# Patient Record
Sex: MALE | Race: WHITE | NOT HISPANIC OR LATINO | Employment: OTHER | URBAN - METROPOLITAN AREA
[De-identification: names, ages, dates, MRNs, and addresses within clinical notes are randomized per-mention and may not be internally consistent; named-entity substitution may affect disease eponyms.]

---

## 2017-01-02 DIAGNOSIS — G89.29 OTHER CHRONIC PAIN: ICD-10-CM

## 2017-01-02 DIAGNOSIS — G89.4 CHRONIC PAIN SYNDROME: Primary | ICD-10-CM

## 2017-01-02 RX ORDER — OXYCODONE HYDROCHLORIDE 10 MG/1
10 TABLET ORAL
Qty: 120 TABLET | Refills: 0 | Status: SHIPPED | OUTPATIENT
Start: 2017-01-02 | End: 2017-02-01 | Stop reason: SDUPTHER

## 2017-01-14 DIAGNOSIS — J01.90 SUBACUTE SINUSITIS, UNSPECIFIED LOCATION: Primary | ICD-10-CM

## 2017-01-14 RX ORDER — CEFDINIR 300 MG/1
300 CAPSULE ORAL 2 TIMES DAILY
Qty: 28 CAPSULE | Refills: 0 | Status: SHIPPED | OUTPATIENT
Start: 2017-01-14 | End: 2017-01-15

## 2017-01-15 DIAGNOSIS — J01.90 SUBACUTE SINUSITIS, UNSPECIFIED LOCATION: Primary | ICD-10-CM

## 2017-01-15 RX ORDER — AMOXICILLIN AND CLAVULANATE POTASSIUM 875; 125 MG/1; MG/1
1 TABLET, FILM COATED ORAL 2 TIMES DAILY
Qty: 28 TABLET | Refills: 0 | Status: SHIPPED | OUTPATIENT
Start: 2017-01-15 | End: 2018-01-16 | Stop reason: SDUPTHER

## 2017-02-01 DIAGNOSIS — G89.4 CHRONIC PAIN SYNDROME: ICD-10-CM

## 2017-02-01 DIAGNOSIS — G89.29 OTHER CHRONIC PAIN: ICD-10-CM

## 2017-02-01 RX ORDER — OXYCODONE HYDROCHLORIDE 10 MG/1
10 TABLET ORAL
Qty: 120 TABLET | Refills: 0 | Status: SHIPPED | OUTPATIENT
Start: 2017-02-01 | End: 2017-03-02 | Stop reason: SDUPTHER

## 2017-02-16 ENCOUNTER — LAB REQUISITION (OUTPATIENT)
Dept: LAB | Facility: HOSPITAL | Age: 67
End: 2017-02-16

## 2017-02-16 ENCOUNTER — OUTSIDE FACILITY SERVICE (OUTPATIENT)
Dept: GASTROENTEROLOGY | Facility: CLINIC | Age: 67
End: 2017-02-16

## 2017-02-16 DIAGNOSIS — K21.9 GASTRO-ESOPHAGEAL REFLUX DISEASE WITHOUT ESOPHAGITIS: ICD-10-CM

## 2017-02-16 PROCEDURE — 88305 TISSUE EXAM BY PATHOLOGIST: CPT | Performed by: INTERNAL MEDICINE

## 2017-02-16 PROCEDURE — 43239 EGD BIOPSY SINGLE/MULTIPLE: CPT | Performed by: INTERNAL MEDICINE

## 2017-02-17 LAB
CYTO UR: NORMAL
LAB AP CASE REPORT: NORMAL
LAB AP CLINICAL INFORMATION: NORMAL
Lab: NORMAL
PATH REPORT.FINAL DX SPEC: NORMAL
PATH REPORT.GROSS SPEC: NORMAL

## 2017-02-21 ENCOUNTER — TELEPHONE (OUTPATIENT)
Dept: GASTROENTEROLOGY | Facility: CLINIC | Age: 67
End: 2017-02-21

## 2017-02-21 NOTE — TELEPHONE ENCOUNTER
I called and discussed the results of the pathology. I recommend that he continue the current medication.

## 2017-02-22 DIAGNOSIS — E78.5 HYPERLIPIDEMIA, UNSPECIFIED HYPERLIPIDEMIA TYPE: ICD-10-CM

## 2017-02-22 RX ORDER — ATORVASTATIN CALCIUM 20 MG/1
20 TABLET, FILM COATED ORAL DAILY
Qty: 90 TABLET | Refills: 2 | Status: SHIPPED | OUTPATIENT
Start: 2017-02-22 | End: 2017-10-03 | Stop reason: SDUPTHER

## 2017-03-02 DIAGNOSIS — G89.4 CHRONIC PAIN SYNDROME: ICD-10-CM

## 2017-03-02 DIAGNOSIS — G89.29 OTHER CHRONIC PAIN: ICD-10-CM

## 2017-03-02 RX ORDER — OXYCODONE HYDROCHLORIDE 10 MG/1
10 TABLET ORAL
Qty: 120 TABLET | Refills: 0 | Status: SHIPPED | OUTPATIENT
Start: 2017-03-02 | End: 2017-04-03 | Stop reason: SDUPTHER

## 2017-04-03 DIAGNOSIS — G89.29 OTHER CHRONIC PAIN: ICD-10-CM

## 2017-04-03 DIAGNOSIS — G89.4 CHRONIC PAIN SYNDROME: ICD-10-CM

## 2017-04-04 RX ORDER — OXYCODONE HYDROCHLORIDE 10 MG/1
10 TABLET ORAL
Qty: 120 TABLET | Refills: 0
Start: 2017-04-04 | End: 2017-05-03 | Stop reason: SDUPTHER

## 2017-05-03 DIAGNOSIS — G89.4 CHRONIC PAIN SYNDROME: ICD-10-CM

## 2017-05-03 DIAGNOSIS — G89.29 OTHER CHRONIC PAIN: ICD-10-CM

## 2017-05-03 RX ORDER — OXYCODONE HYDROCHLORIDE 10 MG/1
10 TABLET ORAL
Qty: 120 TABLET | Refills: 0 | Status: SHIPPED | OUTPATIENT
Start: 2017-05-03 | End: 2017-05-03 | Stop reason: SDUPTHER

## 2017-05-03 RX ORDER — OXYCODONE HYDROCHLORIDE 10 MG/1
10 TABLET ORAL
Qty: 120 TABLET | Refills: 0 | Status: SHIPPED | OUTPATIENT
Start: 2017-05-03 | End: 2017-06-01 | Stop reason: SDUPTHER

## 2017-05-03 RX ORDER — OXYCODONE HYDROCHLORIDE 10 MG/1
10 TABLET ORAL
Qty: 120 TABLET | Refills: 0
Start: 2017-05-03 | End: 2017-05-03 | Stop reason: SDUPTHER

## 2017-05-19 RX ORDER — BUPROPION HYDROCHLORIDE 150 MG/1
TABLET ORAL
Qty: 30 TABLET | Refills: 0 | Status: SHIPPED | OUTPATIENT
Start: 2017-05-19 | End: 2017-06-22 | Stop reason: SDUPTHER

## 2017-06-01 DIAGNOSIS — G89.4 CHRONIC PAIN SYNDROME: ICD-10-CM

## 2017-06-01 DIAGNOSIS — G89.29 OTHER CHRONIC PAIN: ICD-10-CM

## 2017-06-01 RX ORDER — OXYCODONE HYDROCHLORIDE 10 MG/1
10 TABLET ORAL
Qty: 120 TABLET | Refills: 0 | Status: SHIPPED | OUTPATIENT
Start: 2017-06-01 | End: 2017-07-05 | Stop reason: SDUPTHER

## 2017-06-22 RX ORDER — BUPROPION HYDROCHLORIDE 150 MG/1
TABLET ORAL
Qty: 30 TABLET | Refills: 5 | Status: SHIPPED | OUTPATIENT
Start: 2017-06-22 | End: 2018-04-23

## 2017-07-05 DIAGNOSIS — G89.29 OTHER CHRONIC PAIN: ICD-10-CM

## 2017-07-05 DIAGNOSIS — G89.4 CHRONIC PAIN SYNDROME: ICD-10-CM

## 2017-07-05 RX ORDER — OXYCODONE HYDROCHLORIDE 10 MG/1
10 TABLET ORAL
Qty: 120 TABLET | Refills: 0 | Status: SHIPPED | OUTPATIENT
Start: 2017-07-05 | End: 2017-08-02 | Stop reason: SDUPTHER

## 2017-07-31 RX ORDER — SERTRALINE HYDROCHLORIDE 100 MG/1
TABLET, FILM COATED ORAL
Qty: 30 TABLET | Refills: 5 | Status: SHIPPED | OUTPATIENT
Start: 2017-07-31 | End: 2018-02-20 | Stop reason: SDUPTHER

## 2017-08-02 DIAGNOSIS — G89.4 CHRONIC PAIN SYNDROME: ICD-10-CM

## 2017-08-02 DIAGNOSIS — G89.29 OTHER CHRONIC PAIN: ICD-10-CM

## 2017-08-02 RX ORDER — OXYCODONE HYDROCHLORIDE 10 MG/1
10 TABLET ORAL
Qty: 120 TABLET | Refills: 0 | Status: SHIPPED | OUTPATIENT
Start: 2017-08-02 | End: 2017-09-02 | Stop reason: SDUPTHER

## 2017-08-17 DIAGNOSIS — E11.9 DIABETIC EYE EXAM (HCC): Primary | ICD-10-CM

## 2017-08-17 DIAGNOSIS — Z01.00 DIABETIC EYE EXAM (HCC): Primary | ICD-10-CM

## 2017-09-02 DIAGNOSIS — G89.29 CHRONIC NECK AND BACK PAIN: Primary | ICD-10-CM

## 2017-09-02 DIAGNOSIS — G89.4 CHRONIC PAIN SYNDROME: ICD-10-CM

## 2017-09-02 DIAGNOSIS — M54.9 CHRONIC NECK AND BACK PAIN: Primary | ICD-10-CM

## 2017-09-02 DIAGNOSIS — G89.29 OTHER CHRONIC PAIN: ICD-10-CM

## 2017-09-02 DIAGNOSIS — M54.2 CHRONIC NECK AND BACK PAIN: Primary | ICD-10-CM

## 2017-09-02 RX ORDER — OXYCODONE HYDROCHLORIDE 10 MG/1
10 TABLET ORAL
Qty: 120 TABLET | Refills: 0
Start: 2017-09-02 | End: 2017-10-03 | Stop reason: SDUPTHER

## 2017-10-03 DIAGNOSIS — E78.49 OTHER HYPERLIPIDEMIA: ICD-10-CM

## 2017-10-03 DIAGNOSIS — M54.9 CHRONIC NECK AND BACK PAIN: Primary | ICD-10-CM

## 2017-10-03 DIAGNOSIS — G89.4 CHRONIC PAIN SYNDROME: ICD-10-CM

## 2017-10-03 DIAGNOSIS — G89.29 CHRONIC NECK AND BACK PAIN: Primary | ICD-10-CM

## 2017-10-03 DIAGNOSIS — G89.29 OTHER CHRONIC PAIN: ICD-10-CM

## 2017-10-03 DIAGNOSIS — E78.5 HYPERLIPIDEMIA, UNSPECIFIED HYPERLIPIDEMIA TYPE: ICD-10-CM

## 2017-10-03 DIAGNOSIS — M54.2 CHRONIC NECK AND BACK PAIN: Primary | ICD-10-CM

## 2017-10-03 RX ORDER — OXYCODONE HYDROCHLORIDE 10 MG/1
10 TABLET ORAL
Qty: 120 TABLET | Refills: 0 | Status: SHIPPED | OUTPATIENT
Start: 2017-10-03 | End: 2017-11-02 | Stop reason: SDUPTHER

## 2017-10-03 RX ORDER — ATORVASTATIN CALCIUM 20 MG/1
20 TABLET, FILM COATED ORAL DAILY
Qty: 90 TABLET | Refills: 2 | Status: SHIPPED | OUTPATIENT
Start: 2017-10-03 | End: 2018-06-07 | Stop reason: SDUPTHER

## 2017-11-02 DIAGNOSIS — G89.4 CHRONIC PAIN SYNDROME: ICD-10-CM

## 2017-11-02 DIAGNOSIS — G89.29 OTHER CHRONIC PAIN: ICD-10-CM

## 2017-11-02 RX ORDER — OXYCODONE HYDROCHLORIDE 10 MG/1
10 TABLET ORAL
Qty: 120 TABLET | Refills: 0 | Status: SHIPPED | OUTPATIENT
Start: 2017-11-02 | End: 2017-12-04 | Stop reason: SDUPTHER

## 2017-12-04 DIAGNOSIS — G89.4 CHRONIC PAIN SYNDROME: ICD-10-CM

## 2017-12-04 DIAGNOSIS — G89.29 OTHER CHRONIC PAIN: ICD-10-CM

## 2017-12-04 RX ORDER — OXYCODONE HYDROCHLORIDE 10 MG/1
10 TABLET ORAL
Qty: 120 TABLET | Refills: 0 | Status: SHIPPED | OUTPATIENT
Start: 2017-12-04 | End: 2018-01-03 | Stop reason: SDUPTHER

## 2018-01-03 DIAGNOSIS — G89.4 CHRONIC PAIN SYNDROME: ICD-10-CM

## 2018-01-03 DIAGNOSIS — G89.29 OTHER CHRONIC PAIN: ICD-10-CM

## 2018-01-03 RX ORDER — OXYCODONE HYDROCHLORIDE 10 MG/1
10 TABLET ORAL
Qty: 120 TABLET | Refills: 0 | Status: SHIPPED | OUTPATIENT
Start: 2018-01-03 | End: 2018-02-02 | Stop reason: SDUPTHER

## 2018-01-04 ENCOUNTER — TELEPHONE (OUTPATIENT)
Dept: INTERNAL MEDICINE | Facility: CLINIC | Age: 68
End: 2018-01-04

## 2018-01-04 DIAGNOSIS — K21.9 GASTROESOPHAGEAL REFLUX DISEASE WITHOUT ESOPHAGITIS: ICD-10-CM

## 2018-01-04 RX ORDER — OMEPRAZOLE 20 MG/1
20 CAPSULE, DELAYED RELEASE ORAL 2 TIMES DAILY
Qty: 180 CAPSULE | Refills: 3 | Status: ON HOLD | OUTPATIENT
Start: 2018-01-04 | End: 2019-01-01

## 2018-01-04 RX ORDER — OMEPRAZOLE 20 MG/1
20 CAPSULE, DELAYED RELEASE ORAL DAILY
Qty: 180 CAPSULE | Refills: 3 | Status: SHIPPED | OUTPATIENT
Start: 2018-01-04 | End: 2018-01-04 | Stop reason: SDUPTHER

## 2018-01-04 NOTE — TELEPHONE ENCOUNTER
MEIJER HAS QUESTIONS ON THE DIRECTIONS OF PATIENT'S OMEPRAZOLE. LAST RX STATED 1 BID. [phone]

## 2018-01-16 DIAGNOSIS — J01.90 SUBACUTE SINUSITIS, UNSPECIFIED LOCATION: ICD-10-CM

## 2018-01-16 RX ORDER — AMOXICILLIN AND CLAVULANATE POTASSIUM 875; 125 MG/1; MG/1
1 TABLET, FILM COATED ORAL
Qty: 28 TABLET | Refills: 0 | Status: SHIPPED | OUTPATIENT
Start: 2018-01-16 | End: 2018-12-10

## 2018-02-02 DIAGNOSIS — G89.4 CHRONIC PAIN SYNDROME: ICD-10-CM

## 2018-02-02 DIAGNOSIS — G89.29 OTHER CHRONIC PAIN: ICD-10-CM

## 2018-02-02 RX ORDER — OXYCODONE HYDROCHLORIDE 10 MG/1
10 TABLET ORAL
Qty: 120 TABLET | Refills: 0 | Status: SHIPPED | OUTPATIENT
Start: 2018-02-02 | End: 2018-03-06 | Stop reason: SDUPTHER

## 2018-02-20 RX ORDER — SERTRALINE HYDROCHLORIDE 100 MG/1
TABLET, FILM COATED ORAL
Qty: 30 TABLET | Refills: 4 | Status: SHIPPED | OUTPATIENT
Start: 2018-02-20 | End: 2018-07-15

## 2018-03-06 DIAGNOSIS — G89.29 OTHER CHRONIC PAIN: ICD-10-CM

## 2018-03-06 DIAGNOSIS — G89.4 CHRONIC PAIN SYNDROME: ICD-10-CM

## 2018-03-06 RX ORDER — OXYCODONE HYDROCHLORIDE 10 MG/1
10 TABLET ORAL
Qty: 120 TABLET | Refills: 0 | Status: SHIPPED | OUTPATIENT
Start: 2018-03-06 | End: 2018-04-03 | Stop reason: SDUPTHER

## 2018-04-03 DIAGNOSIS — G89.4 CHRONIC PAIN SYNDROME: ICD-10-CM

## 2018-04-03 DIAGNOSIS — G89.29 OTHER CHRONIC PAIN: ICD-10-CM

## 2018-04-03 RX ORDER — OXYCODONE HYDROCHLORIDE 10 MG/1
10 TABLET ORAL
Qty: 120 TABLET | Refills: 0 | Status: SHIPPED | OUTPATIENT
Start: 2018-04-03 | End: 2018-05-05 | Stop reason: SDUPTHER

## 2018-04-17 DIAGNOSIS — IMO0002 UNCONTROLLED TYPE 2 DIABETES MELLITUS WITH COMPLICATION, WITHOUT LONG-TERM CURRENT USE OF INSULIN: ICD-10-CM

## 2018-04-17 RX ORDER — METFORMIN HYDROCHLORIDE 500 MG/1
1000 TABLET, EXTENDED RELEASE ORAL 2 TIMES DAILY
Qty: 360 TABLET | Refills: 0 | Status: SHIPPED | OUTPATIENT
Start: 2018-04-17 | End: 2019-01-29 | Stop reason: SDUPTHER

## 2018-04-23 RX ORDER — BUPROPION HYDROCHLORIDE 75 MG/1
75 TABLET ORAL 2 TIMES DAILY
Qty: 180 TABLET | Refills: 3 | Status: SHIPPED | OUTPATIENT
Start: 2018-04-23 | End: 2018-04-23 | Stop reason: SDUPTHER

## 2018-04-23 RX ORDER — BUPROPION HYDROCHLORIDE 75 MG/1
75 TABLET ORAL 2 TIMES DAILY
Qty: 180 TABLET | Refills: 3 | Status: SHIPPED | OUTPATIENT
Start: 2018-04-23 | End: 2018-07-15

## 2018-05-05 DIAGNOSIS — G89.29 OTHER CHRONIC PAIN: ICD-10-CM

## 2018-05-05 DIAGNOSIS — G89.4 CHRONIC PAIN SYNDROME: ICD-10-CM

## 2018-05-05 RX ORDER — OXYCODONE HYDROCHLORIDE 10 MG/1
10 TABLET ORAL
Qty: 120 TABLET | Refills: 0 | Status: SHIPPED | OUTPATIENT
Start: 2018-05-05 | End: 2018-06-02 | Stop reason: SDUPTHER

## 2018-06-02 DIAGNOSIS — G89.29 OTHER CHRONIC PAIN: ICD-10-CM

## 2018-06-02 DIAGNOSIS — G89.4 CHRONIC PAIN SYNDROME: ICD-10-CM

## 2018-06-02 RX ORDER — OXYCODONE HYDROCHLORIDE 10 MG/1
10 TABLET ORAL
Qty: 120 TABLET | Refills: 0 | Status: SHIPPED | OUTPATIENT
Start: 2018-06-02 | End: 2018-06-02 | Stop reason: SDUPTHER

## 2018-06-02 RX ORDER — OXYCODONE HYDROCHLORIDE 10 MG/1
10 TABLET ORAL
Qty: 120 TABLET | Refills: 0 | Status: SHIPPED | OUTPATIENT
Start: 2018-06-02 | End: 2018-07-02 | Stop reason: SDUPTHER

## 2018-06-07 DIAGNOSIS — E78.5 HYPERLIPIDEMIA, UNSPECIFIED HYPERLIPIDEMIA TYPE: ICD-10-CM

## 2018-06-07 RX ORDER — ATORVASTATIN CALCIUM 20 MG/1
20 TABLET, FILM COATED ORAL DAILY
Qty: 90 TABLET | Refills: 2 | Status: SHIPPED | OUTPATIENT
Start: 2018-06-07 | End: 2019-01-01

## 2018-07-02 DIAGNOSIS — G89.4 CHRONIC PAIN SYNDROME: ICD-10-CM

## 2018-07-02 DIAGNOSIS — G89.29 OTHER CHRONIC PAIN: ICD-10-CM

## 2018-07-02 RX ORDER — OXYCODONE HYDROCHLORIDE 10 MG/1
10 TABLET ORAL
Qty: 120 TABLET | Refills: 0 | Status: SHIPPED | OUTPATIENT
Start: 2018-07-02 | End: 2018-08-01 | Stop reason: SDUPTHER

## 2018-07-15 RX ORDER — DULOXETIN HYDROCHLORIDE 60 MG/1
60 CAPSULE, DELAYED RELEASE ORAL DAILY
Qty: 90 CAPSULE | Refills: 3 | Status: SHIPPED | OUTPATIENT
Start: 2018-07-15 | End: 2019-03-01 | Stop reason: SDUPTHER

## 2018-08-01 DIAGNOSIS — G89.29 OTHER CHRONIC PAIN: ICD-10-CM

## 2018-08-01 DIAGNOSIS — G89.4 CHRONIC PAIN SYNDROME: ICD-10-CM

## 2018-08-01 RX ORDER — OXYCODONE HYDROCHLORIDE 10 MG/1
10 TABLET ORAL
Qty: 120 TABLET | Refills: 0 | Status: SHIPPED | OUTPATIENT
Start: 2018-08-01 | End: 2018-09-03 | Stop reason: SDUPTHER

## 2018-08-20 DIAGNOSIS — IMO0002 UNCONTROLLED TYPE 2 DIABETES MELLITUS WITH OTHER SPECIFIED COMPLICATION, WITHOUT LONG-TERM CURRENT USE OF INSULIN: ICD-10-CM

## 2018-08-20 DIAGNOSIS — E78.49 OTHER HYPERLIPIDEMIA: Primary | ICD-10-CM

## 2018-08-20 DIAGNOSIS — I25.10 CHRONIC CORONARY ARTERY DISEASE: ICD-10-CM

## 2018-08-20 DIAGNOSIS — Z00.00 ROUTINE GENERAL MEDICAL EXAMINATION AT A HEALTH CARE FACILITY: ICD-10-CM

## 2018-08-20 DIAGNOSIS — Z12.5 SCREENING FOR PROSTATE CANCER: ICD-10-CM

## 2018-08-21 ENCOUNTER — APPOINTMENT (OUTPATIENT)
Dept: LAB | Facility: HOSPITAL | Age: 68
End: 2018-08-21

## 2018-08-21 DIAGNOSIS — IMO0002 UNCONTROLLED TYPE 2 DIABETES MELLITUS WITH OTHER SPECIFIED COMPLICATION, WITHOUT LONG-TERM CURRENT USE OF INSULIN: Primary | ICD-10-CM

## 2018-08-21 LAB
ALBUMIN SERPL-MCNC: 4.3 G/DL (ref 3.2–4.8)
ALBUMIN/GLOB SERPL: 1.5 G/DL (ref 1.5–2.5)
ALP SERPL-CCNC: 113 U/L (ref 25–100)
ALT SERPL W P-5'-P-CCNC: 23 U/L (ref 7–40)
ANION GAP SERPL CALCULATED.3IONS-SCNC: 9 MMOL/L (ref 3–11)
ARTICHOKE IGE QN: 132 MG/DL (ref 0–130)
AST SERPL-CCNC: 17 U/L (ref 0–33)
BILIRUB SERPL-MCNC: 0.4 MG/DL (ref 0.3–1.2)
BUN BLD-MCNC: 24 MG/DL (ref 9–23)
BUN/CREAT SERPL: 20.2 (ref 7–25)
CALCIUM SPEC-SCNC: 9.5 MG/DL (ref 8.7–10.4)
CHLORIDE SERPL-SCNC: 106 MMOL/L (ref 99–109)
CHOLEST SERPL-MCNC: 206 MG/DL (ref 0–200)
CO2 SERPL-SCNC: 26 MMOL/L (ref 20–31)
CREAT BLD-MCNC: 1.19 MG/DL (ref 0.6–1.3)
DEPRECATED RDW RBC AUTO: 43.9 FL (ref 37–54)
ERYTHROCYTE [DISTWIDTH] IN BLOOD BY AUTOMATED COUNT: 12.8 % (ref 11.3–14.5)
GFR SERPL CREATININE-BSD FRML MDRD: 61 ML/MIN/1.73
GLOBULIN UR ELPH-MCNC: 2.9 GM/DL
GLUCOSE BLD-MCNC: 344 MG/DL (ref 70–100)
HBA1C MFR BLD: 12.9 % (ref 4.8–5.6)
HCT VFR BLD AUTO: 43.8 % (ref 38.9–50.9)
HDLC SERPL-MCNC: 36 MG/DL (ref 40–60)
HGB BLD-MCNC: 14.7 G/DL (ref 13.1–17.5)
MCH RBC QN AUTO: 31.2 PG (ref 27–31)
MCHC RBC AUTO-ENTMCNC: 33.6 G/DL (ref 32–36)
MCV RBC AUTO: 93 FL (ref 80–99)
PLATELET # BLD AUTO: 247 10*3/MM3 (ref 150–450)
PMV BLD AUTO: 12.8 FL (ref 6–12)
POTASSIUM BLD-SCNC: 4.4 MMOL/L (ref 3.5–5.5)
PROT SERPL-MCNC: 7.2 G/DL (ref 5.7–8.2)
PSA SERPL-MCNC: 0.68 NG/ML (ref 0–4)
RBC # BLD AUTO: 4.71 10*6/MM3 (ref 4.2–5.76)
SODIUM BLD-SCNC: 141 MMOL/L (ref 132–146)
TESTOST SERPL-MCNC: 284.43 NG/DL (ref 86.98–780.1)
TRIGL SERPL-MCNC: 331 MG/DL (ref 0–150)
TSH SERPL DL<=0.05 MIU/L-ACNC: 2.54 MIU/ML (ref 0.35–5.35)
VIT B12 BLD-MCNC: 685 PG/ML (ref 211–911)
WBC NRBC COR # BLD: 8.8 10*3/MM3 (ref 3.5–10.8)

## 2018-08-21 PROCEDURE — 85027 COMPLETE CBC AUTOMATED: CPT | Performed by: INTERNAL MEDICINE

## 2018-08-21 PROCEDURE — 83036 HEMOGLOBIN GLYCOSYLATED A1C: CPT | Performed by: INTERNAL MEDICINE

## 2018-08-21 PROCEDURE — G0103 PSA SCREENING: HCPCS | Performed by: INTERNAL MEDICINE

## 2018-08-21 PROCEDURE — 80061 LIPID PANEL: CPT | Performed by: INTERNAL MEDICINE

## 2018-08-21 PROCEDURE — 36415 COLL VENOUS BLD VENIPUNCTURE: CPT | Performed by: INTERNAL MEDICINE

## 2018-08-21 PROCEDURE — 84403 ASSAY OF TOTAL TESTOSTERONE: CPT | Performed by: INTERNAL MEDICINE

## 2018-08-21 PROCEDURE — 80053 COMPREHEN METABOLIC PANEL: CPT | Performed by: INTERNAL MEDICINE

## 2018-08-21 PROCEDURE — 84443 ASSAY THYROID STIM HORMONE: CPT | Performed by: INTERNAL MEDICINE

## 2018-08-21 PROCEDURE — 82607 VITAMIN B-12: CPT | Performed by: INTERNAL MEDICINE

## 2018-08-21 RX ORDER — GLIMEPIRIDE 2 MG/1
2 TABLET ORAL
Qty: 90 TABLET | Refills: 3 | Status: ON HOLD | OUTPATIENT
Start: 2018-08-21 | End: 2020-01-01

## 2018-09-03 DIAGNOSIS — G89.29 OTHER CHRONIC PAIN: ICD-10-CM

## 2018-09-03 DIAGNOSIS — G89.4 CHRONIC PAIN SYNDROME: ICD-10-CM

## 2018-09-03 RX ORDER — OXYCODONE HYDROCHLORIDE 10 MG/1
10 TABLET ORAL
Qty: 120 TABLET | Refills: 0 | Status: SHIPPED | OUTPATIENT
Start: 2018-09-03 | End: 2018-10-03 | Stop reason: SDUPTHER

## 2018-10-03 DIAGNOSIS — G89.29 OTHER CHRONIC PAIN: ICD-10-CM

## 2018-10-03 DIAGNOSIS — G89.4 CHRONIC PAIN SYNDROME: ICD-10-CM

## 2018-10-03 RX ORDER — OXYCODONE HYDROCHLORIDE 10 MG/1
10 TABLET ORAL
Qty: 120 TABLET | Refills: 0 | Status: SHIPPED | OUTPATIENT
Start: 2018-10-03 | End: 2018-11-02 | Stop reason: SDUPTHER

## 2018-11-02 DIAGNOSIS — G89.4 CHRONIC PAIN SYNDROME: ICD-10-CM

## 2018-11-02 DIAGNOSIS — G89.29 OTHER CHRONIC PAIN: ICD-10-CM

## 2018-11-02 RX ORDER — OXYCODONE HYDROCHLORIDE 10 MG/1
10 TABLET ORAL
Qty: 120 TABLET | Refills: 0 | Status: SHIPPED | OUTPATIENT
Start: 2018-11-02 | End: 2018-12-01 | Stop reason: SDUPTHER

## 2018-12-01 DIAGNOSIS — G89.4 CHRONIC PAIN SYNDROME: ICD-10-CM

## 2018-12-01 DIAGNOSIS — G89.29 OTHER CHRONIC PAIN: ICD-10-CM

## 2018-12-01 RX ORDER — OXYCODONE HYDROCHLORIDE 10 MG/1
10 TABLET ORAL
Qty: 120 TABLET | Refills: 0 | Status: SHIPPED | OUTPATIENT
Start: 2018-12-01 | End: 2019-01-03 | Stop reason: SDUPTHER

## 2018-12-10 RX ORDER — DOXYCYCLINE HYCLATE 100 MG/1
100 CAPSULE ORAL 2 TIMES DAILY
Qty: 20 CAPSULE | Refills: 0 | Status: SHIPPED | OUTPATIENT
Start: 2018-12-10 | End: 2018-12-24 | Stop reason: SDUPTHER

## 2018-12-14 RX ORDER — SILDENAFIL 100 MG/1
100 TABLET, FILM COATED ORAL DAILY PRN
Qty: 30 TABLET | Refills: 5 | Status: SHIPPED | OUTPATIENT
Start: 2018-12-14 | End: 2019-01-01

## 2018-12-24 RX ORDER — DOXYCYCLINE HYCLATE 100 MG/1
100 CAPSULE ORAL 2 TIMES DAILY
Qty: 20 CAPSULE | Refills: 0 | Status: SHIPPED | OUTPATIENT
Start: 2018-12-24 | End: 2019-03-22

## 2019-01-01 ENCOUNTER — APPOINTMENT (OUTPATIENT)
Dept: NUCLEAR MEDICINE | Facility: HOSPITAL | Age: 69
End: 2019-01-01

## 2019-01-01 ENCOUNTER — APPOINTMENT (OUTPATIENT)
Dept: CARDIOLOGY | Facility: HOSPITAL | Age: 69
End: 2019-01-01

## 2019-01-01 ENCOUNTER — READMISSION MANAGEMENT (OUTPATIENT)
Dept: CALL CENTER | Facility: HOSPITAL | Age: 69
End: 2019-01-01

## 2019-01-01 ENCOUNTER — ANESTHESIA EVENT (OUTPATIENT)
Dept: GASTROENTEROLOGY | Facility: HOSPITAL | Age: 69
End: 2019-01-01

## 2019-01-01 ENCOUNTER — APPOINTMENT (OUTPATIENT)
Dept: GENERAL RADIOLOGY | Facility: HOSPITAL | Age: 69
End: 2019-01-01

## 2019-01-01 ENCOUNTER — OFFICE VISIT (OUTPATIENT)
Dept: ONCOLOGY | Facility: CLINIC | Age: 69
End: 2019-01-01

## 2019-01-01 ENCOUNTER — APPOINTMENT (OUTPATIENT)
Dept: CT IMAGING | Facility: HOSPITAL | Age: 69
End: 2019-01-01

## 2019-01-01 ENCOUNTER — TRANSITIONAL CARE MANAGEMENT TELEPHONE ENCOUNTER (OUTPATIENT)
Dept: INTERNAL MEDICINE | Facility: CLINIC | Age: 69
End: 2019-01-01

## 2019-01-01 ENCOUNTER — DOCUMENTATION (OUTPATIENT)
Dept: NUTRITION | Facility: HOSPITAL | Age: 69
End: 2019-01-01

## 2019-01-01 ENCOUNTER — APPOINTMENT (OUTPATIENT)
Dept: LAB | Facility: HOSPITAL | Age: 69
End: 2019-01-01

## 2019-01-01 ENCOUNTER — ANESTHESIA (OUTPATIENT)
Dept: GASTROENTEROLOGY | Facility: HOSPITAL | Age: 69
End: 2019-01-01

## 2019-01-01 ENCOUNTER — OFFICE VISIT (OUTPATIENT)
Dept: INTERNAL MEDICINE | Facility: CLINIC | Age: 69
End: 2019-01-01

## 2019-01-01 ENCOUNTER — HOSPITAL ENCOUNTER (OUTPATIENT)
Dept: ONCOLOGY | Facility: HOSPITAL | Age: 69
Setting detail: INFUSION SERIES
Discharge: HOME | End: 2019-10-29

## 2019-01-01 ENCOUNTER — HOSPITAL ENCOUNTER (OUTPATIENT)
Dept: CT IMAGING | Facility: HOSPITAL | Age: 69
Discharge: HOME | End: 2019-09-27
Admitting: INTERNAL MEDICINE

## 2019-01-01 ENCOUNTER — OFFICE VISIT (OUTPATIENT)
Dept: PULMONOLOGY | Facility: CLINIC | Age: 69
End: 2019-01-01

## 2019-01-01 ENCOUNTER — HOSPITAL ENCOUNTER (OUTPATIENT)
Dept: ONCOLOGY | Facility: HOSPITAL | Age: 69
Setting detail: INFUSION SERIES
Discharge: HOME | End: 2019-12-11

## 2019-01-01 ENCOUNTER — HOSPITAL ENCOUNTER (OUTPATIENT)
Dept: ONCOLOGY | Facility: HOSPITAL | Age: 69
Setting detail: INFUSION SERIES
Discharge: HOME | End: 2019-12-10

## 2019-01-01 ENCOUNTER — HOSPITAL ENCOUNTER (INPATIENT)
Facility: HOSPITAL | Age: 69
LOS: 9 days | Discharge: HOME HEALTH | End: 2019-10-12
Attending: EMERGENCY MEDICINE | Admitting: THORACIC SURGERY (CARDIOTHORACIC VASCULAR SURGERY)

## 2019-01-01 ENCOUNTER — HOSPITAL ENCOUNTER (INPATIENT)
Facility: HOSPITAL | Age: 69
LOS: 1 days | Discharge: HOME | End: 2019-11-16
Attending: EMERGENCY MEDICINE | Admitting: INTERNAL MEDICINE

## 2019-01-01 ENCOUNTER — HOSPITAL ENCOUNTER (OUTPATIENT)
Dept: ONCOLOGY | Facility: HOSPITAL | Age: 69
Setting detail: INFUSION SERIES
Discharge: HOME | End: 2019-12-31

## 2019-01-01 ENCOUNTER — HOSPITAL ENCOUNTER (OUTPATIENT)
Dept: ONCOLOGY | Facility: HOSPITAL | Age: 69
Setting detail: INFUSION SERIES
Discharge: HOME | End: 2019-11-19

## 2019-01-01 ENCOUNTER — ANESTHESIA (OUTPATIENT)
Dept: PERIOP | Facility: HOSPITAL | Age: 69
End: 2019-01-01

## 2019-01-01 ENCOUNTER — HOSPITAL ENCOUNTER (OUTPATIENT)
Dept: CT IMAGING | Facility: HOSPITAL | Age: 69
Discharge: HOME | End: 2019-12-07
Admitting: INTERNAL MEDICINE

## 2019-01-01 ENCOUNTER — HOSPITAL ENCOUNTER (OUTPATIENT)
Dept: ONCOLOGY | Facility: HOSPITAL | Age: 69
Setting detail: INFUSION SERIES
Discharge: HOME | End: 2019-11-18

## 2019-01-01 ENCOUNTER — HOSPITAL ENCOUNTER (OUTPATIENT)
Dept: ONCOLOGY | Facility: HOSPITAL | Age: 69
Setting detail: INFUSION SERIES
Discharge: HOME | End: 2019-11-20

## 2019-01-01 ENCOUNTER — HOSPITAL ENCOUNTER (OUTPATIENT)
Dept: ONCOLOGY | Facility: HOSPITAL | Age: 69
Setting detail: INFUSION SERIES
Discharge: HOME | End: 2019-10-28

## 2019-01-01 ENCOUNTER — APPOINTMENT (OUTPATIENT)
Dept: MRI IMAGING | Facility: HOSPITAL | Age: 69
End: 2019-01-01

## 2019-01-01 ENCOUNTER — OFFICE VISIT (OUTPATIENT)
Dept: CARDIAC SURGERY | Facility: CLINIC | Age: 69
End: 2019-01-01

## 2019-01-01 ENCOUNTER — ANESTHESIA EVENT (OUTPATIENT)
Dept: PERIOP | Facility: HOSPITAL | Age: 69
End: 2019-01-01

## 2019-01-01 ENCOUNTER — HOSPITAL ENCOUNTER (OUTPATIENT)
Dept: GENERAL RADIOLOGY | Facility: HOSPITAL | Age: 69
Discharge: HOME | End: 2019-08-21
Admitting: INTERNAL MEDICINE

## 2019-01-01 ENCOUNTER — HOSPITAL ENCOUNTER (OUTPATIENT)
Dept: ONCOLOGY | Facility: HOSPITAL | Age: 69
Setting detail: INFUSION SERIES
Discharge: HOME | End: 2019-10-13

## 2019-01-01 ENCOUNTER — HOSPITAL ENCOUNTER (OUTPATIENT)
Dept: GENERAL RADIOLOGY | Facility: HOSPITAL | Age: 69
Discharge: HOME | End: 2019-09-26
Admitting: INTERNAL MEDICINE

## 2019-01-01 ENCOUNTER — HOSPITAL ENCOUNTER (OUTPATIENT)
Dept: ONCOLOGY | Facility: HOSPITAL | Age: 69
Setting detail: INFUSION SERIES
Discharge: HOME | End: 2019-12-09

## 2019-01-01 ENCOUNTER — HOSPITAL ENCOUNTER (OUTPATIENT)
Dept: ONCOLOGY | Facility: HOSPITAL | Age: 69
Setting detail: INFUSION SERIES
Discharge: HOME | End: 2019-10-30

## 2019-01-01 VITALS
HEART RATE: 105 BPM | BODY MASS INDEX: 21 KG/M2 | DIASTOLIC BLOOD PRESSURE: 82 MMHG | OXYGEN SATURATION: 98 % | HEIGHT: 71 IN | TEMPERATURE: 98.7 F | SYSTOLIC BLOOD PRESSURE: 124 MMHG | WEIGHT: 150 LBS

## 2019-01-01 VITALS
BODY MASS INDEX: 21.9 KG/M2 | HEIGHT: 70 IN | WEIGHT: 153 LBS | WEIGHT: 157 LBS | TEMPERATURE: 97.1 F | DIASTOLIC BLOOD PRESSURE: 64 MMHG | HEART RATE: 113 BPM | HEART RATE: 103 BPM | RESPIRATION RATE: 20 BRPM | SYSTOLIC BLOOD PRESSURE: 121 MMHG | TEMPERATURE: 98 F | BODY MASS INDEX: 22.48 KG/M2 | DIASTOLIC BLOOD PRESSURE: 72 MMHG | OXYGEN SATURATION: 93 % | RESPIRATION RATE: 16 BRPM | SYSTOLIC BLOOD PRESSURE: 114 MMHG | HEIGHT: 70 IN

## 2019-01-01 VITALS
WEIGHT: 158 LBS | SYSTOLIC BLOOD PRESSURE: 119 MMHG | OXYGEN SATURATION: 97 % | HEIGHT: 70 IN | DIASTOLIC BLOOD PRESSURE: 70 MMHG | RESPIRATION RATE: 16 BRPM | TEMPERATURE: 97.9 F | HEART RATE: 83 BPM | BODY MASS INDEX: 22.62 KG/M2

## 2019-01-01 VITALS
RESPIRATION RATE: 20 BRPM | WEIGHT: 156 LBS | HEART RATE: 99 BPM | SYSTOLIC BLOOD PRESSURE: 131 MMHG | HEIGHT: 70 IN | BODY MASS INDEX: 22.33 KG/M2 | TEMPERATURE: 97.9 F | DIASTOLIC BLOOD PRESSURE: 84 MMHG | OXYGEN SATURATION: 98 %

## 2019-01-01 VITALS
SYSTOLIC BLOOD PRESSURE: 139 MMHG | HEART RATE: 92 BPM | HEIGHT: 71 IN | RESPIRATION RATE: 16 BRPM | OXYGEN SATURATION: 92 % | TEMPERATURE: 98 F | BODY MASS INDEX: 22.26 KG/M2 | WEIGHT: 159 LBS | DIASTOLIC BLOOD PRESSURE: 74 MMHG

## 2019-01-01 VITALS
HEART RATE: 122 BPM | HEIGHT: 70 IN | RESPIRATION RATE: 18 BRPM | WEIGHT: 155 LBS | TEMPERATURE: 97.2 F | BODY MASS INDEX: 22.19 KG/M2 | DIASTOLIC BLOOD PRESSURE: 63 MMHG | SYSTOLIC BLOOD PRESSURE: 115 MMHG

## 2019-01-01 VITALS
SYSTOLIC BLOOD PRESSURE: 108 MMHG | DIASTOLIC BLOOD PRESSURE: 64 MMHG | HEART RATE: 115 BPM | BODY MASS INDEX: 22.33 KG/M2 | HEIGHT: 70 IN | WEIGHT: 156 LBS | TEMPERATURE: 97.4 F | RESPIRATION RATE: 18 BRPM

## 2019-01-01 VITALS
SYSTOLIC BLOOD PRESSURE: 97 MMHG | HEIGHT: 71 IN | RESPIRATION RATE: 16 BRPM | BODY MASS INDEX: 21.98 KG/M2 | WEIGHT: 157 LBS | DIASTOLIC BLOOD PRESSURE: 59 MMHG | TEMPERATURE: 97.8 F | HEART RATE: 122 BPM

## 2019-01-01 VITALS
DIASTOLIC BLOOD PRESSURE: 80 MMHG | HEART RATE: 80 BPM | BODY MASS INDEX: 23.09 KG/M2 | RESPIRATION RATE: 16 BRPM | HEIGHT: 71 IN | TEMPERATURE: 97 F | SYSTOLIC BLOOD PRESSURE: 136 MMHG

## 2019-01-01 VITALS
SYSTOLIC BLOOD PRESSURE: 104 MMHG | RESPIRATION RATE: 20 BRPM | HEIGHT: 70 IN | DIASTOLIC BLOOD PRESSURE: 56 MMHG | TEMPERATURE: 98.8 F | HEART RATE: 113 BPM | WEIGHT: 157 LBS | BODY MASS INDEX: 22.48 KG/M2

## 2019-01-01 VITALS
HEART RATE: 118 BPM | HEIGHT: 71 IN | SYSTOLIC BLOOD PRESSURE: 124 MMHG | TEMPERATURE: 97.9 F | WEIGHT: 153 LBS | DIASTOLIC BLOOD PRESSURE: 67 MMHG | OXYGEN SATURATION: 97 % | BODY MASS INDEX: 21.42 KG/M2

## 2019-01-01 VITALS
SYSTOLIC BLOOD PRESSURE: 124 MMHG | HEIGHT: 71 IN | BODY MASS INDEX: 23.8 KG/M2 | WEIGHT: 170 LBS | DIASTOLIC BLOOD PRESSURE: 68 MMHG | HEART RATE: 68 BPM

## 2019-01-01 VITALS
OXYGEN SATURATION: 97 % | HEART RATE: 118 BPM | BODY MASS INDEX: 21.9 KG/M2 | RESPIRATION RATE: 18 BRPM | HEIGHT: 70 IN | DIASTOLIC BLOOD PRESSURE: 67 MMHG | WEIGHT: 153 LBS | TEMPERATURE: 97.9 F | SYSTOLIC BLOOD PRESSURE: 124 MMHG

## 2019-01-01 VITALS
RESPIRATION RATE: 16 BRPM | WEIGHT: 160 LBS | HEIGHT: 71 IN | BODY MASS INDEX: 22.4 KG/M2 | SYSTOLIC BLOOD PRESSURE: 120 MMHG | HEART RATE: 109 BPM | DIASTOLIC BLOOD PRESSURE: 72 MMHG | TEMPERATURE: 97.5 F

## 2019-01-01 VITALS
HEART RATE: 86 BPM | SYSTOLIC BLOOD PRESSURE: 135 MMHG | OXYGEN SATURATION: 92 % | RESPIRATION RATE: 16 BRPM | HEIGHT: 71 IN | BODY MASS INDEX: 23.17 KG/M2 | WEIGHT: 165.5 LBS | DIASTOLIC BLOOD PRESSURE: 69 MMHG | TEMPERATURE: 98.3 F

## 2019-01-01 DIAGNOSIS — C34.11 SMALL CELL LUNG CANCER, RIGHT UPPER LOBE (HCC): Primary | ICD-10-CM

## 2019-01-01 DIAGNOSIS — F32.A DEPRESSION, UNSPECIFIED DEPRESSION TYPE: ICD-10-CM

## 2019-01-01 DIAGNOSIS — K21.00 GASTROESOPHAGEAL REFLUX DISEASE WITH ESOPHAGITIS: ICD-10-CM

## 2019-01-01 DIAGNOSIS — R91.8 ABNORMAL CT LUNG SCREENING: Primary | ICD-10-CM

## 2019-01-01 DIAGNOSIS — N42.89 PROSTATE MASS: ICD-10-CM

## 2019-01-01 DIAGNOSIS — R53.1 ACUTE RIGHT-SIDED WEAKNESS: ICD-10-CM

## 2019-01-01 DIAGNOSIS — R59.0 MEDIASTINAL ADENOPATHY: ICD-10-CM

## 2019-01-01 DIAGNOSIS — G89.4 CHRONIC PAIN SYNDROME: ICD-10-CM

## 2019-01-01 DIAGNOSIS — G89.29 CHRONIC NECK AND BACK PAIN: ICD-10-CM

## 2019-01-01 DIAGNOSIS — J90 PLEURAL EFFUSION: ICD-10-CM

## 2019-01-01 DIAGNOSIS — I63.9 CEREBROVASCULAR ACCIDENT (CVA), UNSPECIFIED MECHANISM (HCC): Primary | ICD-10-CM

## 2019-01-01 DIAGNOSIS — Z45.2 ENCOUNTER FOR CARE RELATED TO VASCULAR ACCESS PORT: ICD-10-CM

## 2019-01-01 DIAGNOSIS — R07.89 RIGHT-SIDED CHEST WALL PAIN: ICD-10-CM

## 2019-01-01 DIAGNOSIS — R07.81 RIB PAIN ON RIGHT SIDE: Primary | ICD-10-CM

## 2019-01-01 DIAGNOSIS — E78.49 OTHER HYPERLIPIDEMIA: Primary | ICD-10-CM

## 2019-01-01 DIAGNOSIS — M54.2 CHRONIC NECK AND BACK PAIN: ICD-10-CM

## 2019-01-01 DIAGNOSIS — M54.9 CHRONIC NECK AND BACK PAIN: ICD-10-CM

## 2019-01-01 DIAGNOSIS — R91.8 LUNG MASS: Primary | ICD-10-CM

## 2019-01-01 DIAGNOSIS — G89.29 OTHER CHRONIC PAIN: ICD-10-CM

## 2019-01-01 DIAGNOSIS — R91.8 MASS OF UPPER LOBE OF RIGHT LUNG: ICD-10-CM

## 2019-01-01 DIAGNOSIS — R47.01 EXPRESSIVE APHASIA: ICD-10-CM

## 2019-01-01 DIAGNOSIS — Z45.2 ENCOUNTER FOR CARE RELATED TO VASCULAR ACCESS PORT: Primary | ICD-10-CM

## 2019-01-01 DIAGNOSIS — C34.11 SMALL CELL LUNG CANCER, RIGHT UPPER LOBE (HCC): ICD-10-CM

## 2019-01-01 DIAGNOSIS — M48.9 MASS OF THORACIC VERTEBRA: ICD-10-CM

## 2019-01-01 DIAGNOSIS — C34.11 MALIGNANT NEOPLASM OF UPPER LOBE OF RIGHT LUNG (HCC): Primary | ICD-10-CM

## 2019-01-01 DIAGNOSIS — R91.8 MASS OF RIGHT LUNG: Primary | ICD-10-CM

## 2019-01-01 DIAGNOSIS — R09.89 ABNORMAL LUNG SOUNDS: ICD-10-CM

## 2019-01-01 DIAGNOSIS — Z12.5 SCREENING FOR PROSTATE CANCER: ICD-10-CM

## 2019-01-01 DIAGNOSIS — R07.89 CHEST WALL PAIN: Primary | ICD-10-CM

## 2019-01-01 DIAGNOSIS — E11.65 UNCONTROLLED TYPE 2 DIABETES MELLITUS WITH HYPERGLYCEMIA (HCC): ICD-10-CM

## 2019-01-01 DIAGNOSIS — R07.9 ACUTE CHEST PAIN: ICD-10-CM

## 2019-01-01 DIAGNOSIS — R93.89 ABNORMAL CXR: ICD-10-CM

## 2019-01-01 DIAGNOSIS — F17.200 CURRENT EVERY DAY SMOKER: ICD-10-CM

## 2019-01-01 DIAGNOSIS — IMO0002 UNCONTROLLED TYPE 2 DIABETES MELLITUS WITH COMPLICATION, WITHOUT LONG-TERM CURRENT USE OF INSULIN: ICD-10-CM

## 2019-01-01 DIAGNOSIS — I25.10 CHRONIC CORONARY ARTERY DISEASE: ICD-10-CM

## 2019-01-01 DIAGNOSIS — R16.0 LIVER MASS: ICD-10-CM

## 2019-01-01 LAB
ABO GROUP BLD: NORMAL
ALBUMIN SERPL-MCNC: 3.1 G/DL (ref 3.5–5.2)
ALBUMIN SERPL-MCNC: 3.2 G/DL (ref 3.5–5.2)
ALBUMIN SERPL-MCNC: 3.2 G/DL (ref 3.5–5.2)
ALBUMIN SERPL-MCNC: 3.3 G/DL (ref 3.5–5.2)
ALBUMIN SERPL-MCNC: 3.4 G/DL (ref 3.5–5.2)
ALBUMIN SERPL-MCNC: 3.6 G/DL (ref 3.5–5.2)
ALBUMIN SERPL-MCNC: 3.7 G/DL (ref 3.5–5.2)
ALBUMIN SERPL-MCNC: 3.8 G/DL (ref 3.5–5.2)
ALBUMIN SERPL-MCNC: 4.2 G/DL (ref 3.5–5.2)
ALBUMIN SERPL-MCNC: 4.7 G/DL (ref 3.5–5.2)
ALBUMIN UR-MCNC: 4.1 MG/DL
ALBUMIN/GLOB SERPL: 0.8 G/DL
ALBUMIN/GLOB SERPL: 0.9 G/DL
ALBUMIN/GLOB SERPL: 1.1 G/DL
ALBUMIN/GLOB SERPL: 1.7 G/DL
ALP SERPL-CCNC: 110 U/L (ref 39–117)
ALP SERPL-CCNC: 138 U/L (ref 39–117)
ALP SERPL-CCNC: 153 U/L (ref 39–117)
ALP SERPL-CCNC: 168 U/L (ref 39–117)
ALP SERPL-CCNC: 181 U/L (ref 39–117)
ALP SERPL-CCNC: 212 U/L (ref 39–117)
ALP SERPL-CCNC: 232 U/L (ref 39–117)
ALP SERPL-CCNC: 241 U/L (ref 39–117)
ALP SERPL-CCNC: 310 U/L (ref 39–117)
ALP SERPL-CCNC: 407 U/L (ref 39–117)
ALPHA-FETOPROTEIN: 1.71 NG/ML (ref 0–8.3)
ALT SERPL W P-5'-P-CCNC: 10 U/L (ref 1–41)
ALT SERPL W P-5'-P-CCNC: 11 U/L (ref 1–41)
ALT SERPL W P-5'-P-CCNC: 11 U/L (ref 1–41)
ALT SERPL W P-5'-P-CCNC: 14 U/L (ref 1–41)
ALT SERPL W P-5'-P-CCNC: 141 U/L (ref 1–41)
ALT SERPL W P-5'-P-CCNC: 15 U/L (ref 1–41)
ALT SERPL W P-5'-P-CCNC: 16 U/L (ref 1–41)
ALT SERPL W P-5'-P-CCNC: 16 U/L (ref 1–41)
ALT SERPL W P-5'-P-CCNC: 61 U/L (ref 1–41)
ALT SERPL W P-5'-P-CCNC: 82 U/L (ref 1–41)
ANION GAP SERPL CALCULATED.3IONS-SCNC: 10 MMOL/L (ref 5–15)
ANION GAP SERPL CALCULATED.3IONS-SCNC: 10 MMOL/L (ref 5–15)
ANION GAP SERPL CALCULATED.3IONS-SCNC: 10.8 MMOL/L (ref 5–15)
ANION GAP SERPL CALCULATED.3IONS-SCNC: 11 MMOL/L (ref 5–15)
ANION GAP SERPL CALCULATED.3IONS-SCNC: 11 MMOL/L (ref 5–15)
ANION GAP SERPL CALCULATED.3IONS-SCNC: 12 MMOL/L (ref 5–15)
ANION GAP SERPL CALCULATED.3IONS-SCNC: 13 MMOL/L (ref 5–15)
ANION GAP SERPL CALCULATED.3IONS-SCNC: 16 MMOL/L (ref 5–15)
ANION GAP SERPL CALCULATED.3IONS-SCNC: 16.1 MMOL/L (ref 5–15)
APPEARANCE FLD: ABNORMAL
APTT PPP: 24.6 SECONDS (ref 24–37)
APTT PPP: 27.4 SECONDS (ref 24–37)
ARTERIAL PATENCY WRIST A: ABNORMAL
AST SERPL-CCNC: 100 U/L (ref 1–40)
AST SERPL-CCNC: 16 U/L (ref 1–40)
AST SERPL-CCNC: 16 U/L (ref 1–40)
AST SERPL-CCNC: 18 U/L (ref 1–40)
AST SERPL-CCNC: 20 U/L (ref 1–40)
AST SERPL-CCNC: 21 U/L (ref 1–40)
AST SERPL-CCNC: 22 U/L (ref 1–40)
AST SERPL-CCNC: 53 U/L (ref 1–40)
AST SERPL-CCNC: 58 U/L (ref 1–40)
AST SERPL-CCNC: 72 U/L (ref 1–40)
ATMOSPHERIC PRESS: ABNORMAL MM[HG]
BACTERIA FLD CULT: NORMAL
BACTERIA SPEC ANAEROBE CULT: NORMAL
BASE EXCESS BLDA CALC-SCNC: 2.2 MMOL/L (ref 0–2)
BASOPHILS # BLD AUTO: 0.03 10*3/MM3 (ref 0–0.2)
BASOPHILS # BLD AUTO: 0.04 10*3/MM3 (ref 0–0.2)
BASOPHILS # BLD AUTO: 0.05 10*3/MM3 (ref 0–0.2)
BASOPHILS # BLD AUTO: 0.06 10*3/MM3 (ref 0–0.2)
BASOPHILS # BLD AUTO: 0.06 10*3/MM3 (ref 0–0.2)
BASOPHILS # BLD AUTO: 0.08 10*3/MM3 (ref 0–0.2)
BASOPHILS # BLD MANUAL: 0 10*3/MM3 (ref 0–0.2)
BASOPHILS NFR BLD AUTO: 0 % (ref 0–1.5)
BASOPHILS NFR BLD AUTO: 0.3 % (ref 0–1.5)
BASOPHILS NFR BLD AUTO: 0.4 % (ref 0–1.5)
BASOPHILS NFR BLD AUTO: 0.5 % (ref 0–1.5)
BASOPHILS NFR BLD AUTO: 0.6 % (ref 0–1.5)
BDY SITE: ABNORMAL
BH CV ECHO MEAS - AO MAX PG (FULL): 4.6 MMHG
BH CV ECHO MEAS - AO MAX PG: 6 MMHG
BH CV ECHO MEAS - AO MEAN PG (FULL): 2.6 MMHG
BH CV ECHO MEAS - AO MEAN PG: 3.4 MMHG
BH CV ECHO MEAS - AO ROOT AREA (BSA CORRECTED): 1.6
BH CV ECHO MEAS - AO ROOT AREA: 7.2 CM^2
BH CV ECHO MEAS - AO ROOT DIAM: 3 CM
BH CV ECHO MEAS - AO V2 MAX: 122.2 CM/SEC
BH CV ECHO MEAS - AO V2 MEAN: 87.4 CM/SEC
BH CV ECHO MEAS - AO V2 VTI: 20.3 CM
BH CV ECHO MEAS - AVA(I,A): 1.7 CM^2
BH CV ECHO MEAS - AVA(I,D): 1.7 CM^2
BH CV ECHO MEAS - AVA(V,A): 1.8 CM^2
BH CV ECHO MEAS - AVA(V,D): 1.8 CM^2
BH CV ECHO MEAS - BSA(HAYCOCK): 1.9 M^2
BH CV ECHO MEAS - BSA: 1.9 M^2
BH CV ECHO MEAS - BZI_BMI: 22.7 KILOGRAMS/M^2
BH CV ECHO MEAS - BZI_METRIC_HEIGHT: 177.8 CM
BH CV ECHO MEAS - BZI_METRIC_WEIGHT: 71.7 KG
BH CV ECHO MEAS - EDV(CUBED): 84.6 ML
BH CV ECHO MEAS - EDV(TEICH): 87.2 ML
BH CV ECHO MEAS - EF(CUBED): 53.5 %
BH CV ECHO MEAS - EF(TEICH): 45.6 %
BH CV ECHO MEAS - ESV(CUBED): 39.3 ML
BH CV ECHO MEAS - ESV(TEICH): 47.4 ML
BH CV ECHO MEAS - FS: 22.5 %
BH CV ECHO MEAS - IVS/LVPW: 1
BH CV ECHO MEAS - IVSD: 1.3 CM
BH CV ECHO MEAS - LA DIMENSION: 3.4 CM
BH CV ECHO MEAS - LA/AO: 1.1
BH CV ECHO MEAS - LAD MAJOR: 3.3 CM
BH CV ECHO MEAS - LAT PEAK E' VEL: 8.4 CM/SEC
BH CV ECHO MEAS - LATERAL E/E' RATIO: 6.2
BH CV ECHO MEAS - LV MASS(C)D: 208.8 GRAMS
BH CV ECHO MEAS - LV MASS(C)DI: 110.5 GRAMS/M^2
BH CV ECHO MEAS - LV MAX PG: 1.4 MMHG
BH CV ECHO MEAS - LV MEAN PG: 0.83 MMHG
BH CV ECHO MEAS - LV V1 MAX: 58.2 CM/SEC
BH CV ECHO MEAS - LV V1 MEAN: 42.1 CM/SEC
BH CV ECHO MEAS - LV V1 VTI: 9.2 CM
BH CV ECHO MEAS - LVIDD: 4.4 CM
BH CV ECHO MEAS - LVIDS: 3.4 CM
BH CV ECHO MEAS - LVOT AREA (M): 3.8 CM^2
BH CV ECHO MEAS - LVOT AREA: 3.7 CM^2
BH CV ECHO MEAS - LVOT DIAM: 2.2 CM
BH CV ECHO MEAS - LVPWD: 1.3 CM
BH CV ECHO MEAS - MED PEAK E' VEL: 6.4 CM/SEC
BH CV ECHO MEAS - MEDIAL E/E' RATIO: 8.2
BH CV ECHO MEAS - MV A MAX VEL: 81.9 CM/SEC
BH CV ECHO MEAS - MV DEC TIME: 0.11 SEC
BH CV ECHO MEAS - MV E MAX VEL: 53.3 CM/SEC
BH CV ECHO MEAS - MV E/A: 0.65
BH CV ECHO MEAS - SI(AO): 77.3 ML/M^2
BH CV ECHO MEAS - SI(CUBED): 24 ML/M^2
BH CV ECHO MEAS - SI(LVOT): 18.1 ML/M^2
BH CV ECHO MEAS - SI(TEICH): 21.1 ML/M^2
BH CV ECHO MEAS - SV(AO): 145.9 ML
BH CV ECHO MEAS - SV(CUBED): 45.3 ML
BH CV ECHO MEAS - SV(LVOT): 34.1 ML
BH CV ECHO MEAS - SV(TEICH): 39.8 ML
BH CV ECHO MEAS - TAPSE (>1.6): 1.9 CM2
BH CV ECHO MEASUREMENTS AVERAGE E/E' RATIO: 7.2
BH CV LOWER VASCULAR LEFT COMMON FEMORAL AUGMENT: NORMAL
BH CV LOWER VASCULAR LEFT COMMON FEMORAL COMPRESS: NORMAL
BH CV LOWER VASCULAR LEFT COMMON FEMORAL PHASIC: NORMAL
BH CV LOWER VASCULAR LEFT COMMON FEMORAL SPONT: NORMAL
BH CV LOWER VASCULAR LEFT DISTAL FEMORAL AUGMENT: NORMAL
BH CV LOWER VASCULAR LEFT DISTAL FEMORAL COMPRESS: NORMAL
BH CV LOWER VASCULAR LEFT DISTAL FEMORAL PHASIC: NORMAL
BH CV LOWER VASCULAR LEFT DISTAL FEMORAL SPONT: NORMAL
BH CV LOWER VASCULAR LEFT GASTRONEMIUS COMPRESS: NORMAL
BH CV LOWER VASCULAR LEFT GREATER SAPH AK COMPRESS: NORMAL
BH CV LOWER VASCULAR LEFT GREATER SAPH BK COMPRESS: NORMAL
BH CV LOWER VASCULAR LEFT LESSER SAPH COMPRESS: NORMAL
BH CV LOWER VASCULAR LEFT MID FEMORAL AUGMENT: NORMAL
BH CV LOWER VASCULAR LEFT MID FEMORAL COMPRESS: NORMAL
BH CV LOWER VASCULAR LEFT MID FEMORAL PHASIC: NORMAL
BH CV LOWER VASCULAR LEFT MID FEMORAL SPONT: NORMAL
BH CV LOWER VASCULAR LEFT PERONEAL AUGMENT: NORMAL
BH CV LOWER VASCULAR LEFT PERONEAL COMPRESS: NORMAL
BH CV LOWER VASCULAR LEFT POPLITEAL AUGMENT: NORMAL
BH CV LOWER VASCULAR LEFT POPLITEAL COMPRESS: NORMAL
BH CV LOWER VASCULAR LEFT POPLITEAL PHASIC: NORMAL
BH CV LOWER VASCULAR LEFT POPLITEAL SPONT: NORMAL
BH CV LOWER VASCULAR LEFT POSTERIOR TIBIAL AUGMENT: NORMAL
BH CV LOWER VASCULAR LEFT POSTERIOR TIBIAL COMPRESS: NORMAL
BH CV LOWER VASCULAR LEFT PROFUNDA FEMORAL AUGMENT: NORMAL
BH CV LOWER VASCULAR LEFT PROFUNDA FEMORAL COMPRESS: NORMAL
BH CV LOWER VASCULAR LEFT PROFUNDA FEMORAL PHASIC: NORMAL
BH CV LOWER VASCULAR LEFT PROFUNDA FEMORAL SPONT: NORMAL
BH CV LOWER VASCULAR LEFT PROXIMAL FEMORAL AUGMENT: NORMAL
BH CV LOWER VASCULAR LEFT PROXIMAL FEMORAL COMPRESS: NORMAL
BH CV LOWER VASCULAR LEFT PROXIMAL FEMORAL PHASIC: NORMAL
BH CV LOWER VASCULAR LEFT PROXIMAL FEMORAL SPONT: NORMAL
BH CV LOWER VASCULAR LEFT SAPHENOFEMORAL JUNCTION AUGMENT: NORMAL
BH CV LOWER VASCULAR LEFT SAPHENOFEMORAL JUNCTION COMPRESS: NORMAL
BH CV LOWER VASCULAR LEFT SAPHENOFEMORAL JUNCTION PHASIC: NORMAL
BH CV LOWER VASCULAR LEFT SAPHENOFEMORAL JUNCTION SPONT: NORMAL
BH CV LOWER VASCULAR RIGHT COMMON FEMORAL AUGMENT: NORMAL
BH CV LOWER VASCULAR RIGHT COMMON FEMORAL COMPRESS: NORMAL
BH CV LOWER VASCULAR RIGHT COMMON FEMORAL PHASIC: NORMAL
BH CV LOWER VASCULAR RIGHT COMMON FEMORAL SPONT: NORMAL
BH CV LOWER VASCULAR RIGHT DISTAL FEMORAL AUGMENT: NORMAL
BH CV LOWER VASCULAR RIGHT DISTAL FEMORAL COMPRESS: NORMAL
BH CV LOWER VASCULAR RIGHT DISTAL FEMORAL PHASIC: NORMAL
BH CV LOWER VASCULAR RIGHT GASTRONEMIUS COMPRESS: NORMAL
BH CV LOWER VASCULAR RIGHT GREATER SAPH AK COMPRESS: NORMAL
BH CV LOWER VASCULAR RIGHT GREATER SAPH BK COMPRESS: NORMAL
BH CV LOWER VASCULAR RIGHT LESSER SAPH COMPRESS: NORMAL
BH CV LOWER VASCULAR RIGHT MID FEMORAL AUGMENT: NORMAL
BH CV LOWER VASCULAR RIGHT MID FEMORAL COMPRESS: NORMAL
BH CV LOWER VASCULAR RIGHT MID FEMORAL PHASIC: NORMAL
BH CV LOWER VASCULAR RIGHT MID FEMORAL SPONT: NORMAL
BH CV LOWER VASCULAR RIGHT PERONEAL AUGMENT: NORMAL
BH CV LOWER VASCULAR RIGHT PERONEAL COMPRESS: NORMAL
BH CV LOWER VASCULAR RIGHT POPLITEAL AUGMENT: NORMAL
BH CV LOWER VASCULAR RIGHT POPLITEAL COMPRESS: NORMAL
BH CV LOWER VASCULAR RIGHT POPLITEAL PHASIC: NORMAL
BH CV LOWER VASCULAR RIGHT POPLITEAL SPONT: NORMAL
BH CV LOWER VASCULAR RIGHT POSTERIOR TIBIAL AUGMENT: NORMAL
BH CV LOWER VASCULAR RIGHT POSTERIOR TIBIAL COMPRESS: NORMAL
BH CV LOWER VASCULAR RIGHT PROFUNDA FEMORAL AUGMENT: NORMAL
BH CV LOWER VASCULAR RIGHT PROFUNDA FEMORAL COMPRESS: NORMAL
BH CV LOWER VASCULAR RIGHT PROFUNDA FEMORAL PHASIC: NORMAL
BH CV LOWER VASCULAR RIGHT PROFUNDA FEMORAL SPONT: NORMAL
BH CV LOWER VASCULAR RIGHT PROXIMAL FEMORAL AUGMENT: NORMAL
BH CV LOWER VASCULAR RIGHT PROXIMAL FEMORAL COMPRESS: NORMAL
BH CV LOWER VASCULAR RIGHT PROXIMAL FEMORAL PHASIC: NORMAL
BH CV LOWER VASCULAR RIGHT PROXIMAL FEMORAL SPONT: NORMAL
BH CV LOWER VASCULAR RIGHT SAPHENOFEMORAL JUNCTION AUGMENT: NORMAL
BH CV LOWER VASCULAR RIGHT SAPHENOFEMORAL JUNCTION COMPRESS: NORMAL
BH CV LOWER VASCULAR RIGHT SAPHENOFEMORAL JUNCTION PHASIC: NORMAL
BH CV LOWER VASCULAR RIGHT SAPHENOFEMORAL JUNCTION SPONT: NORMAL
BH CV VAS BP RIGHT ARM: NORMAL MMHG
BH CV XLRA - RV BASE: 3 CM
BH CV XLRA - RV LENGTH: 7 CM
BH CV XLRA - RV MID: 2.7 CM
BH CV XLRA - TDI S': 11 CM/SEC
BH CV XLRA MEAS LEFT CCA RATIO VEL: 71.7 CM/SEC
BH CV XLRA MEAS LEFT DIST CCA EDV: 23.9 CM/SEC
BH CV XLRA MEAS LEFT DIST CCA PSV: 72.3 CM/SEC
BH CV XLRA MEAS LEFT DIST ICA EDV: 28.4 CM/SEC
BH CV XLRA MEAS LEFT DIST ICA PSV: 77.7 CM/SEC
BH CV XLRA MEAS LEFT ICA RATIO VEL: 74.8 CM/SEC
BH CV XLRA MEAS LEFT ICA/CCA RATIO: 1
BH CV XLRA MEAS LEFT MID CCA EDV: 22 CM/SEC
BH CV XLRA MEAS LEFT MID CCA PSV: 83 CM/SEC
BH CV XLRA MEAS LEFT MID ICA EDV: 28 CM/SEC
BH CV XLRA MEAS LEFT MID ICA PSV: 75.2 CM/SEC
BH CV XLRA MEAS LEFT PROX CCA EDV: 25.1 CM/SEC
BH CV XLRA MEAS LEFT PROX CCA PSV: 113.1 CM/SEC
BH CV XLRA MEAS LEFT PROX ECA PSV: 77.1 CM/SEC
BH CV XLRA MEAS LEFT PROX ICA EDV: 15.7 CM/SEC
BH CV XLRA MEAS LEFT PROX ICA PSV: 59.4 CM/SEC
BH CV XLRA MEAS LEFT PROX SCLA PSV: 121.2 CM/SEC
BH CV XLRA MEAS LEFT VERTEBRAL A EDV: 20.6 CM/SEC
BH CV XLRA MEAS LEFT VERTEBRAL A PSV: 69.2 CM/SEC
BH CV XLRA MEAS RIGHT CCA RATIO VEL: 76.6 CM/SEC
BH CV XLRA MEAS RIGHT DIST CCA EDV: 16.5 CM/SEC
BH CV XLRA MEAS RIGHT DIST CCA PSV: 77.2 CM/SEC
BH CV XLRA MEAS RIGHT DIST ICA EDV: 34.2 CM/SEC
BH CV XLRA MEAS RIGHT DIST ICA PSV: 102 CM/SEC
BH CV XLRA MEAS RIGHT ICA RATIO VEL: 84.9 CM/SEC
BH CV XLRA MEAS RIGHT ICA/CCA RATIO: 1.1
BH CV XLRA MEAS RIGHT MID CCA EDV: 26.5 CM/SEC
BH CV XLRA MEAS RIGHT MID CCA PSV: 83.8 CM/SEC
BH CV XLRA MEAS RIGHT MID ICA EDV: 31.4 CM/SEC
BH CV XLRA MEAS RIGHT MID ICA PSV: 85.5 CM/SEC
BH CV XLRA MEAS RIGHT PROX CCA EDV: 18.7 CM/SEC
BH CV XLRA MEAS RIGHT PROX CCA PSV: 76.6 CM/SEC
BH CV XLRA MEAS RIGHT PROX ECA PSV: 78.2 CM/SEC
BH CV XLRA MEAS RIGHT PROX ICA EDV: 18.2 CM/SEC
BH CV XLRA MEAS RIGHT PROX ICA PSV: 75 CM/SEC
BH CV XLRA MEAS RIGHT PROX SCLA PSV: 116.3 CM/SEC
BH CV XLRA MEAS RIGHT VERTEBRAL A EDV: 18.9 CM/SEC
BH CV XLRA MEAS RIGHT VERTEBRAL A PSV: 62.9 CM/SEC
BILIRUB SERPL-MCNC: 0.2 MG/DL (ref 0.2–1.2)
BILIRUB SERPL-MCNC: 0.3 MG/DL (ref 0.2–1.2)
BILIRUB SERPL-MCNC: 0.4 MG/DL (ref 0.2–1.2)
BILIRUB SERPL-MCNC: 0.5 MG/DL (ref 0.2–1.2)
BILIRUB SERPL-MCNC: 0.8 MG/DL (ref 0.2–1.2)
BILIRUB UR QL STRIP: NEGATIVE
BLD GP AB SCN SERPL QL: NEGATIVE
BLD GP AB SCN SERPL QL: NEGATIVE
BODY TEMPERATURE: 37 C
BUN BLD-MCNC: 11 MG/DL (ref 8–23)
BUN BLD-MCNC: 12 MG/DL (ref 8–23)
BUN BLD-MCNC: 12 MG/DL (ref 8–23)
BUN BLD-MCNC: 14 MG/DL (ref 8–23)
BUN BLD-MCNC: 19 MG/DL (ref 8–23)
BUN BLD-MCNC: 19 MG/DL (ref 8–23)
BUN BLD-MCNC: 20 MG/DL (ref 8–23)
BUN BLD-MCNC: 21 MG/DL (ref 8–23)
BUN BLD-MCNC: 22 MG/DL (ref 8–23)
BUN BLD-MCNC: 23 MG/DL (ref 8–23)
BUN BLD-MCNC: 25 MG/DL (ref 8–23)
BUN BLDA-MCNC: 14 MG/DL (ref 8–26)
BUN BLDA-MCNC: 27 MG/DL (ref 8–26)
BUN/CREAT SERPL: 12 (ref 7–25)
BUN/CREAT SERPL: 15 (ref 7–25)
BUN/CREAT SERPL: 16.1 (ref 7–25)
BUN/CREAT SERPL: 16.4 (ref 7–25)
BUN/CREAT SERPL: 21.3 (ref 7–25)
BUN/CREAT SERPL: 23.2 (ref 7–25)
BUN/CREAT SERPL: 23.3 (ref 7–25)
BUN/CREAT SERPL: 24.2 (ref 7–25)
BUN/CREAT SERPL: 24.7 (ref 7–25)
BUN/CREAT SERPL: 25 (ref 7–25)
BUN/CREAT SERPL: 25.5 (ref 7–25)
BUN/CREAT SERPL: 26.3 (ref 7–25)
BUN/CREAT SERPL: 28 (ref 7–25)
BUN/CREAT SERPL: 28.1 (ref 7–25)
BUN/CREAT SERPL: 29.6 (ref 7–25)
CA-I BLDA-SCNC: 1.08 MMOL/L (ref 1.2–1.32)
CA-I BLDA-SCNC: 1.21 MMOL/L (ref 1.2–1.32)
CALCIUM SPEC-SCNC: 10 MG/DL (ref 8.6–10.5)
CALCIUM SPEC-SCNC: 10.5 MG/DL (ref 8.6–10.5)
CALCIUM SPEC-SCNC: 8.3 MG/DL (ref 8.6–10.5)
CALCIUM SPEC-SCNC: 8.7 MG/DL (ref 8.6–10.5)
CALCIUM SPEC-SCNC: 8.7 MG/DL (ref 8.6–10.5)
CALCIUM SPEC-SCNC: 8.8 MG/DL (ref 8.6–10.5)
CALCIUM SPEC-SCNC: 8.8 MG/DL (ref 8.6–10.5)
CALCIUM SPEC-SCNC: 9 MG/DL (ref 8.6–10.5)
CALCIUM SPEC-SCNC: 9.2 MG/DL (ref 8.6–10.5)
CALCIUM SPEC-SCNC: 9.2 MG/DL (ref 8.6–10.5)
CALCIUM SPEC-SCNC: 9.4 MG/DL (ref 8.6–10.5)
CALCIUM SPEC-SCNC: 9.6 MG/DL (ref 8.6–10.5)
CALCIUM SPEC-SCNC: 9.6 MG/DL (ref 8.6–10.5)
CALCIUM SPEC-SCNC: 9.8 MG/DL (ref 8.6–10.5)
CALCIUM SPEC-SCNC: 9.9 MG/DL (ref 8.6–10.5)
CHLORIDE BLDA-SCNC: 102 MMOL/L (ref 98–109)
CHLORIDE BLDA-SCNC: 97 MMOL/L (ref 98–109)
CHLORIDE SERPL-SCNC: 100 MMOL/L (ref 98–107)
CHLORIDE SERPL-SCNC: 101 MMOL/L (ref 98–107)
CHLORIDE SERPL-SCNC: 101 MMOL/L (ref 98–107)
CHLORIDE SERPL-SCNC: 102 MMOL/L (ref 98–107)
CHLORIDE SERPL-SCNC: 91 MMOL/L (ref 98–107)
CHLORIDE SERPL-SCNC: 92 MMOL/L (ref 98–107)
CHLORIDE SERPL-SCNC: 95 MMOL/L (ref 98–107)
CHLORIDE SERPL-SCNC: 96 MMOL/L (ref 98–107)
CHLORIDE SERPL-SCNC: 96 MMOL/L (ref 98–107)
CHLORIDE SERPL-SCNC: 98 MMOL/L (ref 98–107)
CHOLEST SERPL-MCNC: 104 MG/DL (ref 0–200)
CHOLEST SERPL-MCNC: 105 MG/DL (ref 0–200)
CHOLEST SERPL-MCNC: 153 MG/DL (ref 0–200)
CLARITY UR: CLEAR
CO2 BLDA-SCNC: 28 MMOL/L (ref 24–29)
CO2 BLDA-SCNC: 31 MMOL/L (ref 24–29)
CO2 SERPL-SCNC: 24 MMOL/L (ref 22–29)
CO2 SERPL-SCNC: 24.2 MMOL/L (ref 22–29)
CO2 SERPL-SCNC: 25 MMOL/L (ref 22–29)
CO2 SERPL-SCNC: 25 MMOL/L (ref 22–29)
CO2 SERPL-SCNC: 26 MMOL/L (ref 22–29)
CO2 SERPL-SCNC: 26 MMOL/L (ref 22–29)
CO2 SERPL-SCNC: 26.9 MMOL/L (ref 22–29)
CO2 SERPL-SCNC: 27 MMOL/L (ref 22–29)
CO2 SERPL-SCNC: 27 MMOL/L (ref 22–29)
CO2 SERPL-SCNC: 28 MMOL/L (ref 22–29)
CO2 SERPL-SCNC: 29 MMOL/L (ref 22–29)
CO2 SERPL-SCNC: 30 MMOL/L (ref 22–29)
CO2 SERPL-SCNC: 30 MMOL/L (ref 22–29)
CO2 SERPL-SCNC: 31 MMOL/L (ref 22–29)
CO2 SERPL-SCNC: 31 MMOL/L (ref 22–29)
COHGB MFR BLD: 1.7 % (ref 0–2)
COLOR FLD: ABNORMAL
COLOR UR: YELLOW
CREAT BLD-MCNC: 0.71 MG/DL (ref 0.76–1.27)
CREAT BLD-MCNC: 0.73 MG/DL (ref 0.76–1.27)
CREAT BLD-MCNC: 0.75 MG/DL (ref 0.76–1.27)
CREAT BLD-MCNC: 0.76 MG/DL (ref 0.76–1.27)
CREAT BLD-MCNC: 0.77 MG/DL (ref 0.76–1.27)
CREAT BLD-MCNC: 0.8 MG/DL (ref 0.76–1.27)
CREAT BLD-MCNC: 0.82 MG/DL (ref 0.76–1.27)
CREAT BLD-MCNC: 0.84 MG/DL (ref 0.76–1.27)
CREAT BLD-MCNC: 0.86 MG/DL (ref 0.76–1.27)
CREAT BLD-MCNC: 0.87 MG/DL (ref 0.76–1.27)
CREAT BLD-MCNC: 0.88 MG/DL (ref 0.76–1.27)
CREAT BLD-MCNC: 0.89 MG/DL (ref 0.76–1.27)
CREAT BLD-MCNC: 0.92 MG/DL (ref 0.76–1.27)
CREAT BLD-MCNC: 0.94 MG/DL (ref 0.76–1.27)
CREAT BLD-MCNC: 0.95 MG/DL (ref 0.76–1.27)
CREAT BLD-MCNC: 0.98 MG/DL (ref 0.76–1.27)
CREAT BLD-MCNC: 1 MG/DL (ref 0.76–1.27)
CREAT BLDA-MCNC: 0.7 MG/DL (ref 0.6–1.3)
CREAT BLDA-MCNC: 0.8 MG/DL (ref 0.6–1.3)
CREAT BLDA-MCNC: 0.9 MG/DL (ref 0.6–1.3)
CREAT BLDA-MCNC: 1 MG/DL (ref 0.6–1.3)
CREAT BLDA-MCNC: 1 MG/DL (ref 0.6–1.3)
CREAT UR-MCNC: 120.7 MG/DL
CYTO UR: NORMAL
D DIMER PPP FEU-MCNC: 3.92 MCGFEU/ML (ref 0–0.56)
DEPRECATED RDW RBC AUTO: 46.4 FL (ref 37–54)
DEPRECATED RDW RBC AUTO: 47.3 FL (ref 37–54)
DEPRECATED RDW RBC AUTO: 47.4 FL (ref 37–54)
DEPRECATED RDW RBC AUTO: 47.8 FL (ref 37–54)
DEPRECATED RDW RBC AUTO: 47.8 FL (ref 37–54)
DEPRECATED RDW RBC AUTO: 48.2 FL (ref 37–54)
DEPRECATED RDW RBC AUTO: 48.3 FL (ref 37–54)
DEPRECATED RDW RBC AUTO: 49 FL (ref 37–54)
DEPRECATED RDW RBC AUTO: 49.2 FL (ref 37–54)
DEPRECATED RDW RBC AUTO: 49.4 FL (ref 37–54)
DEPRECATED RDW RBC AUTO: 49.6 FL (ref 37–54)
DEPRECATED RDW RBC AUTO: 51.1 FL (ref 37–54)
DEPRECATED RDW RBC AUTO: 59.5 FL (ref 37–54)
EOSINOPHIL # BLD AUTO: 0.03 10*3/MM3 (ref 0–0.4)
EOSINOPHIL # BLD AUTO: 0.08 10*3/MM3 (ref 0–0.4)
EOSINOPHIL # BLD AUTO: 0.18 10*3/MM3 (ref 0–0.4)
EOSINOPHIL # BLD AUTO: 0.19 10*3/MM3 (ref 0–0.4)
EOSINOPHIL # BLD AUTO: 0.21 10*3/MM3 (ref 0–0.4)
EOSINOPHIL # BLD AUTO: 0.28 10*3/MM3 (ref 0–0.4)
EOSINOPHIL # BLD MANUAL: 0 10*3/MM3 (ref 0–0.4)
EOSINOPHIL NFR BLD AUTO: 0.3 % (ref 0.3–6.2)
EOSINOPHIL NFR BLD AUTO: 0.3 % (ref 0.3–6.2)
EOSINOPHIL NFR BLD AUTO: 1.5 % (ref 0.3–6.2)
EOSINOPHIL NFR BLD AUTO: 1.7 % (ref 0.3–6.2)
EOSINOPHIL NFR BLD AUTO: 2.1 % (ref 0.3–6.2)
EOSINOPHIL NFR BLD AUTO: 2.5 % (ref 0.3–6.2)
EOSINOPHIL NFR BLD MANUAL: 0 % (ref 0.3–6.2)
EPAP: 0
ERYTHROCYTE [DISTWIDTH] IN BLOOD BY AUTOMATED COUNT: 13.2 % (ref 12.3–15.4)
ERYTHROCYTE [DISTWIDTH] IN BLOOD BY AUTOMATED COUNT: 13.3 % (ref 12.3–15.4)
ERYTHROCYTE [DISTWIDTH] IN BLOOD BY AUTOMATED COUNT: 13.3 % (ref 12.3–15.4)
ERYTHROCYTE [DISTWIDTH] IN BLOOD BY AUTOMATED COUNT: 13.4 % (ref 12.3–15.4)
ERYTHROCYTE [DISTWIDTH] IN BLOOD BY AUTOMATED COUNT: 13.6 % (ref 12.3–15.4)
ERYTHROCYTE [DISTWIDTH] IN BLOOD BY AUTOMATED COUNT: 14.4 % (ref 12.3–15.4)
ERYTHROCYTE [DISTWIDTH] IN BLOOD BY AUTOMATED COUNT: 15.3 % (ref 12.3–15.4)
ERYTHROCYTE [DISTWIDTH] IN BLOOD BY AUTOMATED COUNT: 15.5 % (ref 12.3–15.4)
ERYTHROCYTE [DISTWIDTH] IN BLOOD BY AUTOMATED COUNT: 15.7 % (ref 12.3–15.4)
ERYTHROCYTE [DISTWIDTH] IN BLOOD BY AUTOMATED COUNT: 16.6 % (ref 12.3–15.4)
ERYTHROCYTE [DISTWIDTH] IN BLOOD BY AUTOMATED COUNT: 19.5 % (ref 12.3–15.4)
ERYTHROCYTE [DISTWIDTH] IN BLOOD BY AUTOMATED COUNT: 20.5 % (ref 12.3–15.4)
ERYTHROCYTE [DISTWIDTH] IN BLOOD BY AUTOMATED COUNT: 24.2 % (ref 12.3–15.4)
GFR SERPL CREATININE-BSD FRML MDRD: 100 ML/MIN/1.73
GFR SERPL CREATININE-BSD FRML MDRD: 102 ML/MIN/1.73
GFR SERPL CREATININE-BSD FRML MDRD: 104 ML/MIN/1.73
GFR SERPL CREATININE-BSD FRML MDRD: 107 ML/MIN/1.73
GFR SERPL CREATININE-BSD FRML MDRD: 110 ML/MIN/1.73
GFR SERPL CREATININE-BSD FRML MDRD: 74 ML/MIN/1.73
GFR SERPL CREATININE-BSD FRML MDRD: 76 ML/MIN/1.73
GFR SERPL CREATININE-BSD FRML MDRD: 79 ML/MIN/1.73
GFR SERPL CREATININE-BSD FRML MDRD: 80 ML/MIN/1.73
GFR SERPL CREATININE-BSD FRML MDRD: 82 ML/MIN/1.73
GFR SERPL CREATININE-BSD FRML MDRD: 85 ML/MIN/1.73
GFR SERPL CREATININE-BSD FRML MDRD: 86 ML/MIN/1.73
GFR SERPL CREATININE-BSD FRML MDRD: 87 ML/MIN/1.73
GFR SERPL CREATININE-BSD FRML MDRD: 88 ML/MIN/1.73
GFR SERPL CREATININE-BSD FRML MDRD: 91 ML/MIN/1.73
GFR SERPL CREATININE-BSD FRML MDRD: 93 ML/MIN/1.73
GFR SERPL CREATININE-BSD FRML MDRD: 96 ML/MIN/1.73
GLOBULIN UR ELPH-MCNC: 2.7 GM/DL
GLOBULIN UR ELPH-MCNC: 3.1 GM/DL
GLOBULIN UR ELPH-MCNC: 3.4 GM/DL
GLOBULIN UR ELPH-MCNC: 3.6 GM/DL
GLOBULIN UR ELPH-MCNC: 3.8 GM/DL
GLOBULIN UR ELPH-MCNC: 3.9 GM/DL
GLOBULIN UR ELPH-MCNC: 4.1 GM/DL
GLOBULIN UR ELPH-MCNC: 4.5 GM/DL
GLUCOSE BLD-MCNC: 102 MG/DL (ref 65–99)
GLUCOSE BLD-MCNC: 138 MG/DL (ref 65–99)
GLUCOSE BLD-MCNC: 167 MG/DL (ref 65–99)
GLUCOSE BLD-MCNC: 172 MG/DL (ref 65–99)
GLUCOSE BLD-MCNC: 189 MG/DL (ref 65–99)
GLUCOSE BLD-MCNC: 201 MG/DL (ref 65–99)
GLUCOSE BLD-MCNC: 225 MG/DL (ref 65–99)
GLUCOSE BLD-MCNC: 232 MG/DL (ref 65–99)
GLUCOSE BLD-MCNC: 232 MG/DL (ref 65–99)
GLUCOSE BLD-MCNC: 234 MG/DL (ref 65–99)
GLUCOSE BLD-MCNC: 235 MG/DL (ref 65–99)
GLUCOSE BLD-MCNC: 252 MG/DL (ref 65–99)
GLUCOSE BLD-MCNC: 253 MG/DL (ref 65–99)
GLUCOSE BLD-MCNC: 255 MG/DL (ref 65–99)
GLUCOSE BLD-MCNC: 282 MG/DL (ref 65–99)
GLUCOSE BLD-MCNC: 288 MG/DL (ref 65–99)
GLUCOSE BLD-MCNC: 302 MG/DL (ref 65–99)
GLUCOSE BLDC GLUCOMTR-MCNC: 101 MG/DL (ref 70–130)
GLUCOSE BLDC GLUCOMTR-MCNC: 119 MG/DL (ref 70–130)
GLUCOSE BLDC GLUCOMTR-MCNC: 127 MG/DL (ref 70–130)
GLUCOSE BLDC GLUCOMTR-MCNC: 129 MG/DL (ref 70–130)
GLUCOSE BLDC GLUCOMTR-MCNC: 131 MG/DL (ref 70–130)
GLUCOSE BLDC GLUCOMTR-MCNC: 135 MG/DL (ref 70–130)
GLUCOSE BLDC GLUCOMTR-MCNC: 137 MG/DL (ref 70–130)
GLUCOSE BLDC GLUCOMTR-MCNC: 138 MG/DL (ref 70–130)
GLUCOSE BLDC GLUCOMTR-MCNC: 144 MG/DL (ref 70–130)
GLUCOSE BLDC GLUCOMTR-MCNC: 147 MG/DL (ref 70–130)
GLUCOSE BLDC GLUCOMTR-MCNC: 153 MG/DL (ref 70–130)
GLUCOSE BLDC GLUCOMTR-MCNC: 155 MG/DL (ref 70–130)
GLUCOSE BLDC GLUCOMTR-MCNC: 156 MG/DL (ref 70–130)
GLUCOSE BLDC GLUCOMTR-MCNC: 158 MG/DL (ref 70–130)
GLUCOSE BLDC GLUCOMTR-MCNC: 159 MG/DL (ref 70–130)
GLUCOSE BLDC GLUCOMTR-MCNC: 160 MG/DL (ref 70–130)
GLUCOSE BLDC GLUCOMTR-MCNC: 161 MG/DL (ref 70–130)
GLUCOSE BLDC GLUCOMTR-MCNC: 167 MG/DL (ref 70–130)
GLUCOSE BLDC GLUCOMTR-MCNC: 177 MG/DL (ref 70–130)
GLUCOSE BLDC GLUCOMTR-MCNC: 178 MG/DL (ref 70–130)
GLUCOSE BLDC GLUCOMTR-MCNC: 179 MG/DL (ref 70–130)
GLUCOSE BLDC GLUCOMTR-MCNC: 180 MG/DL (ref 70–130)
GLUCOSE BLDC GLUCOMTR-MCNC: 180 MG/DL (ref 70–130)
GLUCOSE BLDC GLUCOMTR-MCNC: 183 MG/DL (ref 70–130)
GLUCOSE BLDC GLUCOMTR-MCNC: 190 MG/DL (ref 70–130)
GLUCOSE BLDC GLUCOMTR-MCNC: 191 MG/DL (ref 70–130)
GLUCOSE BLDC GLUCOMTR-MCNC: 193 MG/DL (ref 70–130)
GLUCOSE BLDC GLUCOMTR-MCNC: 197 MG/DL (ref 70–130)
GLUCOSE BLDC GLUCOMTR-MCNC: 199 MG/DL (ref 70–130)
GLUCOSE BLDC GLUCOMTR-MCNC: 199 MG/DL (ref 70–130)
GLUCOSE BLDC GLUCOMTR-MCNC: 205 MG/DL (ref 70–130)
GLUCOSE BLDC GLUCOMTR-MCNC: 210 MG/DL (ref 70–130)
GLUCOSE BLDC GLUCOMTR-MCNC: 211 MG/DL (ref 70–130)
GLUCOSE BLDC GLUCOMTR-MCNC: 215 MG/DL (ref 70–130)
GLUCOSE BLDC GLUCOMTR-MCNC: 217 MG/DL (ref 70–130)
GLUCOSE BLDC GLUCOMTR-MCNC: 217 MG/DL (ref 70–130)
GLUCOSE BLDC GLUCOMTR-MCNC: 222 MG/DL (ref 70–130)
GLUCOSE BLDC GLUCOMTR-MCNC: 223 MG/DL (ref 70–130)
GLUCOSE BLDC GLUCOMTR-MCNC: 233 MG/DL (ref 70–130)
GLUCOSE BLDC GLUCOMTR-MCNC: 248 MG/DL (ref 70–130)
GLUCOSE BLDC GLUCOMTR-MCNC: 253 MG/DL (ref 70–130)
GLUCOSE BLDC GLUCOMTR-MCNC: 254 MG/DL (ref 70–130)
GLUCOSE BLDC GLUCOMTR-MCNC: 265 MG/DL (ref 70–130)
GLUCOSE BLDC GLUCOMTR-MCNC: 274 MG/DL (ref 70–130)
GLUCOSE BLDC GLUCOMTR-MCNC: 287 MG/DL (ref 70–130)
GLUCOSE BLDC GLUCOMTR-MCNC: 305 MG/DL (ref 70–130)
GLUCOSE BLDC GLUCOMTR-MCNC: 355 MG/DL (ref 70–130)
GLUCOSE BLDC GLUCOMTR-MCNC: 77 MG/DL (ref 70–130)
GLUCOSE BLDC GLUCOMTR-MCNC: >599 MG/DL (ref 70–130)
GLUCOSE FLD-MCNC: 221 MG/DL
GLUCOSE UR STRIP-MCNC: ABNORMAL MG/DL
GRAM STN SPEC: NORMAL
HAV IGM SERPL QL IA: ABNORMAL
HBA1C MFR BLD: 7.36 % (ref 4.8–5.6)
HBA1C MFR BLD: 7.4 % (ref 4.8–5.6)
HBA1C MFR BLD: 8.7 % (ref 4.8–5.6)
HBA1C MFR BLD: 8.84 % (ref 4.8–5.6)
HBV CORE IGM SERPL QL IA: ABNORMAL
HBV SURFACE AG SERPL QL IA: ABNORMAL
HCO3 BLDA-SCNC: 27.2 MMOL/L (ref 20–26)
HCT VFR BLD AUTO: 23.3 % (ref 37.5–51)
HCT VFR BLD AUTO: 25.7 % (ref 37.5–51)
HCT VFR BLD AUTO: 26.1 % (ref 37.5–51)
HCT VFR BLD AUTO: 32.4 % (ref 37.5–51)
HCT VFR BLD AUTO: 33.1 % (ref 37.5–51)
HCT VFR BLD AUTO: 33.4 % (ref 37.5–51)
HCT VFR BLD AUTO: 35.6 % (ref 37.5–51)
HCT VFR BLD AUTO: 38.3 % (ref 37.5–51)
HCT VFR BLD AUTO: 41.6 % (ref 37.5–51)
HCT VFR BLD AUTO: 41.9 % (ref 37.5–51)
HCT VFR BLD AUTO: 42.4 % (ref 37.5–51)
HCT VFR BLD AUTO: 44.3 % (ref 37.5–51)
HCT VFR BLD AUTO: 44.7 % (ref 37.5–51)
HCT VFR BLD AUTO: 46.5 % (ref 37.5–51)
HCT VFR BLD AUTO: 46.6 % (ref 37.5–51)
HCT VFR BLD AUTO: 47.5 % (ref 37.5–51)
HCT VFR BLD AUTO: 48 % (ref 37.5–51)
HCT VFR BLD CALC: 40.2 %
HCT VFR BLDA CALC: 37 % (ref 38–51)
HCT VFR BLDA CALC: 45 % (ref 38–51)
HCV AB SER DONR QL: REACTIVE
HCV AB SER DONR QL: REACTIVE
HCV RNA SERPL NAA+PROBE-ACNC: NORMAL IU/ML
HDLC SERPL-MCNC: 24 MG/DL (ref 40–60)
HDLC SERPL-MCNC: 24 MG/DL (ref 40–60)
HDLC SERPL-MCNC: 42 MG/DL (ref 40–60)
HGB BLD-MCNC: 10.1 G/DL (ref 13–17.7)
HGB BLD-MCNC: 10.7 G/DL (ref 13–17.7)
HGB BLD-MCNC: 10.8 G/DL (ref 13–17.7)
HGB BLD-MCNC: 11.7 G/DL (ref 13–17.7)
HGB BLD-MCNC: 12 G/DL (ref 13–17.7)
HGB BLD-MCNC: 13.1 G/DL (ref 13–17.7)
HGB BLD-MCNC: 13.2 G/DL (ref 13–17.7)
HGB BLD-MCNC: 13.4 G/DL (ref 13–17.7)
HGB BLD-MCNC: 14 G/DL (ref 13–17.7)
HGB BLD-MCNC: 14.3 G/DL (ref 13–17.7)
HGB BLD-MCNC: 14.7 G/DL (ref 13–17.7)
HGB BLD-MCNC: 14.9 G/DL (ref 13–17.7)
HGB BLD-MCNC: 15 G/DL (ref 13–17.7)
HGB BLD-MCNC: 15.4 G/DL (ref 13–17.7)
HGB BLD-MCNC: 7.5 G/DL (ref 13–17.7)
HGB BLD-MCNC: 8.6 G/DL (ref 13–17.7)
HGB BLD-MCNC: 8.6 G/DL (ref 13–17.7)
HGB BLDA-MCNC: 12.6 G/DL (ref 12–17)
HGB BLDA-MCNC: 13.1 G/DL (ref 13.5–17.5)
HGB BLDA-MCNC: 15.3 G/DL (ref 12–17)
HGB UR QL STRIP.AUTO: NEGATIVE
HOLD SPECIMEN: NORMAL
HOROWITZ INDEX BLD+IHG-RTO: 80 %
IMM GRANULOCYTES # BLD AUTO: 0.04 10*3/MM3 (ref 0–0.05)
IMM GRANULOCYTES # BLD AUTO: 0.05 10*3/MM3 (ref 0–0.05)
IMM GRANULOCYTES # BLD AUTO: 1.21 10*3/MM3 (ref 0–0.05)
IMM GRANULOCYTES NFR BLD AUTO: 0.3 % (ref 0–0.5)
IMM GRANULOCYTES NFR BLD AUTO: 0.4 % (ref 0–0.5)
IMM GRANULOCYTES NFR BLD AUTO: 0.5 % (ref 0–0.5)
IMM GRANULOCYTES NFR BLD AUTO: 4.4 % (ref 0–0.5)
INR PPP: 1.06 (ref 0.85–1.16)
INR PPP: 1.09 (ref 0.85–1.16)
INR PPP: 1.2 (ref 0.8–1.2)
IPAP: 0
KETONES UR QL STRIP: ABNORMAL
LAB AP CASE REPORT: NORMAL
LAB AP CLINICAL INFORMATION: NORMAL
LAB AP FLOW CYTOMETRY SUMMARY: NORMAL
LDH FLD-CCNC: 973 U/L
LDLC SERPL CALC-MCNC: 27 MG/DL (ref 0–100)
LDLC SERPL CALC-MCNC: 31 MG/DL (ref 0–100)
LDLC SERPL CALC-MCNC: 77 MG/DL (ref 0–100)
LDLC/HDLC SERPL: 1.14 {RATIO}
LDLC/HDLC SERPL: 1.28 {RATIO}
LDLC/HDLC SERPL: 1.82 {RATIO}
LEUKOCYTE ESTERASE UR QL STRIP.AUTO: NEGATIVE
LIPASE SERPL-CCNC: 43 U/L (ref 13–60)
LV EF 2D ECHO EST: 60 %
LYMPHOCYTES # BLD AUTO: 0.44 10*3/MM3 (ref 0.7–3.1)
LYMPHOCYTES # BLD AUTO: 1 10*3/MM3 (ref 0.7–3.1)
LYMPHOCYTES # BLD AUTO: 1.04 10*3/MM3 (ref 0.7–3.1)
LYMPHOCYTES # BLD AUTO: 1.16 10*3/MM3 (ref 0.7–3.1)
LYMPHOCYTES # BLD AUTO: 1.3 10*3/MM3 (ref 0.7–3.1)
LYMPHOCYTES # BLD AUTO: 1.6 10*3/MM3 (ref 0.7–3.1)
LYMPHOCYTES # BLD AUTO: 2.1 10*3/MM3 (ref 0.7–3.1)
LYMPHOCYTES # BLD AUTO: 2.2 10*3/MM3 (ref 0.7–3.1)
LYMPHOCYTES # BLD AUTO: 2.2 10*3/MM3 (ref 0.7–3.1)
LYMPHOCYTES # BLD AUTO: 3.35 10*3/MM3 (ref 0.7–3.1)
LYMPHOCYTES # BLD MANUAL: 2.45 10*3/MM3 (ref 0.7–3.1)
LYMPHOCYTES NFR BLD AUTO: 10 % (ref 19.6–45.3)
LYMPHOCYTES NFR BLD AUTO: 10.3 % (ref 19.6–45.3)
LYMPHOCYTES NFR BLD AUTO: 11 % (ref 19.6–45.3)
LYMPHOCYTES NFR BLD AUTO: 11 % (ref 19.6–45.3)
LYMPHOCYTES NFR BLD AUTO: 11.5 % (ref 19.6–45.3)
LYMPHOCYTES NFR BLD AUTO: 12.1 % (ref 19.6–45.3)
LYMPHOCYTES NFR BLD AUTO: 13.7 % (ref 19.6–45.3)
LYMPHOCYTES NFR BLD AUTO: 3.7 % (ref 19.6–45.3)
LYMPHOCYTES NFR BLD AUTO: 8.9 % (ref 19.6–45.3)
LYMPHOCYTES NFR BLD AUTO: 9.2 % (ref 19.6–45.3)
LYMPHOCYTES NFR BLD MANUAL: 3 % (ref 5–12)
LYMPHOCYTES NFR BLD MANUAL: 8 % (ref 19.6–45.3)
LYMPHOCYTES NFR FLD MANUAL: 88 %
MACROCYTES BLD QL SMEAR: ABNORMAL
MAGNESIUM SERPL-MCNC: 1.9 MG/DL (ref 1.6–2.4)
MCH RBC QN AUTO: 31 PG (ref 26.6–33)
MCH RBC QN AUTO: 31.1 PG (ref 26.6–33)
MCH RBC QN AUTO: 31.1 PG (ref 26.6–33)
MCH RBC QN AUTO: 31.3 PG (ref 26.6–33)
MCH RBC QN AUTO: 31.4 PG (ref 26.6–33)
MCH RBC QN AUTO: 31.5 PG (ref 26.6–33)
MCH RBC QN AUTO: 31.5 PG (ref 26.6–33)
MCH RBC QN AUTO: 31.8 PG (ref 26.6–33)
MCH RBC QN AUTO: 32.3 PG (ref 26.6–33)
MCH RBC QN AUTO: 32.3 PG (ref 26.6–33)
MCH RBC QN AUTO: 33.4 PG (ref 26.6–33)
MCH RBC QN AUTO: 33.5 PG (ref 26.6–33)
MCH RBC QN AUTO: 34.1 PG (ref 26.6–33)
MCHC RBC AUTO-ENTMCNC: 31.2 G/DL (ref 31.5–35.7)
MCHC RBC AUTO-ENTMCNC: 31.3 G/DL (ref 31.5–35.7)
MCHC RBC AUTO-ENTMCNC: 31.5 G/DL (ref 31.5–35.7)
MCHC RBC AUTO-ENTMCNC: 31.6 G/DL (ref 31.5–35.7)
MCHC RBC AUTO-ENTMCNC: 32 G/DL (ref 31.5–35.7)
MCHC RBC AUTO-ENTMCNC: 32.2 G/DL (ref 31.5–35.7)
MCHC RBC AUTO-ENTMCNC: 32.3 G/DL (ref 31.5–35.7)
MCHC RBC AUTO-ENTMCNC: 32.4 G/DL (ref 31.5–35.7)
MCHC RBC AUTO-ENTMCNC: 32.9 G/DL (ref 31.5–35.7)
MCHC RBC AUTO-ENTMCNC: 32.9 G/DL (ref 31.5–35.7)
MCHC RBC AUTO-ENTMCNC: 33.5 G/DL (ref 31.5–35.7)
MCV RBC AUTO: 100 FL (ref 79–97)
MCV RBC AUTO: 100 FL (ref 79–97)
MCV RBC AUTO: 100.1 FL (ref 79–97)
MCV RBC AUTO: 100.5 FL (ref 79–97)
MCV RBC AUTO: 100.6 FL (ref 79–97)
MCV RBC AUTO: 101.3 FL (ref 79–97)
MCV RBC AUTO: 105.8 FL (ref 79–97)
MCV RBC AUTO: 95.8 FL (ref 79–97)
MCV RBC AUTO: 96.9 FL (ref 79–97)
MCV RBC AUTO: 97.4 FL (ref 79–97)
MCV RBC AUTO: 98.1 FL (ref 79–97)
MCV RBC AUTO: 98.2 FL (ref 79–97)
MCV RBC AUTO: 98.5 FL (ref 79–97)
MCV RBC AUTO: 98.9 FL (ref 79–97)
MCV RBC AUTO: 99.1 FL (ref 79–97)
MCV RBC AUTO: 99.4 FL (ref 79–97)
MCV RBC AUTO: 99.8 FL (ref 79–97)
MESOTHL CELL NFR FLD MANUAL: 5 %
METAMYELOCYTES NFR BLD MANUAL: 1 % (ref 0–0)
METHGB BLD QL: 0.5 % (ref 0–1.5)
MICROALBUMIN/CREAT UR: 34 MG/G
MODALITY: ABNORMAL
MONOCYTES # BLD AUTO: 0.18 10*3/MM3 (ref 0.1–0.9)
MONOCYTES # BLD AUTO: 0.5 10*3/MM3 (ref 0.1–0.9)
MONOCYTES # BLD AUTO: 0.5 10*3/MM3 (ref 0.1–0.9)
MONOCYTES # BLD AUTO: 0.68 10*3/MM3 (ref 0.1–0.9)
MONOCYTES # BLD AUTO: 0.7 10*3/MM3 (ref 0.1–0.9)
MONOCYTES # BLD AUTO: 0.7 10*3/MM3 (ref 0.1–0.9)
MONOCYTES # BLD AUTO: 0.88 10*3/MM3 (ref 0.1–0.9)
MONOCYTES # BLD AUTO: 0.9 10*3/MM3 (ref 0.1–0.9)
MONOCYTES # BLD AUTO: 0.92 10*3/MM3 (ref 0.1–0.9)
MONOCYTES # BLD AUTO: 1.07 10*3/MM3 (ref 0.1–0.9)
MONOCYTES # BLD AUTO: 1.81 10*3/MM3 (ref 0.1–0.9)
MONOCYTES NFR BLD AUTO: 1.5 % (ref 5–12)
MONOCYTES NFR BLD AUTO: 2.1 % (ref 5–12)
MONOCYTES NFR BLD AUTO: 2.5 % (ref 5–12)
MONOCYTES NFR BLD AUTO: 4.3 % (ref 5–12)
MONOCYTES NFR BLD AUTO: 5.8 % (ref 5–12)
MONOCYTES NFR BLD AUTO: 5.9 % (ref 5–12)
MONOCYTES NFR BLD AUTO: 6.6 % (ref 5–12)
MONOCYTES NFR BLD AUTO: 6.8 % (ref 5–12)
MONOCYTES NFR BLD AUTO: 7.8 % (ref 5–12)
MONOCYTES NFR BLD AUTO: 9.5 % (ref 5–12)
MONOCYTES NFR FLD: 2 %
NEUTROPHILS # BLD AUTO: 11.03 10*3/MM3 (ref 1.7–7)
NEUTROPHILS # BLD AUTO: 17 10*3/MM3 (ref 1.7–7)
NEUTROPHILS # BLD AUTO: 17.8 10*3/MM3 (ref 1.7–7)
NEUTROPHILS # BLD AUTO: 19.4 10*3/MM3 (ref 1.7–7)
NEUTROPHILS # BLD AUTO: 21.08 10*3/MM3 (ref 1.7–7)
NEUTROPHILS # BLD AUTO: 26.67 10*3/MM3 (ref 1.7–7)
NEUTROPHILS # BLD AUTO: 7.91 10*3/MM3 (ref 1.7–7)
NEUTROPHILS # BLD AUTO: 8.8 10*3/MM3 (ref 1.7–7)
NEUTROPHILS # BLD AUTO: 9.11 10*3/MM3 (ref 1.7–7)
NEUTROPHILS # BLD AUTO: 9.2 10*3/MM3 (ref 1.7–7)
NEUTROPHILS # BLD AUTO: 9.59 10*3/MM3 (ref 1.7–7)
NEUTROPHILS NFR BLD AUTO: 76.3 % (ref 42.7–76)
NEUTROPHILS NFR BLD AUTO: 77.9 % (ref 42.7–76)
NEUTROPHILS NFR BLD AUTO: 78.5 % (ref 42.7–76)
NEUTROPHILS NFR BLD AUTO: 80.5 % (ref 42.7–76)
NEUTROPHILS NFR BLD AUTO: 80.8 % (ref 42.7–76)
NEUTROPHILS NFR BLD AUTO: 81 % (ref 42.7–76)
NEUTROPHILS NFR BLD AUTO: 85.4 % (ref 42.7–76)
NEUTROPHILS NFR BLD AUTO: 86.5 % (ref 42.7–76)
NEUTROPHILS NFR BLD AUTO: 87.9 % (ref 42.7–76)
NEUTROPHILS NFR BLD AUTO: 93.8 % (ref 42.7–76)
NEUTROPHILS NFR BLD MANUAL: 67 % (ref 42.7–76)
NEUTROPHILS NFR FLD MANUAL: 5 %
NEUTS BAND NFR BLD MANUAL: 20 % (ref 0–5)
NITRITE UR QL STRIP: NEGATIVE
NOTE: ABNORMAL
NRBC BLD AUTO-RTO: 0 /100 WBC (ref 0–0.2)
NRBC BLD AUTO-RTO: 0.2 /100 WBC (ref 0–0.2)
NT-PROBNP SERPL-MCNC: 207.7 PG/ML (ref 5–900)
NT-PROBNP SERPL-MCNC: 68.9 PG/ML (ref 5–900)
OXYHGB MFR BLDV: 93.5 % (ref 94–99)
PATH REPORT.FINAL DX SPEC: NORMAL
PATH REPORT.GROSS SPEC: NORMAL
PAW @ PEAK INSP FLOW SETTING VENT: 0 CMH2O
PCO2 BLDA: 42.9 MM HG
PCO2 TEMP ADJ BLD: 42.9 MM HG (ref 35–48)
PH BLDA: 7.41 PH UNITS (ref 7.35–7.45)
PH FLD: 7.71 [PH]
PH UR STRIP.AUTO: <=5 [PH] (ref 5–8)
PH, TEMP CORRECTED: 7.41 PH UNITS
PHOSPHATE SERPL-MCNC: 3.7 MG/DL (ref 2.5–4.5)
PLAT MORPH BLD: NORMAL
PLATELET # BLD AUTO: 105 10*3/MM3 (ref 140–450)
PLATELET # BLD AUTO: 113 10*3/MM3 (ref 140–450)
PLATELET # BLD AUTO: 115 10*3/MM3 (ref 140–450)
PLATELET # BLD AUTO: 128 10*3/MM3 (ref 140–450)
PLATELET # BLD AUTO: 161 10*3/MM3 (ref 140–450)
PLATELET # BLD AUTO: 218 10*3/MM3 (ref 140–450)
PLATELET # BLD AUTO: 252 10*3/MM3 (ref 140–450)
PLATELET # BLD AUTO: 255 10*3/MM3 (ref 140–450)
PLATELET # BLD AUTO: 262 10*3/MM3 (ref 140–450)
PLATELET # BLD AUTO: 263 10*3/MM3 (ref 140–450)
PLATELET # BLD AUTO: 265 10*3/MM3 (ref 140–450)
PLATELET # BLD AUTO: 269 10*3/MM3 (ref 140–450)
PLATELET # BLD AUTO: 269 10*3/MM3 (ref 140–450)
PLATELET # BLD AUTO: 271 10*3/MM3 (ref 140–450)
PLATELET # BLD AUTO: 281 10*3/MM3 (ref 140–450)
PLATELET # BLD AUTO: 294 10*3/MM3 (ref 140–450)
PLATELET # BLD AUTO: 304 10*3/MM3 (ref 140–450)
PMV BLD AUTO: 11 FL (ref 6–12)
PMV BLD AUTO: 11.2 FL (ref 6–12)
PMV BLD AUTO: 11.4 FL (ref 6–12)
PMV BLD AUTO: 11.5 FL (ref 6–12)
PMV BLD AUTO: 11.6 FL (ref 6–12)
PMV BLD AUTO: 11.7 FL (ref 6–12)
PMV BLD AUTO: 11.7 FL (ref 6–12)
PMV BLD AUTO: 11.9 FL (ref 6–12)
PMV BLD AUTO: 12.1 FL (ref 6–12)
PMV BLD AUTO: 12.1 FL (ref 6–12)
PMV BLD AUTO: 12.3 FL (ref 6–12)
PMV BLD AUTO: 8.3 FL (ref 6–12)
PMV BLD AUTO: 8.4 FL (ref 6–12)
PMV BLD AUTO: 8.6 FL (ref 6–12)
PMV BLD AUTO: 9.1 FL (ref 6–12)
PO2 BLDA: 77.9 MM HG (ref 83–108)
PO2 TEMP ADJ BLD: 77.9 MM HG (ref 83–108)
POLYCHROMASIA BLD QL SMEAR: ABNORMAL
POTASSIUM BLD-SCNC: 3.9 MMOL/L (ref 3.5–5.2)
POTASSIUM BLD-SCNC: 3.9 MMOL/L (ref 3.5–5.2)
POTASSIUM BLD-SCNC: 4 MMOL/L (ref 3.5–5.2)
POTASSIUM BLD-SCNC: 4.1 MMOL/L (ref 3.5–5.2)
POTASSIUM BLD-SCNC: 4.2 MMOL/L (ref 3.5–5.2)
POTASSIUM BLD-SCNC: 4.2 MMOL/L (ref 3.5–5.2)
POTASSIUM BLD-SCNC: 4.4 MMOL/L (ref 3.5–5.2)
POTASSIUM BLD-SCNC: 4.4 MMOL/L (ref 3.5–5.2)
POTASSIUM BLD-SCNC: 4.6 MMOL/L (ref 3.5–5.2)
POTASSIUM BLD-SCNC: 4.7 MMOL/L (ref 3.5–5.2)
POTASSIUM BLD-SCNC: 5 MMOL/L (ref 3.5–5.2)
POTASSIUM BLD-SCNC: 5.1 MMOL/L (ref 3.5–5.2)
POTASSIUM BLD-SCNC: 5.2 MMOL/L (ref 3.5–5.2)
POTASSIUM BLD-SCNC: 5.3 MMOL/L (ref 3.5–5.2)
POTASSIUM BLD-SCNC: 5.4 MMOL/L (ref 3.5–5.2)
POTASSIUM BLD-SCNC: 5.6 MMOL/L (ref 3.5–5.2)
POTASSIUM BLD-SCNC: 6 MMOL/L (ref 3.5–5.2)
POTASSIUM BLDA-SCNC: 3.9 MMOL/L (ref 3.5–4.9)
POTASSIUM BLDA-SCNC: 5 MMOL/L (ref 3.5–4.9)
PROT FLD-MCNC: 4.3 G/DL
PROT SERPL-MCNC: 6.4 G/DL (ref 6–8.5)
PROT SERPL-MCNC: 6.5 G/DL (ref 6–8.5)
PROT SERPL-MCNC: 7 G/DL (ref 6–8.5)
PROT SERPL-MCNC: 7.3 G/DL (ref 6–8.5)
PROT SERPL-MCNC: 7.3 G/DL (ref 6–8.5)
PROT SERPL-MCNC: 7.4 G/DL (ref 6–8.5)
PROT SERPL-MCNC: 7.4 G/DL (ref 6–8.5)
PROT SERPL-MCNC: 7.6 G/DL (ref 6–8.5)
PROT SERPL-MCNC: 8.1 G/DL (ref 6–8.5)
PROT SERPL-MCNC: 8.1 G/DL (ref 6–8.5)
PROT UR QL STRIP: ABNORMAL
PROTHROMBIN TIME: 13.3 SECONDS (ref 11.2–14.3)
PROTHROMBIN TIME: 13.6 SECONDS (ref 11.2–14.3)
PROTHROMBIN TIME: 14.8 SECONDS (ref 12.8–15.2)
RBC # BLD AUTO: 2.2 10*6/MM3 (ref 4.14–5.8)
RBC # BLD AUTO: 2.57 10*6/MM3 (ref 4.14–5.8)
RBC # BLD AUTO: 2.58 10*6/MM3 (ref 4.14–5.8)
RBC # BLD AUTO: 3.22 10*6/MM3 (ref 4.14–5.8)
RBC # BLD AUTO: 3.33 10*6/MM3 (ref 4.14–5.8)
RBC # BLD AUTO: 3.37 10*6/MM3 (ref 4.14–5.8)
RBC # BLD AUTO: 3.63 10*6/MM3 (ref 4.14–5.8)
RBC # BLD AUTO: 3.81 10*6/MM3 (ref 4.14–5.8)
RBC # BLD AUTO: 4.17 10*6/MM3 (ref 4.14–5.8)
RBC # BLD AUTO: 4.19 10*6/MM3 (ref 4.14–5.8)
RBC # BLD AUTO: 4.28 10*6/MM3 (ref 4.14–5.8)
RBC # BLD AUTO: 4.52 10*6/MM3 (ref 4.14–5.8)
RBC # BLD AUTO: 4.55 10*6/MM3 (ref 4.14–5.8)
RBC # BLD AUTO: 4.73 10*6/MM3 (ref 4.14–5.8)
RBC # BLD AUTO: 4.8 10*6/MM3 (ref 4.14–5.8)
RBC # BLD AUTO: 4.83 10*6/MM3 (ref 4.14–5.8)
RBC # BLD AUTO: 4.96 10*6/MM3 (ref 4.14–5.8)
RBC # FLD AUTO: 8000 /MM3
RH BLD: POSITIVE
SODIUM BLD-SCNC: 133 MMOL/L (ref 136–145)
SODIUM BLD-SCNC: 134 MMOL/L (ref 136–145)
SODIUM BLD-SCNC: 135 MMOL/L (ref 136–145)
SODIUM BLD-SCNC: 137 MMOL/L (ref 136–145)
SODIUM BLD-SCNC: 137 MMOL/L (ref 136–145)
SODIUM BLD-SCNC: 138 MMOL/L (ref 136–145)
SODIUM BLD-SCNC: 139 MMOL/L (ref 136–145)
SODIUM BLD-SCNC: 139 MMOL/L (ref 136–145)
SODIUM BLD-SCNC: 140 MMOL/L (ref 136–145)
SODIUM BLD-SCNC: 141 MMOL/L (ref 136–145)
SODIUM BLD-SCNC: 142 MMOL/L (ref 136–145)
SODIUM BLD-SCNC: 144 MMOL/L (ref 136–145)
SODIUM BLDA-SCNC: 140 MMOL/L (ref 138–146)
SODIUM BLDA-SCNC: 141 MMOL/L (ref 138–146)
SP GR UR STRIP: 1.04 (ref 1–1.03)
T&S EXPIRATION DATE: NORMAL
T&S EXPIRATION DATE: NORMAL
T3FREE SERPL-MCNC: 2.14 PG/ML (ref 2–4.4)
T3FREE SERPL-MCNC: 2.42 PG/ML (ref 2–4.4)
T4 FREE SERPL-MCNC: 1.08 NG/DL (ref 0.93–1.7)
T4 FREE SERPL-MCNC: 1.17 NG/DL (ref 0.93–1.7)
TEST INFORMATION: NORMAL
TOTAL RATE: 0 BREATHS/MINUTE
TRIGL SERPL-MCNC: 172 MG/DL (ref 0–150)
TRIGL SERPL-MCNC: 252 MG/DL (ref 0–150)
TRIGL SERPL-MCNC: 263 MG/DL (ref 0–150)
TROPONIN T SERPL-MCNC: 0.21 NG/ML (ref 0–0.03)
TROPONIN T SERPL-MCNC: <0.01 NG/ML (ref 0–0.03)
TROPONIN T SERPL-MCNC: <0.01 NG/ML (ref 0–0.03)
TSH SERPL DL<=0.05 MIU/L-ACNC: 2.11 UIU/ML (ref 0.27–4.2)
TSH SERPL DL<=0.05 MIU/L-ACNC: 2.12 UIU/ML (ref 0.27–4.2)
TSH SERPL DL<=0.05 MIU/L-ACNC: 3.32 UIU/ML (ref 0.27–4.2)
UROBILINOGEN UR QL STRIP: ABNORMAL
VARIANT LYMPHS NFR BLD MANUAL: 1 % (ref 0–5)
VIT B12 BLD-MCNC: 795 PG/ML (ref 211–946)
VIT B12 BLD-MCNC: >2000 PG/ML (ref 211–946)
VLDLC SERPL-MCNC: 34.4 MG/DL
VLDLC SERPL-MCNC: 50.4 MG/DL
VLDLC SERPL-MCNC: 52.6 MG/DL
WBC # FLD AUTO: 2032 /MM3
WBC MORPH BLD: NORMAL
WBC NRBC COR # BLD: 10.07 10*3/MM3 (ref 3.4–10.8)
WBC NRBC COR # BLD: 10.25 10*3/MM3 (ref 3.4–10.8)
WBC NRBC COR # BLD: 10.82 10*3/MM3 (ref 3.4–10.8)
WBC NRBC COR # BLD: 11.28 10*3/MM3 (ref 3.4–10.8)
WBC NRBC COR # BLD: 11.3 10*3/MM3 (ref 3.4–10.8)
WBC NRBC COR # BLD: 11.4 10*3/MM3 (ref 3.4–10.8)
WBC NRBC COR # BLD: 11.76 10*3/MM3 (ref 3.4–10.8)
WBC NRBC COR # BLD: 11.85 10*3/MM3 (ref 3.4–10.8)
WBC NRBC COR # BLD: 11.91 10*3/MM3 (ref 3.4–10.8)
WBC NRBC COR # BLD: 14.29 10*3/MM3 (ref 3.4–10.8)
WBC NRBC COR # BLD: 19.7 10*3/MM3 (ref 3.4–10.8)
WBC NRBC COR # BLD: 20.8 10*3/MM3 (ref 3.4–10.8)
WBC NRBC COR # BLD: 22.1 10*3/MM3 (ref 3.4–10.8)
WBC NRBC COR # BLD: 27.61 10*3/MM3 (ref 3.4–10.8)
WBC NRBC COR # BLD: 30.65 10*3/MM3 (ref 3.4–10.8)
WBC NRBC COR # BLD: 35.95 10*3/MM3 (ref 3.4–10.8)
WBC NRBC COR # BLD: 4.97 10*3/MM3 (ref 3.4–10.8)
WHOLE BLOOD HOLD SPECIMEN: NORMAL

## 2019-01-01 PROCEDURE — 94729 DIFFUSING CAPACITY: CPT | Performed by: INTERNAL MEDICINE

## 2019-01-01 PROCEDURE — 25010000002 HYDROMORPHONE PER 4 MG: Performed by: NURSE PRACTITIONER

## 2019-01-01 PROCEDURE — 84443 ASSAY THYROID STIM HORMONE: CPT | Performed by: INTERNAL MEDICINE

## 2019-01-01 PROCEDURE — 71045 X-RAY EXAM CHEST 1 VIEW: CPT

## 2019-01-01 PROCEDURE — 25010000002 ALTEPLASE PER 1 MG: Performed by: EMERGENCY MEDICINE

## 2019-01-01 PROCEDURE — 85027 COMPLETE CBC AUTOMATED: CPT | Performed by: INTERNAL MEDICINE

## 2019-01-01 PROCEDURE — 82945 GLUCOSE OTHER FLUID: CPT | Performed by: INTERNAL MEDICINE

## 2019-01-01 PROCEDURE — 63710000001 INSULIN DETEMIR PER 5 UNITS: Performed by: INTERNAL MEDICINE

## 2019-01-01 PROCEDURE — 96372 THER/PROPH/DIAG INJ SC/IM: CPT

## 2019-01-01 PROCEDURE — 80061 LIPID PANEL: CPT | Performed by: NURSE PRACTITIONER

## 2019-01-01 PROCEDURE — 82962 GLUCOSE BLOOD TEST: CPT

## 2019-01-01 PROCEDURE — 99222 1ST HOSP IP/OBS MODERATE 55: CPT | Performed by: INTERNAL MEDICINE

## 2019-01-01 PROCEDURE — 71046 X-RAY EXAM CHEST 2 VIEWS: CPT

## 2019-01-01 PROCEDURE — 99238 HOSP IP/OBS DSCHRG MGMT 30/<: CPT | Performed by: NURSE PRACTITIONER

## 2019-01-01 PROCEDURE — 99024 POSTOP FOLLOW-UP VISIT: CPT | Performed by: THORACIC SURGERY (CARDIOTHORACIC VASCULAR SURGERY)

## 2019-01-01 PROCEDURE — 63710000001 PROCHLORPERAZINE MALEATE PER 10 MG: Performed by: INTERNAL MEDICINE

## 2019-01-01 PROCEDURE — 25010000002 ETOPOSIDE 500 MG/25ML SOLUTION 25 ML VIAL: Performed by: INTERNAL MEDICINE

## 2019-01-01 PROCEDURE — 25010000002 IOPAMIDOL 61 % SOLUTION: Performed by: INTERNAL MEDICINE

## 2019-01-01 PROCEDURE — 96377 APPLICATON ON-BODY INJECTOR: CPT

## 2019-01-01 PROCEDURE — 88304 TISSUE EXAM BY PATHOLOGIST: CPT | Performed by: INTERNAL MEDICINE

## 2019-01-01 PROCEDURE — 0W9930Z DRAINAGE OF RIGHT PLEURAL CAVITY WITH DRAINAGE DEVICE, PERCUTANEOUS APPROACH: ICD-10-PCS | Performed by: THORACIC SURGERY (CARDIOTHORACIC VASCULAR SURGERY)

## 2019-01-01 PROCEDURE — 25010000002 ONDANSETRON PER 1 MG: Performed by: INTERNAL MEDICINE

## 2019-01-01 PROCEDURE — 80053 COMPREHEN METABOLIC PANEL: CPT | Performed by: NEUROLOGICAL SURGERY

## 2019-01-01 PROCEDURE — 86803 HEPATITIS C AB TEST: CPT | Performed by: INTERNAL MEDICINE

## 2019-01-01 PROCEDURE — 93005 ELECTROCARDIOGRAM TRACING: CPT | Performed by: EMERGENCY MEDICINE

## 2019-01-01 PROCEDURE — 85610 PROTHROMBIN TIME: CPT | Performed by: NURSE PRACTITIONER

## 2019-01-01 PROCEDURE — 93306 TTE W/DOPPLER COMPLETE: CPT | Performed by: INTERNAL MEDICINE

## 2019-01-01 PROCEDURE — 83615 LACTATE (LD) (LDH) ENZYME: CPT | Performed by: INTERNAL MEDICINE

## 2019-01-01 PROCEDURE — 25010000002 PALONOSETRON 0.25 MG/5ML SOLUTION PREFILLED SYRINGE: Performed by: INTERNAL MEDICINE

## 2019-01-01 PROCEDURE — C1729 CATH, DRAINAGE: HCPCS

## 2019-01-01 PROCEDURE — 86900 BLOOD TYPING SEROLOGIC ABO: CPT | Performed by: NURSE PRACTITIONER

## 2019-01-01 PROCEDURE — 83036 HEMOGLOBIN GLYCOSYLATED A1C: CPT | Performed by: INTERNAL MEDICINE

## 2019-01-01 PROCEDURE — C1769 GUIDE WIRE: HCPCS | Performed by: NEUROLOGICAL SURGERY

## 2019-01-01 PROCEDURE — 99291 CRITICAL CARE FIRST HOUR: CPT | Performed by: INTERNAL MEDICINE

## 2019-01-01 PROCEDURE — 25010000002 PEGFILGRASTIM 6 MG/0.6ML PREFILLED SYRINGE KIT: Performed by: INTERNAL MEDICINE

## 2019-01-01 PROCEDURE — 87075 CULTR BACTERIA EXCEPT BLOOD: CPT | Performed by: INTERNAL MEDICINE

## 2019-01-01 PROCEDURE — 25010000002 ATEZOLIZUMAB 1200 MG/20ML SOLUTION 20 ML VIAL: Performed by: INTERNAL MEDICINE

## 2019-01-01 PROCEDURE — 96417 CHEMO IV INFUS EACH ADDL SEQ: CPT

## 2019-01-01 PROCEDURE — 36415 COLL VENOUS BLD VENIPUNCTURE: CPT | Performed by: INTERNAL MEDICINE

## 2019-01-01 PROCEDURE — 96375 TX/PRO/DX INJ NEW DRUG ADDON: CPT

## 2019-01-01 PROCEDURE — 25010000002 CARBOPLATIN PER 50 MG: Performed by: INTERNAL MEDICINE

## 2019-01-01 PROCEDURE — 80053 COMPREHEN METABOLIC PANEL: CPT | Performed by: INTERNAL MEDICINE

## 2019-01-01 PROCEDURE — 82607 VITAMIN B-12: CPT | Performed by: INTERNAL MEDICINE

## 2019-01-01 PROCEDURE — 94060 EVALUATION OF WHEEZING: CPT | Performed by: INTERNAL MEDICINE

## 2019-01-01 PROCEDURE — 0JH60WZ INSERTION OF TOTALLY IMPLANTABLE VASCULAR ACCESS DEVICE INTO CHEST SUBCUTANEOUS TISSUE AND FASCIA, OPEN APPROACH: ICD-10-PCS | Performed by: THORACIC SURGERY (CARDIOTHORACIC VASCULAR SURGERY)

## 2019-01-01 PROCEDURE — 85379 FIBRIN DEGRADATION QUANT: CPT | Performed by: NURSE PRACTITIONER

## 2019-01-01 PROCEDURE — 96413 CHEMO IV INFUSION 1 HR: CPT

## 2019-01-01 PROCEDURE — 25010000002 ENOXAPARIN PER 10 MG: Performed by: HOSPITALIST

## 2019-01-01 PROCEDURE — 25010000002 ONDANSETRON PER 1 MG: Performed by: EMERGENCY MEDICINE

## 2019-01-01 PROCEDURE — 92610 EVALUATE SWALLOWING FUNCTION: CPT

## 2019-01-01 PROCEDURE — 88341 IMHCHEM/IMCYTCHM EA ADD ANTB: CPT | Performed by: INTERNAL MEDICINE

## 2019-01-01 PROCEDURE — 86901 BLOOD TYPING SEROLOGIC RH(D): CPT

## 2019-01-01 PROCEDURE — 85027 COMPLETE CBC AUTOMATED: CPT | Performed by: NEUROLOGICAL SURGERY

## 2019-01-01 PROCEDURE — C1760 CLOSURE DEV, VASC: HCPCS | Performed by: NEUROLOGICAL SURGERY

## 2019-01-01 PROCEDURE — 93306 TTE W/DOPPLER COMPLETE: CPT

## 2019-01-01 PROCEDURE — 82105 ALPHA-FETOPROTEIN SERUM: CPT | Performed by: INTERNAL MEDICINE

## 2019-01-01 PROCEDURE — 25010000002 FOSAPREPITANT PER 1 MG: Performed by: INTERNAL MEDICINE

## 2019-01-01 PROCEDURE — 88172 CYTP DX EVAL FNA 1ST EA SITE: CPT | Performed by: INTERNAL MEDICINE

## 2019-01-01 PROCEDURE — 96367 TX/PROPH/DG ADDL SEQ IV INF: CPT

## 2019-01-01 PROCEDURE — 85007 BL SMEAR W/DIFF WBC COUNT: CPT | Performed by: INTERNAL MEDICINE

## 2019-01-01 PROCEDURE — 99232 SBSQ HOSP IP/OBS MODERATE 35: CPT | Performed by: INTERNAL MEDICINE

## 2019-01-01 PROCEDURE — 89051 BODY FLUID CELL COUNT: CPT | Performed by: INTERNAL MEDICINE

## 2019-01-01 PROCEDURE — 99233 SBSQ HOSP IP/OBS HIGH 50: CPT | Performed by: INTERNAL MEDICINE

## 2019-01-01 PROCEDURE — B317YZZ FLUOROSCOPY OF LEFT INTERNAL CAROTID ARTERY USING OTHER CONTRAST: ICD-10-PCS | Performed by: NEUROLOGICAL SURGERY

## 2019-01-01 PROCEDURE — 70496 CT ANGIOGRAPHY HEAD: CPT

## 2019-01-01 PROCEDURE — 97116 GAIT TRAINING THERAPY: CPT

## 2019-01-01 PROCEDURE — 93880 EXTRACRANIAL BILAT STUDY: CPT | Performed by: INTERNAL MEDICINE

## 2019-01-01 PROCEDURE — 86900 BLOOD TYPING SEROLOGIC ABO: CPT

## 2019-01-01 PROCEDURE — 99239 HOSP IP/OBS DSCHRG MGMT >30: CPT | Performed by: HOSPITALIST

## 2019-01-01 PROCEDURE — 94726 PLETHYSMOGRAPHY LUNG VOLUMES: CPT | Performed by: INTERNAL MEDICINE

## 2019-01-01 PROCEDURE — 0 IOPAMIDOL PER 1 ML: Performed by: EMERGENCY MEDICINE

## 2019-01-01 PROCEDURE — 85025 COMPLETE CBC W/AUTO DIFF WBC: CPT | Performed by: NURSE PRACTITIONER

## 2019-01-01 PROCEDURE — 99231 SBSQ HOSP IP/OBS SF/LOW 25: CPT | Performed by: THORACIC SURGERY (CARDIOTHORACIC VASCULAR SURGERY)

## 2019-01-01 PROCEDURE — 25010000002 HYDRALAZINE PER 20 MG: Performed by: NURSE PRACTITIONER

## 2019-01-01 PROCEDURE — 97161 PT EVAL LOW COMPLEX 20 MIN: CPT

## 2019-01-01 PROCEDURE — 0 TECHNETIUM MEDRONATE KIT: Performed by: HOSPITALIST

## 2019-01-01 PROCEDURE — 99223 1ST HOSP IP/OBS HIGH 75: CPT | Performed by: INTERNAL MEDICINE

## 2019-01-01 PROCEDURE — 25010000002 FENTANYL CITRATE (PF) 100 MCG/2ML SOLUTION: Performed by: NURSE ANESTHETIST, CERTIFIED REGISTERED

## 2019-01-01 PROCEDURE — 86923 COMPATIBILITY TEST ELECTRIC: CPT

## 2019-01-01 PROCEDURE — C1726 CATH, BAL DIL, NON-VASCULAR: HCPCS | Performed by: INTERNAL MEDICINE

## 2019-01-01 PROCEDURE — 82043 UR ALBUMIN QUANTITATIVE: CPT | Performed by: INTERNAL MEDICINE

## 2019-01-01 PROCEDURE — 88173 CYTOPATH EVAL FNA REPORT: CPT | Performed by: INTERNAL MEDICINE

## 2019-01-01 PROCEDURE — 74177 CT ABD & PELVIS W/CONTRAST: CPT

## 2019-01-01 PROCEDURE — 96415 CHEMO IV INFUSION ADDL HR: CPT

## 2019-01-01 PROCEDURE — 25010000002 DEXAMETHASONE PER 1 MG: Performed by: INTERNAL MEDICINE

## 2019-01-01 PROCEDURE — 80061 LIPID PANEL: CPT | Performed by: NEUROLOGICAL SURGERY

## 2019-01-01 PROCEDURE — 99291 CRITICAL CARE FIRST HOUR: CPT

## 2019-01-01 PROCEDURE — 32550 INSERT PLEURAL CATH: CPT | Performed by: THORACIC SURGERY (CARDIOTHORACIC VASCULAR SURGERY)

## 2019-01-01 PROCEDURE — 80053 COMPREHEN METABOLIC PANEL: CPT | Performed by: EMERGENCY MEDICINE

## 2019-01-01 PROCEDURE — 85025 COMPLETE CBC W/AUTO DIFF WBC: CPT | Performed by: INTERNAL MEDICINE

## 2019-01-01 PROCEDURE — 80048 BASIC METABOLIC PNL TOTAL CA: CPT | Performed by: NURSE PRACTITIONER

## 2019-01-01 PROCEDURE — 07D78ZX EXTRACTION OF THORAX LYMPHATIC, VIA NATURAL OR ARTIFICIAL OPENING ENDOSCOPIC, DIAGNOSTIC: ICD-10-PCS | Performed by: INTERNAL MEDICINE

## 2019-01-01 PROCEDURE — 61645 PERQ ART M-THROMBECT &/NFS: CPT | Performed by: NEUROLOGICAL SURGERY

## 2019-01-01 PROCEDURE — 36561 INSERT TUNNELED CV CATH: CPT | Performed by: THORACIC SURGERY (CARDIOTHORACIC VASCULAR SURGERY)

## 2019-01-01 PROCEDURE — 84157 ASSAY OF PROTEIN OTHER: CPT | Performed by: INTERNAL MEDICINE

## 2019-01-01 PROCEDURE — 93970 EXTREMITY STUDY: CPT | Performed by: INTERNAL MEDICINE

## 2019-01-01 PROCEDURE — 85014 HEMATOCRIT: CPT

## 2019-01-01 PROCEDURE — 84481 FREE ASSAY (FT-3): CPT | Performed by: INTERNAL MEDICINE

## 2019-01-01 PROCEDURE — 85610 PROTHROMBIN TIME: CPT

## 2019-01-01 PROCEDURE — 25010000002 ETOPOSIDE 1 GM/50ML SOLUTION 50 ML VIAL: Performed by: INTERNAL MEDICINE

## 2019-01-01 PROCEDURE — 99233 SBSQ HOSP IP/OBS HIGH 50: CPT | Performed by: NURSE PRACTITIONER

## 2019-01-01 PROCEDURE — 63710000001 INSULIN DETEMIR PER 5 UNITS: Performed by: HOSPITALIST

## 2019-01-01 PROCEDURE — 93970 EXTREMITY STUDY: CPT

## 2019-01-01 PROCEDURE — 93000 ELECTROCARDIOGRAM COMPLETE: CPT | Performed by: INTERNAL MEDICINE

## 2019-01-01 PROCEDURE — 80074 ACUTE HEPATITIS PANEL: CPT | Performed by: NURSE PRACTITIONER

## 2019-01-01 PROCEDURE — 80047 BASIC METABLC PNL IONIZED CA: CPT

## 2019-01-01 PROCEDURE — 99232 SBSQ HOSP IP/OBS MODERATE 35: CPT | Performed by: PHYSICIAN ASSISTANT

## 2019-01-01 PROCEDURE — 25010000002 LORAZEPAM PER 2 MG: Performed by: NURSE PRACTITIONER

## 2019-01-01 PROCEDURE — 86900 BLOOD TYPING SEROLOGIC ABO: CPT | Performed by: PHYSICIAN ASSISTANT

## 2019-01-01 PROCEDURE — 85730 THROMBOPLASTIN TIME PARTIAL: CPT | Performed by: EMERGENCY MEDICINE

## 2019-01-01 PROCEDURE — 83880 ASSAY OF NATRIURETIC PEPTIDE: CPT | Performed by: NURSE PRACTITIONER

## 2019-01-01 PROCEDURE — 96366 THER/PROPH/DIAG IV INF ADDON: CPT

## 2019-01-01 PROCEDURE — 80048 BASIC METABOLIC PNL TOTAL CA: CPT | Performed by: HOSPITALIST

## 2019-01-01 PROCEDURE — C1887 CATHETER, GUIDING: HCPCS | Performed by: NEUROLOGICAL SURGERY

## 2019-01-01 PROCEDURE — 99215 OFFICE O/P EST HI 40 MIN: CPT | Performed by: INTERNAL MEDICINE

## 2019-01-01 PROCEDURE — 3E05317 INTRODUCTION OF OTHER THROMBOLYTIC INTO PERIPHERAL ARTERY, PERCUTANEOUS APPROACH: ICD-10-PCS | Performed by: NEUROLOGICAL SURGERY

## 2019-01-01 PROCEDURE — 3E03317 INTRODUCTION OF OTHER THROMBOLYTIC INTO PERIPHERAL VEIN, PERCUTANEOUS APPROACH: ICD-10-PCS | Performed by: EMERGENCY MEDICINE

## 2019-01-01 PROCEDURE — 25010000002 PEGFILGRASTIM-CBQV 6 MG/0.6ML SOLUTION PREFILLED SYRINGE: Performed by: INTERNAL MEDICINE

## 2019-01-01 PROCEDURE — 71260 CT THORAX DX C+: CPT

## 2019-01-01 PROCEDURE — 94799 UNLISTED PULMONARY SVC/PX: CPT

## 2019-01-01 PROCEDURE — 25010000002 MIDAZOLAM PER 1 MG: Performed by: NEUROLOGICAL SURGERY

## 2019-01-01 PROCEDURE — 88342 IMHCHEM/IMCYTCHM 1ST ANTB: CPT | Performed by: INTERNAL MEDICINE

## 2019-01-01 PROCEDURE — 86850 RBC ANTIBODY SCREEN: CPT | Performed by: PHYSICIAN ASSISTANT

## 2019-01-01 PROCEDURE — 82570 ASSAY OF URINE CREATININE: CPT | Performed by: INTERNAL MEDICINE

## 2019-01-01 PROCEDURE — 99232 SBSQ HOSP IP/OBS MODERATE 35: CPT | Performed by: NURSE PRACTITIONER

## 2019-01-01 PROCEDURE — 0 IOPAMIDOL PER 1 ML: Performed by: HOSPITALIST

## 2019-01-01 PROCEDURE — B314YZZ FLUOROSCOPY OF LEFT COMMON CAROTID ARTERY USING OTHER CONTRAST: ICD-10-PCS | Performed by: NEUROLOGICAL SURGERY

## 2019-01-01 PROCEDURE — 03CG3ZZ EXTIRPATION OF MATTER FROM INTRACRANIAL ARTERY, PERCUTANEOUS APPROACH: ICD-10-PCS | Performed by: NEUROLOGICAL SURGERY

## 2019-01-01 PROCEDURE — 88185 FLOWCYTOMETRY/TC ADD-ON: CPT

## 2019-01-01 PROCEDURE — 83690 ASSAY OF LIPASE: CPT | Performed by: EMERGENCY MEDICINE

## 2019-01-01 PROCEDURE — 85025 COMPLETE CBC W/AUTO DIFF WBC: CPT | Performed by: EMERGENCY MEDICINE

## 2019-01-01 PROCEDURE — 0 IODIXANOL PER 1 ML: Performed by: NEUROLOGICAL SURGERY

## 2019-01-01 PROCEDURE — 25010000003 HEPARIN LOCK FLUCH PER 10 UNITS: Performed by: INTERNAL MEDICINE

## 2019-01-01 PROCEDURE — 83036 HEMOGLOBIN GLYCOSYLATED A1C: CPT | Performed by: NURSE PRACTITIONER

## 2019-01-01 PROCEDURE — 70498 CT ANGIOGRAPHY NECK: CPT

## 2019-01-01 PROCEDURE — 84484 ASSAY OF TROPONIN QUANT: CPT | Performed by: EMERGENCY MEDICINE

## 2019-01-01 PROCEDURE — 84439 ASSAY OF FREE THYROXINE: CPT | Performed by: INTERNAL MEDICINE

## 2019-01-01 PROCEDURE — 96360 HYDRATION IV INFUSION INIT: CPT

## 2019-01-01 PROCEDURE — 99214 OFFICE O/P EST MOD 30 MIN: CPT | Performed by: NURSE PRACTITIONER

## 2019-01-01 PROCEDURE — 71100 X-RAY EXAM RIBS UNI 2 VIEWS: CPT

## 2019-01-01 PROCEDURE — 87522 HEPATITIS C REVRS TRNSCRPJ: CPT | Performed by: INTERNAL MEDICINE

## 2019-01-01 PROCEDURE — 25010000002 DEXAMETHASONE PER 1 MG: Performed by: NURSE ANESTHETIST, CERTIFIED REGISTERED

## 2019-01-01 PROCEDURE — 36600 WITHDRAWAL OF ARTERIAL BLOOD: CPT

## 2019-01-01 PROCEDURE — 82565 ASSAY OF CREATININE: CPT

## 2019-01-01 PROCEDURE — 84484 ASSAY OF TROPONIN QUANT: CPT | Performed by: NURSE PRACTITIONER

## 2019-01-01 PROCEDURE — 77001 FLUOROGUIDE FOR VEIN DEVICE: CPT | Performed by: THORACIC SURGERY (CARDIOTHORACIC VASCULAR SURGERY)

## 2019-01-01 PROCEDURE — 25010000002 PROPOFOL 10 MG/ML EMULSION: Performed by: NURSE ANESTHETIST, CERTIFIED REGISTERED

## 2019-01-01 PROCEDURE — 88305 TISSUE EXAM BY PATHOLOGIST: CPT | Performed by: INTERNAL MEDICINE

## 2019-01-01 PROCEDURE — 71275 CT ANGIOGRAPHY CHEST: CPT

## 2019-01-01 PROCEDURE — 88184 FLOWCYTOMETRY/ TC 1 MARKER: CPT

## 2019-01-01 PROCEDURE — 93010 ELECTROCARDIOGRAM REPORT: CPT | Performed by: INTERNAL MEDICINE

## 2019-01-01 PROCEDURE — 99232 SBSQ HOSP IP/OBS MODERATE 35: CPT | Performed by: HOSPITALIST

## 2019-01-01 PROCEDURE — C1729 CATH, DRAINAGE: HCPCS | Performed by: THORACIC SURGERY (CARDIOTHORACIC VASCULAR SURGERY)

## 2019-01-01 PROCEDURE — A9577 INJ MULTIHANCE: HCPCS | Performed by: INTERNAL MEDICINE

## 2019-01-01 PROCEDURE — 82805 BLOOD GASES W/O2 SATURATION: CPT

## 2019-01-01 PROCEDURE — 93005 ELECTROCARDIOGRAM TRACING: CPT | Performed by: NURSE PRACTITIONER

## 2019-01-01 PROCEDURE — C1894 INTRO/SHEATH, NON-LASER: HCPCS | Performed by: NEUROLOGICAL SURGERY

## 2019-01-01 PROCEDURE — 25010000002 HYDROMORPHONE PER 4 MG: Performed by: EMERGENCY MEDICINE

## 2019-01-01 PROCEDURE — 75989 ABSCESS DRAINAGE UNDER X-RAY: CPT

## 2019-01-01 PROCEDURE — C2628 CATHETER, OCCLUSION: HCPCS | Performed by: NEUROLOGICAL SURGERY

## 2019-01-01 PROCEDURE — 25010000002 CEFUROXIME PER 750 MG: Performed by: NURSE ANESTHETIST, CERTIFIED REGISTERED

## 2019-01-01 PROCEDURE — 81003 URINALYSIS AUTO W/O SCOPE: CPT | Performed by: EMERGENCY MEDICINE

## 2019-01-01 PROCEDURE — 99291 CRITICAL CARE FIRST HOUR: CPT | Performed by: NURSE PRACTITIONER

## 2019-01-01 PROCEDURE — C1725 CATH, TRANSLUMIN NON-LASER: HCPCS | Performed by: NEUROLOGICAL SURGERY

## 2019-01-01 PROCEDURE — 96374 THER/PROPH/DIAG INJ IV PUSH: CPT

## 2019-01-01 PROCEDURE — 70450 CT HEAD/BRAIN W/O DYE: CPT

## 2019-01-01 PROCEDURE — 99285 EMERGENCY DEPT VISIT HI MDM: CPT

## 2019-01-01 PROCEDURE — 63710000001 INSULIN LISPRO (HUMAN) PER 5 UNITS: Performed by: INTERNAL MEDICINE

## 2019-01-01 PROCEDURE — 84100 ASSAY OF PHOSPHORUS: CPT | Performed by: HOSPITALIST

## 2019-01-01 PROCEDURE — 87015 SPECIMEN INFECT AGNT CONCNTJ: CPT | Performed by: INTERNAL MEDICINE

## 2019-01-01 PROCEDURE — C1788 PORT, INDWELLING, IMP: HCPCS | Performed by: THORACIC SURGERY (CARDIOTHORACIC VASCULAR SURGERY)

## 2019-01-01 PROCEDURE — 93880 EXTRACRANIAL BILAT STUDY: CPT

## 2019-01-01 PROCEDURE — 83880 ASSAY OF NATRIURETIC PEPTIDE: CPT | Performed by: EMERGENCY MEDICINE

## 2019-01-01 PROCEDURE — 25010000002 ONDANSETRON PER 1 MG: Performed by: NURSE ANESTHETIST, CERTIFIED REGISTERED

## 2019-01-01 PROCEDURE — 25010000003 LIDOCAINE 1 % SOLUTION: Performed by: NURSE ANESTHETIST, CERTIFIED REGISTERED

## 2019-01-01 PROCEDURE — 25010000002 ALTEPLASE 2 MG RECONSTITUTED SOLUTION: Performed by: NEUROLOGICAL SURGERY

## 2019-01-01 PROCEDURE — 86901 BLOOD TYPING SEROLOGIC RH(D): CPT | Performed by: PHYSICIAN ASSISTANT

## 2019-01-01 PROCEDURE — 87070 CULTURE OTHR SPECIMN AEROBIC: CPT | Performed by: INTERNAL MEDICINE

## 2019-01-01 PROCEDURE — 78306 BONE IMAGING WHOLE BODY: CPT

## 2019-01-01 PROCEDURE — 86850 RBC ANTIBODY SCREEN: CPT | Performed by: NURSE PRACTITIONER

## 2019-01-01 PROCEDURE — 0 GADOBENATE DIMEGLUMINE 529 MG/ML SOLUTION: Performed by: INTERNAL MEDICINE

## 2019-01-01 PROCEDURE — 97165 OT EVAL LOW COMPLEX 30 MIN: CPT

## 2019-01-01 PROCEDURE — 88189 FLOWCYTOMETRY/READ 16 & >: CPT

## 2019-01-01 PROCEDURE — 85610 PROTHROMBIN TIME: CPT | Performed by: EMERGENCY MEDICINE

## 2019-01-01 PROCEDURE — 0042T HC CT CEREBRAL PERFUSION W/WO CONTRAST: CPT

## 2019-01-01 PROCEDURE — 63710000001 INSULIN LISPRO (HUMAN) PER 5 UNITS: Performed by: NURSE PRACTITIONER

## 2019-01-01 PROCEDURE — 70553 MRI BRAIN STEM W/O & W/DYE: CPT

## 2019-01-01 PROCEDURE — A9503 TC99M MEDRONATE: HCPCS | Performed by: HOSPITALIST

## 2019-01-01 PROCEDURE — 83986 ASSAY PH BODY FLUID NOS: CPT | Performed by: INTERNAL MEDICINE

## 2019-01-01 PROCEDURE — 99214 OFFICE O/P EST MOD 30 MIN: CPT | Performed by: INTERNAL MEDICINE

## 2019-01-01 PROCEDURE — 02H633Z INSERTION OF INFUSION DEVICE INTO RIGHT ATRIUM, PERCUTANEOUS APPROACH: ICD-10-PCS | Performed by: THORACIC SURGERY (CARDIOTHORACIC VASCULAR SURGERY)

## 2019-01-01 PROCEDURE — 25010000002 FUROSEMIDE PER 20 MG: Performed by: NURSE PRACTITIONER

## 2019-01-01 PROCEDURE — 88112 CYTOPATH CELL ENHANCE TECH: CPT | Performed by: INTERNAL MEDICINE

## 2019-01-01 PROCEDURE — 87205 SMEAR GRAM STAIN: CPT | Performed by: INTERNAL MEDICINE

## 2019-01-01 PROCEDURE — 96361 HYDRATE IV INFUSION ADD-ON: CPT

## 2019-01-01 PROCEDURE — 85027 COMPLETE CBC AUTOMATED: CPT | Performed by: HOSPITALIST

## 2019-01-01 PROCEDURE — 86901 BLOOD TYPING SEROLOGIC RH(D): CPT | Performed by: NURSE PRACTITIONER

## 2019-01-01 PROCEDURE — 25010000002 HEPARIN (PORCINE) PER 1000 UNITS: Performed by: THORACIC SURGERY (CARDIOTHORACIC VASCULAR SURGERY)

## 2019-01-01 PROCEDURE — 83735 ASSAY OF MAGNESIUM: CPT | Performed by: HOSPITALIST

## 2019-01-01 PROCEDURE — 92523 SPEECH SOUND LANG COMPREHEN: CPT

## 2019-01-01 DEVICE — POWERPORT CLEARVUE ISP IMPLANTABLE PORT WITH ATTACHABLE 8F POLYURETHANE OPEN-ENDED SINGLE-LUMEN VENOUS CATHETER PROCEDURAL KIT
Type: IMPLANTABLE DEVICE | Site: CHEST | Status: FUNCTIONAL
Brand: POWERPORT CLEARVUE

## 2019-01-01 RX ORDER — FAMOTIDINE 10 MG/ML
20 INJECTION, SOLUTION INTRAVENOUS AS NEEDED
Status: CANCELLED | OUTPATIENT
Start: 2019-01-01

## 2019-01-01 RX ORDER — LOSARTAN POTASSIUM 100 MG/1
100 TABLET ORAL NIGHTLY
Qty: 90 TABLET | Refills: 3 | Status: SHIPPED | OUTPATIENT
Start: 2019-01-01 | End: 2019-01-01

## 2019-01-01 RX ORDER — SODIUM CHLORIDE 9 MG/ML
250 INJECTION, SOLUTION INTRAVENOUS ONCE
Status: CANCELLED | OUTPATIENT
Start: 2019-01-01

## 2019-01-01 RX ORDER — PROCHLORPERAZINE MALEATE 10 MG
10 TABLET ORAL ONCE
Status: CANCELLED | OUTPATIENT
Start: 2019-01-01 | End: 2019-01-01

## 2019-01-01 RX ORDER — ONDANSETRON 2 MG/ML
4 INJECTION INTRAMUSCULAR; INTRAVENOUS ONCE
Status: COMPLETED | OUTPATIENT
Start: 2019-01-01 | End: 2019-01-01

## 2019-01-01 RX ORDER — LORAZEPAM 1 MG/1
1 TABLET ORAL EVERY 8 HOURS PRN
Qty: 90 TABLET | Refills: 2 | Status: ON HOLD | OUTPATIENT
Start: 2019-01-01 | End: 2020-01-01 | Stop reason: SDUPTHER

## 2019-01-01 RX ORDER — DILTIAZEM HYDROCHLORIDE 120 MG/1
120 CAPSULE, COATED, EXTENDED RELEASE ORAL DAILY
Qty: 90 CAPSULE | Refills: 3 | Status: ON HOLD | OUTPATIENT
Start: 2019-01-01 | End: 2019-01-01

## 2019-01-01 RX ORDER — SODIUM CHLORIDE 0.9 % (FLUSH) 0.9 %
10 SYRINGE (ML) INJECTION AS NEEDED
Status: CANCELLED | OUTPATIENT
Start: 2019-01-01

## 2019-01-01 RX ORDER — FENTANYL CITRATE 50 UG/ML
50 INJECTION, SOLUTION INTRAMUSCULAR; INTRAVENOUS
Status: DISCONTINUED | OUTPATIENT
Start: 2019-01-01 | End: 2019-01-01 | Stop reason: HOSPADM

## 2019-01-01 RX ORDER — SENNOSIDES 8.6 MG
2 TABLET ORAL 2 TIMES DAILY
Qty: 120 TABLET | Refills: 0 | Status: SHIPPED | OUTPATIENT
Start: 2019-01-01 | End: 2019-01-01

## 2019-01-01 RX ORDER — SODIUM CHLORIDE 9 MG/ML
250 INJECTION, SOLUTION INTRAVENOUS ONCE
Status: COMPLETED | OUTPATIENT
Start: 2019-01-01 | End: 2019-01-01

## 2019-01-01 RX ORDER — PROCHLORPERAZINE MALEATE 10 MG
10 TABLET ORAL ONCE
Status: COMPLETED | OUTPATIENT
Start: 2019-01-01 | End: 2019-01-01

## 2019-01-01 RX ORDER — OXYCODONE HYDROCHLORIDE 15 MG/1
15 TABLET ORAL EVERY 4 HOURS PRN
Status: DISCONTINUED | OUTPATIENT
Start: 2019-01-01 | End: 2019-01-01

## 2019-01-01 RX ORDER — DULOXETIN HYDROCHLORIDE 60 MG/1
60 CAPSULE, DELAYED RELEASE ORAL DAILY
Status: DISCONTINUED | OUTPATIENT
Start: 2019-01-01 | End: 2019-01-01 | Stop reason: HOSPADM

## 2019-01-01 RX ORDER — ONDANSETRON 2 MG/ML
INJECTION INTRAMUSCULAR; INTRAVENOUS AS NEEDED
Status: DISCONTINUED | OUTPATIENT
Start: 2019-01-01 | End: 2019-01-01 | Stop reason: SURG

## 2019-01-01 RX ORDER — SODIUM CHLORIDE 0.9 % (FLUSH) 0.9 %
10 SYRINGE (ML) INJECTION AS NEEDED
Status: DISCONTINUED | OUTPATIENT
Start: 2019-01-01 | End: 2019-01-01 | Stop reason: HOSPADM

## 2019-01-01 RX ORDER — SODIUM CHLORIDE 0.9 % (FLUSH) 0.9 %
20 SYRINGE (ML) INJECTION AS NEEDED
Status: DISCONTINUED | OUTPATIENT
Start: 2019-01-01 | End: 2019-01-01 | Stop reason: HOSPADM

## 2019-01-01 RX ORDER — ONDANSETRON HYDROCHLORIDE 8 MG/1
8 TABLET, FILM COATED ORAL EVERY 8 HOURS PRN
Qty: 30 TABLET | Refills: 5 | Status: SHIPPED | OUTPATIENT
Start: 2019-01-01

## 2019-01-01 RX ORDER — LIDOCAINE AND PRILOCAINE 25; 25 MG/G; MG/G
CREAM TOPICAL AS NEEDED
Qty: 30 G | Refills: 3 | Status: SHIPPED | OUTPATIENT
Start: 2019-01-01 | End: 2019-01-01 | Stop reason: SDUPTHER

## 2019-01-01 RX ORDER — OXYCODONE HYDROCHLORIDE 10 MG/1
10 TABLET ORAL
Qty: 120 TABLET | Refills: 0 | Status: SHIPPED | OUTPATIENT
Start: 2019-01-01 | End: 2019-01-01 | Stop reason: SDUPTHER

## 2019-01-01 RX ORDER — IODIXANOL 320 MG/ML
INJECTION, SOLUTION INTRAVASCULAR AS NEEDED
Status: DISCONTINUED | OUTPATIENT
Start: 2019-01-01 | End: 2019-01-01 | Stop reason: HOSPADM

## 2019-01-01 RX ORDER — FAMOTIDINE 20 MG/1
20 TABLET, FILM COATED ORAL
Status: CANCELLED | OUTPATIENT
Start: 2019-01-01

## 2019-01-01 RX ORDER — SODIUM CHLORIDE 0.9 % (FLUSH) 0.9 %
10 SYRINGE (ML) INJECTION AS NEEDED
Status: DISCONTINUED | OUTPATIENT
Start: 2019-01-01 | End: 2019-01-01

## 2019-01-01 RX ORDER — DIPHENHYDRAMINE HYDROCHLORIDE 50 MG/ML
50 INJECTION INTRAMUSCULAR; INTRAVENOUS AS NEEDED
Status: CANCELLED | OUTPATIENT
Start: 2019-01-01

## 2019-01-01 RX ORDER — SODIUM CHLORIDE 9 MG/ML
1000 INJECTION, SOLUTION INTRAVENOUS ONCE
Status: CANCELLED
Start: 2019-01-01

## 2019-01-01 RX ORDER — DILTIAZEM HYDROCHLORIDE 120 MG/1
120 CAPSULE, COATED, EXTENDED RELEASE ORAL DAILY
Status: DISCONTINUED | OUTPATIENT
Start: 2019-01-01 | End: 2019-01-01

## 2019-01-01 RX ORDER — SODIUM CHLORIDE 0.9 % (FLUSH) 0.9 %
3-10 SYRINGE (ML) INJECTION AS NEEDED
Status: DISCONTINUED | OUTPATIENT
Start: 2019-01-01 | End: 2019-01-01 | Stop reason: HOSPADM

## 2019-01-01 RX ORDER — ONDANSETRON 2 MG/ML
4 INJECTION INTRAMUSCULAR; INTRAVENOUS ONCE AS NEEDED
Status: DISCONTINUED | OUTPATIENT
Start: 2019-01-01 | End: 2019-01-01 | Stop reason: HOSPADM

## 2019-01-01 RX ORDER — LIDOCAINE HYDROCHLORIDE 10 MG/ML
INJECTION, SOLUTION INFILTRATION; PERINEURAL AS NEEDED
Status: DISCONTINUED | OUTPATIENT
Start: 2019-01-01 | End: 2019-01-01 | Stop reason: SURG

## 2019-01-01 RX ORDER — OXYCODONE HYDROCHLORIDE 5 MG/1
10 TABLET ORAL
Status: DISCONTINUED | OUTPATIENT
Start: 2019-01-01 | End: 2019-01-01 | Stop reason: HOSPADM

## 2019-01-01 RX ORDER — PROMETHAZINE HYDROCHLORIDE 25 MG/ML
6.25 INJECTION, SOLUTION INTRAMUSCULAR; INTRAVENOUS ONCE AS NEEDED
Status: DISCONTINUED | OUTPATIENT
Start: 2019-01-01 | End: 2019-01-01 | Stop reason: HOSPADM

## 2019-01-01 RX ORDER — SODIUM CHLORIDE, SODIUM LACTATE, POTASSIUM CHLORIDE, CALCIUM CHLORIDE 600; 310; 30; 20 MG/100ML; MG/100ML; MG/100ML; MG/100ML
INJECTION, SOLUTION INTRAVENOUS CONTINUOUS PRN
Status: DISCONTINUED | OUTPATIENT
Start: 2019-01-01 | End: 2019-01-01 | Stop reason: SURG

## 2019-01-01 RX ORDER — HYDROMORPHONE HYDROCHLORIDE 1 MG/ML
0.5 INJECTION, SOLUTION INTRAMUSCULAR; INTRAVENOUS; SUBCUTANEOUS ONCE
Status: COMPLETED | OUTPATIENT
Start: 2019-01-01 | End: 2019-01-01

## 2019-01-01 RX ORDER — SODIUM CHLORIDE 0.9 % (FLUSH) 0.9 %
10 SYRINGE (ML) INJECTION EVERY 12 HOURS SCHEDULED
Status: DISCONTINUED | OUTPATIENT
Start: 2019-01-01 | End: 2019-01-01 | Stop reason: HOSPADM

## 2019-01-01 RX ORDER — HYDROMORPHONE HYDROCHLORIDE 1 MG/ML
0.5 INJECTION, SOLUTION INTRAMUSCULAR; INTRAVENOUS; SUBCUTANEOUS
Status: DISCONTINUED | OUTPATIENT
Start: 2019-01-01 | End: 2019-01-01 | Stop reason: HOSPADM

## 2019-01-01 RX ORDER — OXYCODONE HYDROCHLORIDE 15 MG/1
15 TABLET ORAL EVERY 4 HOURS PRN
Status: DISCONTINUED | OUTPATIENT
Start: 2019-01-01 | End: 2019-01-01 | Stop reason: HOSPADM

## 2019-01-01 RX ORDER — SENNA AND DOCUSATE SODIUM 50; 8.6 MG/1; MG/1
2 TABLET, FILM COATED ORAL 2 TIMES DAILY
Status: DISCONTINUED | OUTPATIENT
Start: 2019-01-01 | End: 2019-01-01

## 2019-01-01 RX ORDER — ATORVASTATIN CALCIUM 40 MG/1
80 TABLET, FILM COATED ORAL NIGHTLY
Status: DISCONTINUED | OUTPATIENT
Start: 2019-01-01 | End: 2019-01-01 | Stop reason: SDUPTHER

## 2019-01-01 RX ORDER — ALBUTEROL SULFATE 90 UG/1
4 AEROSOL, METERED RESPIRATORY (INHALATION) ONCE
Status: DISCONTINUED | OUTPATIENT
Start: 2019-01-01 | End: 2019-01-01 | Stop reason: HOSPADM

## 2019-01-01 RX ORDER — HYDRALAZINE HYDROCHLORIDE 20 MG/ML
5 INJECTION INTRAMUSCULAR; INTRAVENOUS ONCE
Status: COMPLETED | OUTPATIENT
Start: 2019-01-01 | End: 2019-01-01

## 2019-01-01 RX ORDER — FUROSEMIDE 10 MG/ML
20 INJECTION INTRAMUSCULAR; INTRAVENOUS ONCE
Status: COMPLETED | OUTPATIENT
Start: 2019-01-01 | End: 2019-01-01

## 2019-01-01 RX ORDER — SENNOSIDES 8.6 MG
2 TABLET ORAL 2 TIMES DAILY
Status: DISCONTINUED | OUTPATIENT
Start: 2019-01-01 | End: 2019-01-01 | Stop reason: HOSPADM

## 2019-01-01 RX ORDER — PANTOPRAZOLE SODIUM 40 MG/1
40 TABLET, DELAYED RELEASE ORAL EVERY MORNING
Status: DISCONTINUED | OUTPATIENT
Start: 2019-01-01 | End: 2019-01-01 | Stop reason: HOSPADM

## 2019-01-01 RX ORDER — PALONOSETRON 0.05 MG/ML
0.25 INJECTION, SOLUTION INTRAVENOUS ONCE
Status: CANCELLED | OUTPATIENT
Start: 2019-01-01

## 2019-01-01 RX ORDER — ESMOLOL HYDROCHLORIDE 10 MG/ML
INJECTION INTRAVENOUS AS NEEDED
Status: DISCONTINUED | OUTPATIENT
Start: 2019-01-01 | End: 2019-01-01 | Stop reason: SURG

## 2019-01-01 RX ORDER — SODIUM CHLORIDE 9 MG/ML
75 INJECTION, SOLUTION INTRAVENOUS CONTINUOUS
Status: DISCONTINUED | OUTPATIENT
Start: 2019-01-01 | End: 2019-01-01

## 2019-01-01 RX ORDER — ASPIRIN 325 MG
325 TABLET ORAL DAILY
Status: DISCONTINUED | OUTPATIENT
Start: 2019-01-01 | End: 2019-01-01 | Stop reason: SDUPTHER

## 2019-01-01 RX ORDER — SODIUM CHLORIDE 9 MG/ML
9 INJECTION, SOLUTION INTRAVENOUS CONTINUOUS PRN
Status: DISCONTINUED | OUTPATIENT
Start: 2019-01-01 | End: 2019-01-01 | Stop reason: HOSPADM

## 2019-01-01 RX ORDER — GLYCOPYRROLATE 0.2 MG/ML
INJECTION INTRAMUSCULAR; INTRAVENOUS AS NEEDED
Status: DISCONTINUED | OUTPATIENT
Start: 2019-01-01 | End: 2019-01-01 | Stop reason: SURG

## 2019-01-01 RX ORDER — DEXTROSE MONOHYDRATE 25 G/50ML
25 INJECTION, SOLUTION INTRAVENOUS
Status: DISCONTINUED | OUTPATIENT
Start: 2019-01-01 | End: 2019-01-01 | Stop reason: HOSPADM

## 2019-01-01 RX ORDER — LIDOCAINE 50 MG/G
2 PATCH TOPICAL
Status: DISCONTINUED | OUTPATIENT
Start: 2019-01-01 | End: 2019-01-01 | Stop reason: HOSPADM

## 2019-01-01 RX ORDER — PROMETHAZINE HYDROCHLORIDE 25 MG/1
25 TABLET ORAL ONCE AS NEEDED
Status: DISCONTINUED | OUTPATIENT
Start: 2019-01-01 | End: 2019-01-01 | Stop reason: HOSPADM

## 2019-01-01 RX ORDER — NEOSTIGMINE METHYLSULFATE 5 MG/5 ML
SYRINGE (ML) INTRAVENOUS AS NEEDED
Status: DISCONTINUED | OUTPATIENT
Start: 2019-01-01 | End: 2019-01-01 | Stop reason: SURG

## 2019-01-01 RX ORDER — OXYCODONE HYDROCHLORIDE 5 MG/1
10 TABLET ORAL EVERY 6 HOURS PRN
Status: DISCONTINUED | OUTPATIENT
Start: 2019-01-01 | End: 2019-01-01

## 2019-01-01 RX ORDER — FAMOTIDINE 10 MG/ML
20 INJECTION, SOLUTION INTRAVENOUS AS NEEDED
Status: DISCONTINUED | OUTPATIENT
Start: 2019-01-01 | End: 2019-01-01 | Stop reason: HOSPADM

## 2019-01-01 RX ORDER — PROPOFOL 10 MG/ML
VIAL (ML) INTRAVENOUS CONTINUOUS PRN
Status: DISCONTINUED | OUTPATIENT
Start: 2019-01-01 | End: 2019-01-01 | Stop reason: SURG

## 2019-01-01 RX ORDER — PHENYLEPHRINE HCL IN 0.9% NACL 0.5 MG/5ML
.5-3 SYRINGE (ML) INTRAVENOUS
Status: DISCONTINUED | OUTPATIENT
Start: 2019-01-01 | End: 2019-01-01 | Stop reason: HOSPADM

## 2019-01-01 RX ORDER — SODIUM CHLORIDE 9 MG/ML
250 INJECTION, SOLUTION INTRAVENOUS AS NEEDED
Status: CANCELLED | OUTPATIENT
Start: 2019-01-01

## 2019-01-01 RX ORDER — MAGNESIUM HYDROXIDE 1200 MG/15ML
LIQUID ORAL AS NEEDED
Status: DISCONTINUED | OUTPATIENT
Start: 2019-01-01 | End: 2019-01-01 | Stop reason: HOSPADM

## 2019-01-01 RX ORDER — NICOTINE POLACRILEX 4 MG
15 LOZENGE BUCCAL
Status: DISCONTINUED | OUTPATIENT
Start: 2019-01-01 | End: 2019-01-01 | Stop reason: HOSPADM

## 2019-01-01 RX ORDER — ASPIRIN 81 MG/1
81 TABLET ORAL DAILY
Start: 2019-01-01

## 2019-01-01 RX ORDER — SODIUM CHLORIDE, SODIUM LACTATE, POTASSIUM CHLORIDE, CALCIUM CHLORIDE 600; 310; 30; 20 MG/100ML; MG/100ML; MG/100ML; MG/100ML
20 INJECTION, SOLUTION INTRAVENOUS ONCE
Status: DISCONTINUED | OUTPATIENT
Start: 2019-01-01 | End: 2019-01-01 | Stop reason: HOSPADM

## 2019-01-01 RX ORDER — SODIUM CHLORIDE 9 MG/ML
75 INJECTION, SOLUTION INTRAVENOUS CONTINUOUS
Status: DISCONTINUED | OUTPATIENT
Start: 2019-01-01 | End: 2019-01-01 | Stop reason: HOSPADM

## 2019-01-01 RX ORDER — BISACODYL 10 MG
10 SUPPOSITORY, RECTAL RECTAL DAILY PRN
Status: DISCONTINUED | OUTPATIENT
Start: 2019-01-01 | End: 2019-01-01 | Stop reason: HOSPADM

## 2019-01-01 RX ORDER — SODIUM CHLORIDE 0.9 % (FLUSH) 0.9 %
10 SYRINGE (ML) INJECTION AS NEEDED
Status: DISCONTINUED | OUTPATIENT
Start: 2019-01-01 | End: 2019-01-01 | Stop reason: SDUPTHER

## 2019-01-01 RX ORDER — MIRTAZAPINE 15 MG/1
15 TABLET, FILM COATED ORAL NIGHTLY
Qty: 90 TABLET | Refills: 3 | Status: SHIPPED | OUTPATIENT
Start: 2019-01-01

## 2019-01-01 RX ORDER — LIDOCAINE HYDROCHLORIDE 10 MG/ML
20 INJECTION, SOLUTION EPIDURAL; INFILTRATION; INTRACAUDAL; PERINEURAL ONCE
Status: DISCONTINUED | OUTPATIENT
Start: 2019-01-01 | End: 2019-01-01

## 2019-01-01 RX ORDER — CHOLECALCIFEROL (VITAMIN D3) 125 MCG
5 CAPSULE ORAL NIGHTLY PRN
Status: DISCONTINUED | OUTPATIENT
Start: 2019-01-01 | End: 2019-01-01 | Stop reason: HOSPADM

## 2019-01-01 RX ORDER — ALUMINA, MAGNESIA, AND SIMETHICONE 2400; 2400; 240 MG/30ML; MG/30ML; MG/30ML
15 SUSPENSION ORAL ONCE
Status: COMPLETED | OUTPATIENT
Start: 2019-01-01 | End: 2019-01-01

## 2019-01-01 RX ORDER — MEGESTROL ACETATE 40 MG/ML
800 SUSPENSION ORAL DAILY
Qty: 480 ML | Refills: 1 | Status: ON HOLD | OUTPATIENT
Start: 2019-01-01 | End: 2020-01-01

## 2019-01-01 RX ORDER — MORPHINE SULFATE 15 MG/1
45 TABLET, FILM COATED, EXTENDED RELEASE ORAL 2 TIMES DAILY
Qty: 180 TABLET | Refills: 0 | Status: SHIPPED | OUTPATIENT
Start: 2019-01-01 | End: 2020-01-01 | Stop reason: SDUPTHER

## 2019-01-01 RX ORDER — GABAPENTIN 300 MG/1
300 CAPSULE ORAL 3 TIMES DAILY
Status: DISCONTINUED | OUTPATIENT
Start: 2019-01-01 | End: 2019-01-01

## 2019-01-01 RX ORDER — SODIUM CHLORIDE 9 MG/ML
100 INJECTION, SOLUTION INTRAVENOUS ONCE
Status: COMPLETED | OUTPATIENT
Start: 2019-01-01 | End: 2019-01-01

## 2019-01-01 RX ORDER — LORAZEPAM 2 MG/ML
1 INJECTION INTRAMUSCULAR EVERY 4 HOURS PRN
Status: CANCELLED
Start: 2019-01-01

## 2019-01-01 RX ORDER — OXYCODONE HYDROCHLORIDE 15 MG/1
15 TABLET ORAL
Status: DISCONTINUED | OUTPATIENT
Start: 2019-01-01 | End: 2019-01-01

## 2019-01-01 RX ORDER — LIDOCAINE 50 MG/G
1 PATCH TOPICAL
Status: DISCONTINUED | OUTPATIENT
Start: 2019-01-01 | End: 2019-01-01

## 2019-01-01 RX ORDER — MIRTAZAPINE 7.5 MG/1
7.5 TABLET, FILM COATED ORAL NIGHTLY
Qty: 90 TABLET | Refills: 3 | Status: SHIPPED | OUTPATIENT
Start: 2019-01-01 | End: 2019-01-01 | Stop reason: SDUPTHER

## 2019-01-01 RX ORDER — ACETAMINOPHEN 325 MG/1
650 TABLET ORAL 3 TIMES DAILY
Status: DISCONTINUED | OUTPATIENT
Start: 2019-01-01 | End: 2019-01-01 | Stop reason: HOSPADM

## 2019-01-01 RX ORDER — LORAZEPAM 2 MG/ML
1 INJECTION INTRAMUSCULAR EVERY 4 HOURS PRN
Status: DISCONTINUED | OUTPATIENT
Start: 2019-01-01 | End: 2020-01-01 | Stop reason: HOSPADM

## 2019-01-01 RX ORDER — FLUTICASONE PROPIONATE 50 MCG
1 SPRAY, SUSPENSION (ML) NASAL 2 TIMES DAILY
Status: DISCONTINUED | OUTPATIENT
Start: 2019-01-01 | End: 2019-01-01 | Stop reason: HOSPADM

## 2019-01-01 RX ORDER — ASPIRIN 300 MG/1
300 SUPPOSITORY RECTAL DAILY
Status: DISCONTINUED | OUTPATIENT
Start: 2019-01-01 | End: 2019-01-01 | Stop reason: SDUPTHER

## 2019-01-01 RX ORDER — MORPHINE SULFATE 15 MG/1
45 TABLET, FILM COATED, EXTENDED RELEASE ORAL 2 TIMES DAILY
Qty: 180 TABLET | Refills: 0 | Status: SHIPPED | OUTPATIENT
Start: 2019-01-01 | End: 2019-01-01 | Stop reason: SDUPTHER

## 2019-01-01 RX ORDER — LABETALOL HYDROCHLORIDE 5 MG/ML
10 INJECTION, SOLUTION INTRAVENOUS
Status: DISCONTINUED | OUTPATIENT
Start: 2019-01-01 | End: 2019-01-01 | Stop reason: HOSPADM

## 2019-01-01 RX ORDER — ATORVASTATIN CALCIUM 20 MG/1
20 TABLET, FILM COATED ORAL NIGHTLY
Status: DISCONTINUED | OUTPATIENT
Start: 2019-01-01 | End: 2019-01-01 | Stop reason: HOSPADM

## 2019-01-01 RX ORDER — PROMETHAZINE HYDROCHLORIDE 25 MG/1
25 SUPPOSITORY RECTAL ONCE AS NEEDED
Status: DISCONTINUED | OUTPATIENT
Start: 2019-01-01 | End: 2019-01-01 | Stop reason: HOSPADM

## 2019-01-01 RX ORDER — TC 99M MEDRONATE 20 MG/10ML
26.8 INJECTION, POWDER, LYOPHILIZED, FOR SOLUTION INTRAVENOUS
Status: COMPLETED | OUTPATIENT
Start: 2019-01-01 | End: 2019-01-01

## 2019-01-01 RX ORDER — LIDOCAINE HYDROCHLORIDE 10 MG/ML
INJECTION, SOLUTION EPIDURAL; INFILTRATION; INTRACAUDAL; PERINEURAL AS NEEDED
Status: DISCONTINUED | OUTPATIENT
Start: 2019-01-01 | End: 2019-01-01 | Stop reason: HOSPADM

## 2019-01-01 RX ORDER — ZOLPIDEM TARTRATE 10 MG/1
10 TABLET ORAL NIGHTLY PRN
Qty: 30 TABLET | Refills: 2 | Status: SHIPPED | OUTPATIENT
Start: 2019-01-01 | End: 2020-01-01 | Stop reason: SDUPTHER

## 2019-01-01 RX ORDER — ASPIRIN 325 MG
325 TABLET ORAL DAILY
Status: DISCONTINUED | OUTPATIENT
Start: 2019-01-01 | End: 2019-01-01 | Stop reason: HOSPADM

## 2019-01-01 RX ORDER — PALONOSETRON 0.05 MG/ML
0.25 INJECTION, SOLUTION INTRAVENOUS ONCE
Status: COMPLETED | OUTPATIENT
Start: 2019-01-01 | End: 2019-01-01

## 2019-01-01 RX ORDER — GABAPENTIN 300 MG/1
300 CAPSULE ORAL 3 TIMES DAILY
Qty: 90 CAPSULE | Refills: 2 | Status: SHIPPED | OUTPATIENT
Start: 2019-01-01 | End: 2019-01-01

## 2019-01-01 RX ORDER — ATORVASTATIN CALCIUM 80 MG/1
80 TABLET, FILM COATED ORAL NIGHTLY
Qty: 30 TABLET | Refills: 3 | Status: SHIPPED | OUTPATIENT
Start: 2019-01-01 | End: 2020-01-01 | Stop reason: SDUPTHER

## 2019-01-01 RX ORDER — ACETAMINOPHEN 325 MG/1
650 TABLET ORAL EVERY 6 HOURS PRN
Status: DISCONTINUED | OUTPATIENT
Start: 2019-01-01 | End: 2019-01-01 | Stop reason: HOSPADM

## 2019-01-01 RX ORDER — LIDOCAINE HYDROCHLORIDE 20 MG/ML
15 SOLUTION OROPHARYNGEAL ONCE
Status: COMPLETED | OUTPATIENT
Start: 2019-01-01 | End: 2019-01-01

## 2019-01-01 RX ORDER — DIPHENHYDRAMINE HYDROCHLORIDE 50 MG/ML
50 INJECTION INTRAMUSCULAR; INTRAVENOUS AS NEEDED
Status: DISCONTINUED | OUTPATIENT
Start: 2019-01-01 | End: 2019-01-01 | Stop reason: HOSPADM

## 2019-01-01 RX ORDER — GABAPENTIN 300 MG/1
600 CAPSULE ORAL 3 TIMES DAILY
Status: DISCONTINUED | OUTPATIENT
Start: 2019-01-01 | End: 2019-01-01 | Stop reason: HOSPADM

## 2019-01-01 RX ORDER — NALOXONE HCL 0.4 MG/ML
0.4 VIAL (ML) INJECTION
Status: DISCONTINUED | OUTPATIENT
Start: 2019-01-01 | End: 2019-01-01 | Stop reason: HOSPADM

## 2019-01-01 RX ORDER — CALCIUM CARBONATE 750 MG/1
750 TABLET, CHEWABLE ORAL 3 TIMES DAILY PRN
Status: DISCONTINUED | OUTPATIENT
Start: 2019-01-01 | End: 2019-01-01 | Stop reason: HOSPADM

## 2019-01-01 RX ORDER — METFORMIN HYDROCHLORIDE 500 MG/1
1000 TABLET, EXTENDED RELEASE ORAL 2 TIMES DAILY WITH MEALS
Status: DISCONTINUED | OUTPATIENT
Start: 2019-01-01 | End: 2019-01-01 | Stop reason: HOSPADM

## 2019-01-01 RX ORDER — OXYCODONE HYDROCHLORIDE 60 MG/1
60 TABLET, FILM COATED, EXTENDED RELEASE ORAL EVERY 12 HOURS
Qty: 60 TABLET | Refills: 0 | Status: SHIPPED | OUTPATIENT
Start: 2019-01-01 | End: 2019-01-01

## 2019-01-01 RX ORDER — DEXAMETHASONE SODIUM PHOSPHATE 4 MG/ML
INJECTION, SOLUTION INTRA-ARTICULAR; INTRALESIONAL; INTRAMUSCULAR; INTRAVENOUS; SOFT TISSUE AS NEEDED
Status: DISCONTINUED | OUTPATIENT
Start: 2019-01-01 | End: 2019-01-01 | Stop reason: SURG

## 2019-01-01 RX ORDER — FENTANYL CITRATE 50 UG/ML
INJECTION, SOLUTION INTRAMUSCULAR; INTRAVENOUS AS NEEDED
Status: DISCONTINUED | OUTPATIENT
Start: 2019-01-01 | End: 2019-01-01 | Stop reason: SURG

## 2019-01-01 RX ORDER — HEPARIN SODIUM (PORCINE) LOCK FLUSH IV SOLN 100 UNIT/ML 100 UNIT/ML
500 SOLUTION INTRAVENOUS AS NEEDED
Status: CANCELLED | OUTPATIENT
Start: 2019-01-01

## 2019-01-01 RX ORDER — ROSUVASTATIN CALCIUM 20 MG/1
20 TABLET, COATED ORAL DAILY
Qty: 90 TABLET | Refills: 1 | Status: SHIPPED | OUTPATIENT
Start: 2019-01-01 | End: 2019-01-01

## 2019-01-01 RX ORDER — ONDANSETRON 2 MG/ML
4 INJECTION INTRAMUSCULAR; INTRAVENOUS EVERY 6 HOURS PRN
Status: DISCONTINUED | OUTPATIENT
Start: 2019-01-01 | End: 2019-01-01 | Stop reason: HOSPADM

## 2019-01-01 RX ORDER — LIDOCAINE AND PRILOCAINE 25; 25 MG/G; MG/G
CREAM TOPICAL AS NEEDED
Qty: 30 G | Refills: 3 | Status: SHIPPED | OUTPATIENT
Start: 2019-01-01

## 2019-01-01 RX ORDER — DIPHENHYDRAMINE HCL 25 MG
25 TABLET ORAL ONCE
Status: CANCELLED | OUTPATIENT
Start: 2019-01-01 | End: 2019-01-01

## 2019-01-01 RX ORDER — MORPHINE SULFATE 15 MG/1
15 TABLET, FILM COATED, EXTENDED RELEASE ORAL 2 TIMES DAILY
Qty: 60 TABLET | Refills: 0 | Status: SHIPPED | OUTPATIENT
Start: 2019-01-01 | End: 2019-01-01 | Stop reason: SDUPTHER

## 2019-01-01 RX ORDER — SODIUM CHLORIDE 9 MG/ML
1000 INJECTION, SOLUTION INTRAVENOUS ONCE
Status: COMPLETED | OUTPATIENT
Start: 2019-01-01 | End: 2019-01-01

## 2019-01-01 RX ORDER — SODIUM CHLORIDE 9 MG/ML
250 INJECTION, SOLUTION INTRAVENOUS ONCE
Status: DISCONTINUED | OUTPATIENT
Start: 2019-01-01 | End: 2020-01-01 | Stop reason: HOSPADM

## 2019-01-01 RX ORDER — ONDANSETRON 4 MG/1
8 TABLET, FILM COATED ORAL EVERY 8 HOURS PRN
Status: DISCONTINUED | OUTPATIENT
Start: 2019-01-01 | End: 2019-01-01

## 2019-01-01 RX ORDER — SODIUM CHLORIDE 9 MG/ML
250 INJECTION, SOLUTION INTRAVENOUS ONCE
Status: DISCONTINUED | OUTPATIENT
Start: 2019-01-01 | End: 2019-01-01 | Stop reason: HOSPADM

## 2019-01-01 RX ORDER — ZOLPIDEM TARTRATE 10 MG/1
10 TABLET ORAL NIGHTLY PRN
Qty: 30 TABLET | Refills: 2 | Status: SHIPPED | OUTPATIENT
Start: 2019-01-01 | End: 2019-01-01 | Stop reason: SDUPTHER

## 2019-01-01 RX ORDER — ASPIRIN 300 MG/1
300 SUPPOSITORY RECTAL DAILY
Status: DISCONTINUED | OUTPATIENT
Start: 2019-01-01 | End: 2019-01-01 | Stop reason: HOSPADM

## 2019-01-01 RX ORDER — FAMOTIDINE 20 MG/1
20 TABLET, FILM COATED ORAL 2 TIMES DAILY PRN
Status: DISCONTINUED | OUTPATIENT
Start: 2019-01-01 | End: 2019-01-01 | Stop reason: HOSPADM

## 2019-01-01 RX ORDER — METFORMIN HYDROCHLORIDE 500 MG/1
1000 TABLET, EXTENDED RELEASE ORAL 2 TIMES DAILY
Qty: 360 TABLET | Refills: 3 | Status: SHIPPED | OUTPATIENT
Start: 2019-01-01

## 2019-01-01 RX ORDER — ASPIRIN 325 MG
325 TABLET ORAL DAILY
Qty: 30 TABLET | Refills: 2 | Status: SHIPPED | OUTPATIENT
Start: 2019-01-01 | End: 2019-01-01 | Stop reason: HOSPADM

## 2019-01-01 RX ORDER — PANTOPRAZOLE SODIUM 40 MG/1
40 TABLET, DELAYED RELEASE ORAL EVERY MORNING
Qty: 28 TABLET | Refills: 0 | Status: SHIPPED | OUTPATIENT
Start: 2019-01-01 | End: 2019-01-01

## 2019-01-01 RX ORDER — ATORVASTATIN CALCIUM 40 MG/1
80 TABLET, FILM COATED ORAL NIGHTLY
Status: DISCONTINUED | OUTPATIENT
Start: 2019-01-01 | End: 2019-01-01 | Stop reason: HOSPADM

## 2019-01-01 RX ORDER — ACETAMINOPHEN 325 MG/1
650 TABLET ORAL ONCE
Status: CANCELLED | OUTPATIENT
Start: 2019-01-01 | End: 2019-01-01

## 2019-01-01 RX ORDER — SODIUM CHLORIDE 0.9 % (FLUSH) 0.9 %
10 SYRINGE (ML) INJECTION EVERY 12 HOURS SCHEDULED
Status: DISCONTINUED | OUTPATIENT
Start: 2019-01-01 | End: 2019-01-01 | Stop reason: SDUPTHER

## 2019-01-01 RX ORDER — LIDOCAINE HYDROCHLORIDE 10 MG/ML
0.5 INJECTION, SOLUTION EPIDURAL; INFILTRATION; INTRACAUDAL; PERINEURAL ONCE AS NEEDED
Status: DISCONTINUED | OUTPATIENT
Start: 2019-01-01 | End: 2019-01-01 | Stop reason: HOSPADM

## 2019-01-01 RX ORDER — MIDAZOLAM HYDROCHLORIDE 1 MG/ML
INJECTION INTRAMUSCULAR; INTRAVENOUS AS NEEDED
Status: DISCONTINUED | OUTPATIENT
Start: 2019-01-01 | End: 2019-01-01 | Stop reason: HOSPADM

## 2019-01-01 RX ORDER — SODIUM CHLORIDE 0.9 % (FLUSH) 0.9 %
3 SYRINGE (ML) INJECTION EVERY 12 HOURS SCHEDULED
Status: DISCONTINUED | OUTPATIENT
Start: 2019-01-01 | End: 2019-01-01 | Stop reason: HOSPADM

## 2019-01-01 RX ORDER — MIRTAZAPINE 7.5 MG/1
7.5 TABLET, FILM COATED ORAL NIGHTLY
Qty: 90 TABLET | Refills: 3 | Status: SHIPPED | OUTPATIENT
Start: 2019-01-01 | End: 2019-01-01

## 2019-01-01 RX ORDER — SENNOSIDES 8.6 MG
2 TABLET ORAL 2 TIMES DAILY
Status: DISCONTINUED | OUTPATIENT
Start: 2019-01-01 | End: 2019-01-01

## 2019-01-01 RX ORDER — LIDOCAINE 50 MG/G
2 PATCH TOPICAL
Qty: 60 PATCH | Refills: 0 | Status: SHIPPED | OUTPATIENT
Start: 2019-01-01 | End: 2019-01-01

## 2019-01-01 RX ORDER — AMLODIPINE BESYLATE 5 MG/1
5 TABLET ORAL
Status: DISCONTINUED | OUTPATIENT
Start: 2019-01-01 | End: 2019-01-01 | Stop reason: HOSPADM

## 2019-01-01 RX ORDER — PROPOFOL 10 MG/ML
VIAL (ML) INTRAVENOUS AS NEEDED
Status: DISCONTINUED | OUTPATIENT
Start: 2019-01-01 | End: 2019-01-01 | Stop reason: SURG

## 2019-01-01 RX ORDER — OXYCODONE HYDROCHLORIDE 10 MG/1
10 TABLET ORAL
Qty: 120 TABLET | Refills: 0 | Status: SHIPPED | OUTPATIENT
Start: 2019-01-01 | End: 2020-01-01 | Stop reason: SDUPTHER

## 2019-01-01 RX ADMIN — IOPAMIDOL 135 ML: 755 INJECTION, SOLUTION INTRAVENOUS at 02:38

## 2019-01-01 RX ADMIN — SODIUM CHLORIDE 250 ML: 9 INJECTION, SOLUTION INTRAVENOUS at 17:04

## 2019-01-01 RX ADMIN — OXYCODONE HYDROCHLORIDE 10 MG: 5 TABLET ORAL at 00:33

## 2019-01-01 RX ADMIN — IOPAMIDOL 95 ML: 755 INJECTION, SOLUTION INTRAVENOUS at 22:47

## 2019-01-01 RX ADMIN — FLUTICASONE PROPIONATE 1 SPRAY: 50 SPRAY, METERED NASAL at 09:09

## 2019-01-01 RX ADMIN — FLUTICASONE PROPIONATE 1 SPRAY: 50 SPRAY, METERED NASAL at 21:07

## 2019-01-01 RX ADMIN — HYDROMORPHONE HYDROCHLORIDE 0.5 MG: 1 INJECTION, SOLUTION INTRAMUSCULAR; INTRAVENOUS; SUBCUTANEOUS at 06:22

## 2019-01-01 RX ADMIN — DEXAMETHASONE SODIUM PHOSPHATE 8 MG: 4 INJECTION, SOLUTION INTRAMUSCULAR; INTRAVENOUS at 10:30

## 2019-01-01 RX ADMIN — INSULIN LISPRO 4 UNITS: 100 INJECTION, SOLUTION INTRAVENOUS; SUBCUTANEOUS at 20:15

## 2019-01-01 RX ADMIN — OXYCODONE HYDROCHLORIDE 10 MG: 5 TABLET ORAL at 12:52

## 2019-01-01 RX ADMIN — DEXAMETHASONE SODIUM PHOSPHATE 12 MG: 4 INJECTION, SOLUTION INTRAMUSCULAR; INTRAVENOUS at 14:09

## 2019-01-01 RX ADMIN — ONDANSETRON 16 MG: 2 INJECTION INTRAMUSCULAR; INTRAVENOUS at 11:43

## 2019-01-01 RX ADMIN — SODIUM CHLORIDE 75 ML/HR: 9 INJECTION, SOLUTION INTRAVENOUS at 05:17

## 2019-01-01 RX ADMIN — GABAPENTIN 300 MG: 300 CAPSULE ORAL at 16:04

## 2019-01-01 RX ADMIN — ATORVASTATIN CALCIUM 20 MG: 20 TABLET, FILM COATED ORAL at 20:15

## 2019-01-01 RX ADMIN — SODIUM CHLORIDE 200 MG: 9 INJECTION, SOLUTION INTRAVENOUS at 13:58

## 2019-01-01 RX ADMIN — HEPARIN SODIUM (PORCINE) LOCK FLUSH IV SOLN 100 UNIT/ML 500 UNITS: 100 SOLUTION at 17:00

## 2019-01-01 RX ADMIN — GABAPENTIN 300 MG: 300 CAPSULE ORAL at 20:19

## 2019-01-01 RX ADMIN — GADOBENATE DIMEGLUMINE 15 ML: 529 INJECTION, SOLUTION INTRAVENOUS at 05:45

## 2019-01-01 RX ADMIN — INSULIN LISPRO 6 UNITS: 100 INJECTION, SOLUTION INTRAVENOUS; SUBCUTANEOUS at 13:45

## 2019-01-01 RX ADMIN — SODIUM CHLORIDE 1000 ML: 9 INJECTION, SOLUTION INTRAVENOUS at 11:26

## 2019-01-01 RX ADMIN — OXYCODONE HYDROCHLORIDE 10 MG: 5 TABLET ORAL at 11:31

## 2019-01-01 RX ADMIN — INSULIN LISPRO 7 UNITS: 100 INJECTION, SOLUTION INTRAVENOUS; SUBCUTANEOUS at 17:37

## 2019-01-01 RX ADMIN — ACETAMINOPHEN 650 MG: 325 TABLET ORAL at 21:35

## 2019-01-01 RX ADMIN — SENNOSIDES AND DOCUSATE SODIUM 2 TABLET: 8.6; 5 TABLET ORAL at 08:42

## 2019-01-01 RX ADMIN — HEPARIN 500 UNITS: 100 SYRINGE at 14:15

## 2019-01-01 RX ADMIN — GABAPENTIN 300 MG: 300 CAPSULE ORAL at 08:42

## 2019-01-01 RX ADMIN — CEFUROXIME 1.5 G: 90 INJECTION, POWDER, FOR SOLUTION INTRAVENOUS at 07:27

## 2019-01-01 RX ADMIN — FLUTICASONE PROPIONATE 1 SPRAY: 50 SPRAY, METERED NASAL at 20:38

## 2019-01-01 RX ADMIN — LIDOCAINE HYDROCHLORIDE 50 MG: 10 INJECTION, SOLUTION INFILTRATION; PERINEURAL at 07:33

## 2019-01-01 RX ADMIN — FENTANYL CITRATE 50 MCG: 50 INJECTION, SOLUTION INTRAMUSCULAR; INTRAVENOUS at 10:13

## 2019-01-01 RX ADMIN — GABAPENTIN 600 MG: 300 CAPSULE ORAL at 21:43

## 2019-01-01 RX ADMIN — SENNOSIDES 17.2 MG: 8.6 TABLET, FILM COATED ORAL at 08:55

## 2019-01-01 RX ADMIN — PANTOPRAZOLE SODIUM 40 MG: 40 TABLET, DELAYED RELEASE ORAL at 06:29

## 2019-01-01 RX ADMIN — INSULIN LISPRO 2 UNITS: 100 INJECTION, SOLUTION INTRAVENOUS; SUBCUTANEOUS at 12:43

## 2019-01-01 RX ADMIN — OXYCODONE HYDROCHLORIDE 15 MG: 15 TABLET ORAL at 05:38

## 2019-01-01 RX ADMIN — APIXABAN 5 MG: 5 TABLET, FILM COATED ORAL at 17:35

## 2019-01-01 RX ADMIN — GABAPENTIN 300 MG: 300 CAPSULE ORAL at 08:29

## 2019-01-01 RX ADMIN — SODIUM CHLORIDE 250 ML: 9 INJECTION, SOLUTION INTRAVENOUS at 14:09

## 2019-01-01 RX ADMIN — SODIUM CHLORIDE 250 ML: 9 INJECTION, SOLUTION INTRAVENOUS at 16:47

## 2019-01-01 RX ADMIN — DULOXETINE HYDROCHLORIDE 60 MG: 60 CAPSULE, DELAYED RELEASE ORAL at 08:28

## 2019-01-01 RX ADMIN — LIDOCAINE 1 PATCH: 50 PATCH TOPICAL at 17:53

## 2019-01-01 RX ADMIN — PROCHLORPERAZINE MALEATE 10 MG: 10 TABLET ORAL at 16:46

## 2019-01-01 RX ADMIN — FLUTICASONE PROPIONATE 1 SPRAY: 50 SPRAY, METERED NASAL at 09:22

## 2019-01-01 RX ADMIN — GABAPENTIN 600 MG: 300 CAPSULE ORAL at 20:49

## 2019-01-01 RX ADMIN — PROCHLORPERAZINE MALEATE 10 MG: 10 TABLET ORAL at 13:02

## 2019-01-01 RX ADMIN — INSULIN LISPRO 2 UNITS: 100 INJECTION, SOLUTION INTRAVENOUS; SUBCUTANEOUS at 12:21

## 2019-01-01 RX ADMIN — PANTOPRAZOLE SODIUM 40 MG: 40 TABLET, DELAYED RELEASE ORAL at 05:32

## 2019-01-01 RX ADMIN — FLUTICASONE PROPIONATE 1 SPRAY: 50 SPRAY, METERED NASAL at 08:33

## 2019-01-01 RX ADMIN — IOPAMIDOL 60 ML: 612 INJECTION, SOLUTION INTRAVENOUS at 16:25

## 2019-01-01 RX ADMIN — SODIUM CHLORIDE, PRESERVATIVE FREE 10 ML: 5 INJECTION INTRAVENOUS at 20:15

## 2019-01-01 RX ADMIN — SODIUM CHLORIDE 150 MG: 9 INJECTION, SOLUTION INTRAVENOUS at 16:27

## 2019-01-01 RX ADMIN — INSULIN LISPRO 3 UNITS: 100 INJECTION, SOLUTION INTRAVENOUS; SUBCUTANEOUS at 17:47

## 2019-01-01 RX ADMIN — ACETAMINOPHEN 650 MG: 325 TABLET ORAL at 16:16

## 2019-01-01 RX ADMIN — POLYETHYLENE GLYCOL 3350 17 G: 17 POWDER, FOR SOLUTION ORAL at 20:50

## 2019-01-01 RX ADMIN — OXYCODONE HYDROCHLORIDE 15 MG: 15 TABLET ORAL at 09:41

## 2019-01-01 RX ADMIN — FLUTICASONE PROPIONATE 1 SPRAY: 50 SPRAY, METERED NASAL at 10:47

## 2019-01-01 RX ADMIN — SODIUM CHLORIDE 200 MG: 9 INJECTION, SOLUTION INTRAVENOUS at 13:51

## 2019-01-01 RX ADMIN — ACETAMINOPHEN 650 MG: 325 TABLET ORAL at 16:46

## 2019-01-01 RX ADMIN — HYDROMORPHONE HYDROCHLORIDE 0.5 MG: 1 INJECTION, SOLUTION INTRAMUSCULAR; INTRAVENOUS; SUBCUTANEOUS at 11:16

## 2019-01-01 RX ADMIN — ATORVASTATIN CALCIUM 20 MG: 20 TABLET, FILM COATED ORAL at 21:04

## 2019-01-01 RX ADMIN — OXYCODONE HYDROCHLORIDE 15 MG: 15 TABLET ORAL at 18:31

## 2019-01-01 RX ADMIN — PANTOPRAZOLE SODIUM 40 MG: 40 TABLET, DELAYED RELEASE ORAL at 05:21

## 2019-01-01 RX ADMIN — PROCHLORPERAZINE MALEATE 10 MG: 10 TABLET ORAL at 13:31

## 2019-01-01 RX ADMIN — ATORVASTATIN CALCIUM 20 MG: 20 TABLET, FILM COATED ORAL at 20:50

## 2019-01-01 RX ADMIN — LORAZEPAM 1 MG: 2 INJECTION INTRAMUSCULAR; INTRAVENOUS at 11:37

## 2019-01-01 RX ADMIN — METFORMIN HYDROCHLORIDE 1000 MG: 500 TABLET, EXTENDED RELEASE ORAL at 08:55

## 2019-01-01 RX ADMIN — POLYETHYLENE GLYCOL 3350 17 G: 17 POWDER, FOR SOLUTION ORAL at 21:06

## 2019-01-01 RX ADMIN — ASPIRIN 325 MG ORAL TABLET 325 MG: 325 PILL ORAL at 05:17

## 2019-01-01 RX ADMIN — POLYETHYLENE GLYCOL 3350 17 G: 17 POWDER, FOR SOLUTION ORAL at 08:32

## 2019-01-01 RX ADMIN — SODIUM CHLORIDE 200 MG: 9 INJECTION, SOLUTION INTRAVENOUS at 15:00

## 2019-01-01 RX ADMIN — DULOXETINE HYDROCHLORIDE 60 MG: 60 CAPSULE, DELAYED RELEASE ORAL at 08:42

## 2019-01-01 RX ADMIN — GABAPENTIN 600 MG: 300 CAPSULE ORAL at 16:24

## 2019-01-01 RX ADMIN — LIDOCAINE 2 PATCH: 50 PATCH TOPICAL at 17:36

## 2019-01-01 RX ADMIN — PROCHLORPERAZINE MALEATE 10 MG: 10 TABLET ORAL at 13:07

## 2019-01-01 RX ADMIN — OXYCODONE HYDROCHLORIDE 10 MG: 5 TABLET ORAL at 12:37

## 2019-01-01 RX ADMIN — SENNOSIDES 17.2 MG: 8.6 TABLET, FILM COATED ORAL at 20:39

## 2019-01-01 RX ADMIN — SODIUM CHLORIDE 75 ML/HR: 9 INJECTION, SOLUTION INTRAVENOUS at 05:09

## 2019-01-01 RX ADMIN — GABAPENTIN 600 MG: 300 CAPSULE ORAL at 09:09

## 2019-01-01 RX ADMIN — SODIUM CHLORIDE 200 MG: 9 INJECTION, SOLUTION INTRAVENOUS at 18:30

## 2019-01-01 RX ADMIN — HEPARIN 500 UNITS: 100 SYRINGE at 17:30

## 2019-01-01 RX ADMIN — SODIUM CHLORIDE, PRESERVATIVE FREE 10 ML: 5 INJECTION INTRAVENOUS at 11:18

## 2019-01-01 RX ADMIN — SODIUM CHLORIDE, PRESERVATIVE FREE 10 ML: 5 INJECTION INTRAVENOUS at 14:18

## 2019-01-01 RX ADMIN — SODIUM CHLORIDE, PRESERVATIVE FREE 10 ML: 5 INJECTION INTRAVENOUS at 08:30

## 2019-01-01 RX ADMIN — ATEZOLIZUMAB 1200 MG: 1200 INJECTION, SOLUTION INTRAVENOUS at 12:00

## 2019-01-01 RX ADMIN — OXYCODONE HYDROCHLORIDE 15 MG: 15 TABLET ORAL at 11:49

## 2019-01-01 RX ADMIN — CARBOPLATIN 520 MG: 10 INJECTION, SOLUTION INTRAVENOUS at 13:51

## 2019-01-01 RX ADMIN — SODIUM CHLORIDE 200 MG: 9 INJECTION, SOLUTION INTRAVENOUS at 13:34

## 2019-01-01 RX ADMIN — ACETAMINOPHEN 650 MG: 325 TABLET ORAL at 10:41

## 2019-01-01 RX ADMIN — SENNOSIDES 17.2 MG: 8.6 TABLET, FILM COATED ORAL at 09:09

## 2019-01-01 RX ADMIN — Medication 20 ML: at 16:34

## 2019-01-01 RX ADMIN — INSULIN LISPRO 4 UNITS: 100 INJECTION, SOLUTION INTRAVENOUS; SUBCUTANEOUS at 20:38

## 2019-01-01 RX ADMIN — IOPAMIDOL 80 ML: 755 INJECTION, SOLUTION INTRAVENOUS at 22:55

## 2019-01-01 RX ADMIN — SODIUM CHLORIDE 150 MG: 9 INJECTION, SOLUTION INTRAVENOUS at 13:19

## 2019-01-01 RX ADMIN — GABAPENTIN 600 MG: 300 CAPSULE ORAL at 08:55

## 2019-01-01 RX ADMIN — PROPOFOL 150 MG: 10 INJECTION, EMULSION INTRAVENOUS at 10:13

## 2019-01-01 RX ADMIN — OXYCODONE HYDROCHLORIDE 10 MG: 5 TABLET ORAL at 03:00

## 2019-01-01 RX ADMIN — OXYCODONE HYDROCHLORIDE 15 MG: 15 TABLET ORAL at 17:30

## 2019-01-01 RX ADMIN — SODIUM CHLORIDE 250 ML: 9 INJECTION, SOLUTION INTRAVENOUS at 17:37

## 2019-01-01 RX ADMIN — HEPARIN 500 UNITS: 100 SYRINGE at 16:03

## 2019-01-01 RX ADMIN — FLUTICASONE PROPIONATE 1 SPRAY: 50 SPRAY, METERED NASAL at 08:32

## 2019-01-01 RX ADMIN — SODIUM CHLORIDE, PRESERVATIVE FREE 10 ML: 5 INJECTION INTRAVENOUS at 15:26

## 2019-01-01 RX ADMIN — SENNOSIDES AND DOCUSATE SODIUM 2 TABLET: 8.6; 5 TABLET ORAL at 11:48

## 2019-01-01 RX ADMIN — EPHEDRINE SULFATE 15 MG: 50 INJECTION INTRAMUSCULAR; INTRAVENOUS; SUBCUTANEOUS at 10:29

## 2019-01-01 RX ADMIN — GABAPENTIN 600 MG: 300 CAPSULE ORAL at 15:32

## 2019-01-01 RX ADMIN — HEPARIN 500 UNITS: 100 SYRINGE at 14:57

## 2019-01-01 RX ADMIN — GLYCOPYRROLATE 0.4 MG: 0.2 INJECTION, SOLUTION INTRAMUSCULAR; INTRAVENOUS at 11:03

## 2019-01-01 RX ADMIN — DILTIAZEM HYDROCHLORIDE 120 MG: 120 CAPSULE, COATED, EXTENDED RELEASE ORAL at 07:44

## 2019-01-01 RX ADMIN — ACETAMINOPHEN 650 MG: 325 TABLET ORAL at 16:24

## 2019-01-01 RX ADMIN — FLUTICASONE PROPIONATE 1 SPRAY: 50 SPRAY, METERED NASAL at 21:34

## 2019-01-01 RX ADMIN — DEXAMETHASONE SODIUM PHOSPHATE 12 MG: 4 INJECTION, SOLUTION INTRAMUSCULAR; INTRAVENOUS at 13:19

## 2019-01-01 RX ADMIN — AMLODIPINE BESYLATE 5 MG: 5 TABLET ORAL at 08:32

## 2019-01-01 RX ADMIN — OXYCODONE HYDROCHLORIDE 15 MG: 15 TABLET ORAL at 20:19

## 2019-01-01 RX ADMIN — ATEZOLIZUMAB 1200 MG: 1200 INJECTION, SOLUTION INTRAVENOUS at 12:13

## 2019-01-01 RX ADMIN — DULOXETINE HYDROCHLORIDE 60 MG: 60 CAPSULE, DELAYED RELEASE ORAL at 09:21

## 2019-01-01 RX ADMIN — INSULIN LISPRO 4 UNITS: 100 INJECTION, SOLUTION INTRAVENOUS; SUBCUTANEOUS at 12:53

## 2019-01-01 RX ADMIN — POLYETHYLENE GLYCOL 3350 17 G: 17 POWDER, FOR SOLUTION ORAL at 20:38

## 2019-01-01 RX ADMIN — INSULIN LISPRO 2 UNITS: 100 INJECTION, SOLUTION INTRAVENOUS; SUBCUTANEOUS at 08:42

## 2019-01-01 RX ADMIN — GABAPENTIN 300 MG: 300 CAPSULE ORAL at 17:37

## 2019-01-01 RX ADMIN — SODIUM CHLORIDE 1000 ML: 9 INJECTION, SOLUTION INTRAVENOUS at 21:17

## 2019-01-01 RX ADMIN — ATORVASTATIN CALCIUM 20 MG: 20 TABLET, FILM COATED ORAL at 20:19

## 2019-01-01 RX ADMIN — GABAPENTIN 600 MG: 300 CAPSULE ORAL at 17:52

## 2019-01-01 RX ADMIN — DULOXETINE HYDROCHLORIDE 60 MG: 60 CAPSULE, DELAYED RELEASE ORAL at 10:42

## 2019-01-01 RX ADMIN — LIDOCAINE HYDROCHLORIDE 50 MG: 10 INJECTION, SOLUTION INFILTRATION; PERINEURAL at 10:13

## 2019-01-01 RX ADMIN — PROCHLORPERAZINE MALEATE 10 MG: 10 TABLET ORAL at 13:09

## 2019-01-01 RX ADMIN — GABAPENTIN 600 MG: 300 CAPSULE ORAL at 16:16

## 2019-01-01 RX ADMIN — MORPHINE SULFATE 45 MG: 30 TABLET, FILM COATED, EXTENDED RELEASE ORAL at 09:06

## 2019-01-01 RX ADMIN — OXYCODONE HYDROCHLORIDE 15 MG: 15 TABLET ORAL at 13:31

## 2019-01-01 RX ADMIN — INSULIN LISPRO 6 UNITS: 100 INJECTION, SOLUTION INTRAVENOUS; SUBCUTANEOUS at 12:11

## 2019-01-01 RX ADMIN — DEXAMETHASONE SODIUM PHOSPHATE 12 MG: 4 INJECTION, SOLUTION INTRAMUSCULAR; INTRAVENOUS at 14:06

## 2019-01-01 RX ADMIN — SODIUM CHLORIDE 200 MG: 9 INJECTION, SOLUTION INTRAVENOUS at 17:36

## 2019-01-01 RX ADMIN — HYDROMORPHONE HYDROCHLORIDE 0.5 MG: 1 INJECTION, SOLUTION INTRAMUSCULAR; INTRAVENOUS; SUBCUTANEOUS at 13:20

## 2019-01-01 RX ADMIN — ATEZOLIZUMAB 1200 MG: 1200 INJECTION, SOLUTION INTRAVENOUS at 13:26

## 2019-01-01 RX ADMIN — PROPOFOL 100 MCG/KG/MIN: 10 INJECTION, EMULSION INTRAVENOUS at 07:33

## 2019-01-01 RX ADMIN — SODIUM CHLORIDE 150 MG: 9 INJECTION, SOLUTION INTRAVENOUS at 14:04

## 2019-01-01 RX ADMIN — INSULIN DETEMIR 12 UNITS: 100 INJECTION, SOLUTION SUBCUTANEOUS at 14:07

## 2019-01-01 RX ADMIN — DULOXETINE HYDROCHLORIDE 60 MG: 60 CAPSULE, DELAYED RELEASE ORAL at 07:46

## 2019-01-01 RX ADMIN — OXYCODONE HYDROCHLORIDE 15 MG: 15 TABLET ORAL at 10:39

## 2019-01-01 RX ADMIN — GABAPENTIN 600 MG: 300 CAPSULE ORAL at 11:16

## 2019-01-01 RX ADMIN — INSULIN LISPRO 4 UNITS: 100 INJECTION, SOLUTION INTRAVENOUS; SUBCUTANEOUS at 09:07

## 2019-01-01 RX ADMIN — ENOXAPARIN SODIUM 40 MG: 40 INJECTION SUBCUTANEOUS at 18:56

## 2019-01-01 RX ADMIN — METFORMIN HYDROCHLORIDE 1000 MG: 500 TABLET, EXTENDED RELEASE ORAL at 08:42

## 2019-01-01 RX ADMIN — ATORVASTATIN CALCIUM 20 MG: 20 TABLET, FILM COATED ORAL at 21:17

## 2019-01-01 RX ADMIN — ONDANSETRON 4 MG: 2 INJECTION INTRAMUSCULAR; INTRAVENOUS at 11:03

## 2019-01-01 RX ADMIN — SODIUM CHLORIDE, POTASSIUM CHLORIDE, SODIUM LACTATE AND CALCIUM CHLORIDE: 600; 310; 30; 20 INJECTION, SOLUTION INTRAVENOUS at 10:05

## 2019-01-01 RX ADMIN — HEPARIN 500 UNITS: 100 SYRINGE at 14:37

## 2019-01-01 RX ADMIN — SODIUM CHLORIDE 200 MG: 9 INJECTION, SOLUTION INTRAVENOUS at 13:15

## 2019-01-01 RX ADMIN — DULOXETINE HYDROCHLORIDE 60 MG: 60 CAPSULE, DELAYED RELEASE ORAL at 08:32

## 2019-01-01 RX ADMIN — INSULIN DETEMIR 5 UNITS: 100 INJECTION, SOLUTION SUBCUTANEOUS at 21:38

## 2019-01-01 RX ADMIN — LIDOCAINE 1 PATCH: 50 PATCH TOPICAL at 18:56

## 2019-01-01 RX ADMIN — POLYETHYLENE GLYCOL 3350 17 G: 17 POWDER, FOR SOLUTION ORAL at 20:19

## 2019-01-01 RX ADMIN — LIDOCAINE 2 PATCH: 50 PATCH TOPICAL at 12:20

## 2019-01-01 RX ADMIN — ATORVASTATIN CALCIUM 20 MG: 20 TABLET, FILM COATED ORAL at 20:39

## 2019-01-01 RX ADMIN — MORPHINE SULFATE 45 MG: 30 TABLET, FILM COATED, EXTENDED RELEASE ORAL at 09:05

## 2019-01-01 RX ADMIN — INSULIN LISPRO 2 UNITS: 100 INJECTION, SOLUTION INTRAVENOUS; SUBCUTANEOUS at 20:19

## 2019-01-01 RX ADMIN — FENTANYL CITRATE 100 MCG: 50 INJECTION, SOLUTION INTRAMUSCULAR; INTRAVENOUS at 07:33

## 2019-01-01 RX ADMIN — OXYCODONE HYDROCHLORIDE 15 MG: 15 TABLET ORAL at 23:40

## 2019-01-01 RX ADMIN — HYDROMORPHONE HYDROCHLORIDE 0.5 MG: 1 INJECTION, SOLUTION INTRAMUSCULAR; INTRAVENOUS; SUBCUTANEOUS at 23:53

## 2019-01-01 RX ADMIN — INSULIN DETEMIR 5 UNITS: 100 INJECTION, SOLUTION SUBCUTANEOUS at 20:41

## 2019-01-01 RX ADMIN — SENNOSIDES 17.2 MG: 8.6 TABLET, FILM COATED ORAL at 21:17

## 2019-01-01 RX ADMIN — SODIUM CHLORIDE 200 MG: 9 INJECTION, SOLUTION INTRAVENOUS at 15:56

## 2019-01-01 RX ADMIN — GABAPENTIN 300 MG: 300 CAPSULE ORAL at 08:02

## 2019-01-01 RX ADMIN — PROCHLORPERAZINE MALEATE 10 MG: 10 TABLET ORAL at 12:58

## 2019-01-01 RX ADMIN — INSULIN LISPRO 4 UNITS: 100 INJECTION, SOLUTION INTRAVENOUS; SUBCUTANEOUS at 21:35

## 2019-01-01 RX ADMIN — OXYCODONE HYDROCHLORIDE 10 MG: 5 TABLET ORAL at 21:06

## 2019-01-01 RX ADMIN — OXYCODONE HYDROCHLORIDE 10 MG: 5 TABLET ORAL at 16:36

## 2019-01-01 RX ADMIN — PANTOPRAZOLE SODIUM 40 MG: 40 TABLET, DELAYED RELEASE ORAL at 05:27

## 2019-01-01 RX ADMIN — METFORMIN HYDROCHLORIDE 1000 MG: 500 TABLET, EXTENDED RELEASE ORAL at 17:20

## 2019-01-01 RX ADMIN — HYDROMORPHONE HYDROCHLORIDE 0.5 MG: 1 INJECTION, SOLUTION INTRAMUSCULAR; INTRAVENOUS; SUBCUTANEOUS at 07:43

## 2019-01-01 RX ADMIN — GABAPENTIN 600 MG: 300 CAPSULE ORAL at 20:39

## 2019-01-01 RX ADMIN — INSULIN LISPRO 4 UNITS: 100 INJECTION, SOLUTION INTRAVENOUS; SUBCUTANEOUS at 21:05

## 2019-01-01 RX ADMIN — INSULIN LISPRO 8 UNITS: 100 INJECTION, SOLUTION INTRAVENOUS; SUBCUTANEOUS at 13:33

## 2019-01-01 RX ADMIN — INSULIN LISPRO 2 UNITS: 100 INJECTION, SOLUTION INTRAVENOUS; SUBCUTANEOUS at 20:48

## 2019-01-01 RX ADMIN — ONDANSETRON 16 MG: 2 INJECTION INTRAMUSCULAR; INTRAVENOUS at 16:34

## 2019-01-01 RX ADMIN — MELATONIN TAB 5 MG 5 MG: 5 TAB at 02:33

## 2019-01-01 RX ADMIN — SODIUM CHLORIDE, PRESERVATIVE FREE 10 ML: 5 INJECTION INTRAVENOUS at 20:19

## 2019-01-01 RX ADMIN — ACETAMINOPHEN 650 MG: 325 TABLET ORAL at 06:22

## 2019-01-01 RX ADMIN — POLYETHYLENE GLYCOL 3350 17 G: 17 POWDER, FOR SOLUTION ORAL at 08:41

## 2019-01-01 RX ADMIN — ALBUTEROL SULFATE 4 PUFF: 90 AEROSOL, METERED RESPIRATORY (INHALATION) at 14:36

## 2019-01-01 RX ADMIN — PEGFILGRASTIM 6 MG: KIT SUBCUTANEOUS at 15:11

## 2019-01-01 RX ADMIN — GABAPENTIN 600 MG: 300 CAPSULE ORAL at 08:32

## 2019-01-01 RX ADMIN — SODIUM CHLORIDE, PRESERVATIVE FREE 10 ML: 5 INJECTION INTRAVENOUS at 14:15

## 2019-01-01 RX ADMIN — INSULIN LISPRO 2 UNITS: 100 INJECTION, SOLUTION INTRAVENOUS; SUBCUTANEOUS at 13:25

## 2019-01-01 RX ADMIN — OXYCODONE HYDROCHLORIDE 10 MG: 5 TABLET ORAL at 09:05

## 2019-01-01 RX ADMIN — SENNOSIDES AND DOCUSATE SODIUM 2 TABLET: 8.6; 5 TABLET ORAL at 08:32

## 2019-01-01 RX ADMIN — ALUMINUM HYDROXIDE, MAGNESIUM HYDROXIDE, AND DIMETHICONE 15 ML: 400; 400; 40 SUSPENSION ORAL at 00:45

## 2019-01-01 RX ADMIN — PALONOSETRON 0.25 MG: 0.25 INJECTION, SOLUTION INTRAVENOUS at 14:39

## 2019-01-01 RX ADMIN — GABAPENTIN 300 MG: 300 CAPSULE ORAL at 16:20

## 2019-01-01 RX ADMIN — ONDANSETRON 4 MG: 2 INJECTION INTRAMUSCULAR; INTRAVENOUS at 21:18

## 2019-01-01 RX ADMIN — GABAPENTIN 600 MG: 300 CAPSULE ORAL at 17:30

## 2019-01-01 RX ADMIN — FLUTICASONE PROPIONATE 1 SPRAY: 50 SPRAY, METERED NASAL at 07:45

## 2019-01-01 RX ADMIN — INSULIN LISPRO 6 UNITS: 100 INJECTION, SOLUTION INTRAVENOUS; SUBCUTANEOUS at 09:22

## 2019-01-01 RX ADMIN — METFORMIN HYDROCHLORIDE 1000 MG: 500 TABLET, EXTENDED RELEASE ORAL at 17:18

## 2019-01-01 RX ADMIN — DULOXETINE HYDROCHLORIDE 60 MG: 60 CAPSULE, DELAYED RELEASE ORAL at 11:15

## 2019-01-01 RX ADMIN — ATORVASTATIN CALCIUM 20 MG: 20 TABLET, FILM COATED ORAL at 21:35

## 2019-01-01 RX ADMIN — INSULIN DETEMIR 12 UNITS: 100 INJECTION, SOLUTION SUBCUTANEOUS at 09:07

## 2019-01-01 RX ADMIN — PEGFILGRASTIM 6 MG: KIT SUBCUTANEOUS at 14:41

## 2019-01-01 RX ADMIN — CARBOPLATIN 630 MG: 10 INJECTION, SOLUTION INTRAVENOUS at 15:15

## 2019-01-01 RX ADMIN — OXYCODONE HYDROCHLORIDE 15 MG: 15 TABLET ORAL at 05:42

## 2019-01-01 RX ADMIN — INSULIN LISPRO 4 UNITS: 100 INJECTION, SOLUTION INTRAVENOUS; SUBCUTANEOUS at 21:06

## 2019-01-01 RX ADMIN — LIDOCAINE 1 PATCH: 50 PATCH TOPICAL at 17:18

## 2019-01-01 RX ADMIN — ESMOLOL HYDROCHLORIDE 30 MG: 10 INJECTION, SOLUTION INTRAVENOUS at 10:19

## 2019-01-01 RX ADMIN — OXYCODONE HYDROCHLORIDE 15 MG: 15 TABLET ORAL at 02:10

## 2019-01-01 RX ADMIN — SODIUM CHLORIDE, PRESERVATIVE FREE 10 ML: 5 INJECTION INTRAVENOUS at 08:58

## 2019-01-01 RX ADMIN — FLUTICASONE PROPIONATE 1 SPRAY: 50 SPRAY, METERED NASAL at 21:17

## 2019-01-01 RX ADMIN — SODIUM CHLORIDE 200 MG: 9 INJECTION, SOLUTION INTRAVENOUS at 13:11

## 2019-01-01 RX ADMIN — SODIUM CHLORIDE, PRESERVATIVE FREE 10 ML: 5 INJECTION INTRAVENOUS at 09:22

## 2019-01-01 RX ADMIN — SODIUM CHLORIDE, PRESERVATIVE FREE 10 ML: 5 INJECTION INTRAVENOUS at 08:41

## 2019-01-01 RX ADMIN — METFORMIN HYDROCHLORIDE 1000 MG: 500 TABLET, EXTENDED RELEASE ORAL at 17:46

## 2019-01-01 RX ADMIN — INSULIN LISPRO 4 UNITS: 100 INJECTION, SOLUTION INTRAVENOUS; SUBCUTANEOUS at 17:46

## 2019-01-01 RX ADMIN — OXYCODONE HYDROCHLORIDE 15 MG: 15 TABLET ORAL at 00:08

## 2019-01-01 RX ADMIN — PROCHLORPERAZINE MALEATE 10 MG: 10 TABLET ORAL at 16:16

## 2019-01-01 RX ADMIN — SENNOSIDES AND DOCUSATE SODIUM 2 TABLET: 8.6; 5 TABLET ORAL at 20:19

## 2019-01-01 RX ADMIN — METFORMIN HYDROCHLORIDE 1000 MG: 500 TABLET, EXTENDED RELEASE ORAL at 08:32

## 2019-01-01 RX ADMIN — AMLODIPINE BESYLATE 5 MG: 5 TABLET ORAL at 09:10

## 2019-01-01 RX ADMIN — ATEZOLIZUMAB 1200 MG: 1200 INJECTION, SOLUTION INTRAVENOUS at 14:43

## 2019-01-01 RX ADMIN — ACETAMINOPHEN 650 MG: 325 TABLET ORAL at 11:15

## 2019-01-01 RX ADMIN — POLYETHYLENE GLYCOL 3350 17 G: 17 POWDER, FOR SOLUTION ORAL at 08:55

## 2019-01-01 RX ADMIN — SODIUM CHLORIDE, PRESERVATIVE FREE 10 ML: 5 INJECTION INTRAVENOUS at 20:51

## 2019-01-01 RX ADMIN — SODIUM CHLORIDE, POTASSIUM CHLORIDE, SODIUM LACTATE AND CALCIUM CHLORIDE: 600; 310; 30; 20 INJECTION, SOLUTION INTRAVENOUS at 07:27

## 2019-01-01 RX ADMIN — OXYCODONE HYDROCHLORIDE 15 MG: 15 TABLET ORAL at 09:20

## 2019-01-01 RX ADMIN — HYDROMORPHONE HYDROCHLORIDE 0.5 MG: 1 INJECTION, SOLUTION INTRAMUSCULAR; INTRAVENOUS; SUBCUTANEOUS at 03:00

## 2019-01-01 RX ADMIN — PEGFILGRASTIM 6 MG: KIT SUBCUTANEOUS at 14:40

## 2019-01-01 RX ADMIN — SODIUM CHLORIDE 100 ML/HR: 9 INJECTION, SOLUTION INTRAVENOUS at 03:23

## 2019-01-01 RX ADMIN — MAGNESIUM HYDROXIDE 10 ML: 2400 SUSPENSION ORAL at 15:33

## 2019-01-01 RX ADMIN — DULOXETINE HYDROCHLORIDE 60 MG: 60 CAPSULE, DELAYED RELEASE ORAL at 09:09

## 2019-01-01 RX ADMIN — OXYCODONE HYDROCHLORIDE 15 MG: 15 TABLET ORAL at 04:01

## 2019-01-01 RX ADMIN — SODIUM CHLORIDE 75 ML/HR: 9 INJECTION, SOLUTION INTRAVENOUS at 03:00

## 2019-01-01 RX ADMIN — ENOXAPARIN SODIUM 40 MG: 40 INJECTION SUBCUTANEOUS at 17:35

## 2019-01-01 RX ADMIN — PEGFILGRASTIM-CBQV 6 MG: 6 INJECTION, SOLUTION SUBCUTANEOUS at 10:55

## 2019-01-01 RX ADMIN — GABAPENTIN 300 MG: 300 CAPSULE ORAL at 09:22

## 2019-01-01 RX ADMIN — ALTEPLASE 6.12 MG: KIT at 02:44

## 2019-01-01 RX ADMIN — SODIUM CHLORIDE 150 MG: 9 INJECTION, SOLUTION INTRAVENOUS at 14:18

## 2019-01-01 RX ADMIN — SENNOSIDES AND DOCUSATE SODIUM 2 TABLET: 8.6; 5 TABLET ORAL at 20:50

## 2019-01-01 RX ADMIN — DEXAMETHASONE SODIUM PHOSPHATE 12 MG: 4 INJECTION, SOLUTION INTRAMUSCULAR; INTRAVENOUS at 17:05

## 2019-01-01 RX ADMIN — SODIUM CHLORIDE, PRESERVATIVE FREE 10 ML: 5 INJECTION INTRAVENOUS at 14:57

## 2019-01-01 RX ADMIN — OXYCODONE HYDROCHLORIDE 15 MG: 15 TABLET ORAL at 15:32

## 2019-01-01 RX ADMIN — INSULIN LISPRO 2 UNITS: 100 INJECTION, SOLUTION INTRAVENOUS; SUBCUTANEOUS at 08:29

## 2019-01-01 RX ADMIN — AMLODIPINE BESYLATE 5 MG: 5 TABLET ORAL at 08:29

## 2019-01-01 RX ADMIN — FLUTICASONE PROPIONATE 1 SPRAY: 50 SPRAY, METERED NASAL at 20:19

## 2019-01-01 RX ADMIN — METFORMIN HYDROCHLORIDE 1000 MG: 500 TABLET, EXTENDED RELEASE ORAL at 17:52

## 2019-01-01 RX ADMIN — POLYETHYLENE GLYCOL 3350 17 G: 17 POWDER, FOR SOLUTION ORAL at 21:35

## 2019-01-01 RX ADMIN — SODIUM CHLORIDE 250 ML: 9 INJECTION, SOLUTION INTRAVENOUS at 11:55

## 2019-01-01 RX ADMIN — HEPARIN 500 UNITS: 100 SYRINGE at 15:09

## 2019-01-01 RX ADMIN — METFORMIN HYDROCHLORIDE 1000 MG: 500 TABLET, EXTENDED RELEASE ORAL at 11:48

## 2019-01-01 RX ADMIN — GABAPENTIN 600 MG: 300 CAPSULE ORAL at 21:17

## 2019-01-01 RX ADMIN — FLUTICASONE PROPIONATE 1 SPRAY: 50 SPRAY, METERED NASAL at 08:42

## 2019-01-01 RX ADMIN — OXYCODONE HYDROCHLORIDE 15 MG: 15 TABLET ORAL at 23:13

## 2019-01-01 RX ADMIN — SODIUM CHLORIDE 200 MG: 9 INJECTION, SOLUTION INTRAVENOUS at 13:22

## 2019-01-01 RX ADMIN — CALCIUM CARBONATE 750 MG: 750 TABLET ORAL at 21:06

## 2019-01-01 RX ADMIN — SODIUM CHLORIDE, PRESERVATIVE FREE 10 ML: 5 INJECTION INTRAVENOUS at 21:37

## 2019-01-01 RX ADMIN — HEPARIN SODIUM (PORCINE) LOCK FLUSH IV SOLN 100 UNIT/ML 500 UNITS: 100 SOLUTION at 15:26

## 2019-01-01 RX ADMIN — OXYCODONE HYDROCHLORIDE 15 MG: 15 TABLET ORAL at 12:43

## 2019-01-01 RX ADMIN — OXYCODONE HYDROCHLORIDE 10 MG: 5 TABLET ORAL at 17:46

## 2019-01-01 RX ADMIN — SENNOSIDES 17.2 MG: 8.6 TABLET, FILM COATED ORAL at 21:35

## 2019-01-01 RX ADMIN — GABAPENTIN 300 MG: 300 CAPSULE ORAL at 20:15

## 2019-01-01 RX ADMIN — INSULIN LISPRO 4 UNITS: 100 INJECTION, SOLUTION INTRAVENOUS; SUBCUTANEOUS at 17:20

## 2019-01-01 RX ADMIN — FLUTICASONE PROPIONATE 1 SPRAY: 50 SPRAY, METERED NASAL at 11:23

## 2019-01-01 RX ADMIN — MORPHINE SULFATE 45 MG: 30 TABLET, FILM COATED, EXTENDED RELEASE ORAL at 21:06

## 2019-01-01 RX ADMIN — HEPARIN 500 UNITS: 100 SYRINGE at 14:18

## 2019-01-01 RX ADMIN — OXYCODONE HYDROCHLORIDE 15 MG: 15 TABLET ORAL at 05:32

## 2019-01-01 RX ADMIN — PANTOPRAZOLE SODIUM 40 MG: 40 TABLET, DELAYED RELEASE ORAL at 05:36

## 2019-01-01 RX ADMIN — HYDROMORPHONE HYDROCHLORIDE 0.5 MG: 1 INJECTION, SOLUTION INTRAMUSCULAR; INTRAVENOUS; SUBCUTANEOUS at 21:19

## 2019-01-01 RX ADMIN — ONDANSETRON 4 MG: 2 INJECTION INTRAMUSCULAR; INTRAVENOUS at 23:52

## 2019-01-01 RX ADMIN — ACETAMINOPHEN 650 MG: 325 TABLET ORAL at 21:17

## 2019-01-01 RX ADMIN — HEPARIN 500 UNITS: 100 SYRINGE at 16:34

## 2019-01-01 RX ADMIN — HYDROMORPHONE HYDROCHLORIDE 0.5 MG: 1 INJECTION, SOLUTION INTRAMUSCULAR; INTRAVENOUS; SUBCUTANEOUS at 09:08

## 2019-01-01 RX ADMIN — FLUTICASONE PROPIONATE 1 SPRAY: 50 SPRAY, METERED NASAL at 08:55

## 2019-01-01 RX ADMIN — INSULIN LISPRO 6 UNITS: 100 INJECTION, SOLUTION INTRAVENOUS; SUBCUTANEOUS at 08:56

## 2019-01-01 RX ADMIN — Medication 10 ML: at 15:09

## 2019-01-01 RX ADMIN — DULOXETINE HYDROCHLORIDE 60 MG: 60 CAPSULE, DELAYED RELEASE ORAL at 08:55

## 2019-01-01 RX ADMIN — SODIUM CHLORIDE, PRESERVATIVE FREE 10 ML: 5 INJECTION INTRAVENOUS at 21:05

## 2019-01-01 RX ADMIN — ACETAMINOPHEN 650 MG: 325 TABLET ORAL at 20:39

## 2019-01-01 RX ADMIN — FUROSEMIDE 20 MG: 10 INJECTION, SOLUTION INTRAMUSCULAR; INTRAVENOUS at 22:05

## 2019-01-01 RX ADMIN — POLYETHYLENE GLYCOL 3350 17 G: 17 POWDER, FOR SOLUTION ORAL at 11:48

## 2019-01-01 RX ADMIN — OXYCODONE HYDROCHLORIDE 10 MG: 5 TABLET ORAL at 09:06

## 2019-01-01 RX ADMIN — INSULIN LISPRO 2 UNITS: 100 INJECTION, SOLUTION INTRAVENOUS; SUBCUTANEOUS at 21:18

## 2019-01-01 RX ADMIN — Medication 26.8 MILLICURIE: at 11:30

## 2019-01-01 RX ADMIN — CARBOPLATIN 520 MG: 10 INJECTION, SOLUTION INTRAVENOUS at 17:48

## 2019-01-01 RX ADMIN — SENNOSIDES 17.2 MG: 8.6 TABLET, FILM COATED ORAL at 21:06

## 2019-01-01 RX ADMIN — GABAPENTIN 300 MG: 300 CAPSULE ORAL at 15:32

## 2019-01-01 RX ADMIN — SODIUM CHLORIDE 250 ML: 9 INJECTION, SOLUTION INTRAVENOUS at 13:50

## 2019-01-01 RX ADMIN — ATORVASTATIN CALCIUM 80 MG: 40 TABLET, FILM COATED ORAL at 21:06

## 2019-01-01 RX ADMIN — GABAPENTIN 600 MG: 300 CAPSULE ORAL at 10:39

## 2019-01-01 RX ADMIN — PALONOSETRON 0.25 MG: 0.25 INJECTION, SOLUTION INTRAVENOUS at 13:02

## 2019-01-01 RX ADMIN — SODIUM CHLORIDE, PRESERVATIVE FREE 10 ML: 5 INJECTION INTRAVENOUS at 21:17

## 2019-01-01 RX ADMIN — IOPAMIDOL 85 ML: 612 INJECTION, SOLUTION INTRAVENOUS at 16:26

## 2019-01-01 RX ADMIN — INSULIN LISPRO 6 UNITS: 100 INJECTION, SOLUTION INTRAVENOUS; SUBCUTANEOUS at 09:14

## 2019-01-01 RX ADMIN — FLUTICASONE PROPIONATE 1 SPRAY: 50 SPRAY, METERED NASAL at 20:15

## 2019-01-01 RX ADMIN — SODIUM CHLORIDE, PRESERVATIVE FREE 10 ML: 5 INJECTION INTRAVENOUS at 21:07

## 2019-01-01 RX ADMIN — Medication 3 MG: at 11:03

## 2019-01-01 RX ADMIN — OXYCODONE HYDROCHLORIDE 15 MG: 15 TABLET ORAL at 13:10

## 2019-01-01 RX ADMIN — OXYCODONE HYDROCHLORIDE 15 MG: 15 TABLET ORAL at 16:04

## 2019-01-01 RX ADMIN — PALONOSETRON 0.25 MG: 0.25 INJECTION, SOLUTION INTRAVENOUS at 11:57

## 2019-01-01 RX ADMIN — AMLODIPINE BESYLATE 5 MG: 5 TABLET ORAL at 08:42

## 2019-01-01 RX ADMIN — ACETAMINOPHEN 650 MG: 325 TABLET ORAL at 13:10

## 2019-01-01 RX ADMIN — GABAPENTIN 300 MG: 300 CAPSULE ORAL at 21:04

## 2019-01-01 RX ADMIN — PROCHLORPERAZINE MALEATE 10 MG: 10 TABLET ORAL at 13:33

## 2019-01-01 RX ADMIN — PANTOPRAZOLE SODIUM 40 MG: 40 TABLET, DELAYED RELEASE ORAL at 05:48

## 2019-01-01 RX ADMIN — ATORVASTATIN CALCIUM 20 MG: 20 TABLET, FILM COATED ORAL at 21:06

## 2019-01-01 RX ADMIN — HEPARIN 500 UNITS: 100 SYRINGE at 14:39

## 2019-01-01 RX ADMIN — SODIUM CHLORIDE 200 MG: 9 INJECTION, SOLUTION INTRAVENOUS at 14:24

## 2019-01-01 RX ADMIN — ALTEPLASE 55.08 MG: KIT at 02:45

## 2019-01-01 RX ADMIN — CARBOPLATIN 570 MG: 10 INJECTION, SOLUTION INTRAVENOUS at 14:26

## 2019-01-01 RX ADMIN — METFORMIN HYDROCHLORIDE 1000 MG: 500 TABLET, EXTENDED RELEASE ORAL at 17:30

## 2019-01-01 RX ADMIN — OXYCODONE HYDROCHLORIDE 10 MG: 5 TABLET ORAL at 09:38

## 2019-01-01 RX ADMIN — SODIUM ZIRCONIUM CYCLOSILICATE 10 G: 10 POWDER, FOR SUSPENSION ORAL at 18:34

## 2019-01-01 RX ADMIN — LIDOCAINE HYDROCHLORIDE 15 ML: 20 SOLUTION ORAL; TOPICAL at 00:45

## 2019-01-01 RX ADMIN — OXYCODONE HYDROCHLORIDE 10 MG: 5 TABLET ORAL at 08:32

## 2019-01-01 RX ADMIN — INSULIN LISPRO 2 UNITS: 100 INJECTION, SOLUTION INTRAVENOUS; SUBCUTANEOUS at 17:46

## 2019-01-01 RX ADMIN — HEPARIN 500 UNITS: 100 SYRINGE at 12:51

## 2019-01-01 RX ADMIN — ACETAMINOPHEN 650 MG: 325 TABLET ORAL at 21:06

## 2019-01-01 RX ADMIN — HYDRALAZINE HYDROCHLORIDE 5 MG: 20 INJECTION INTRAMUSCULAR; INTRAVENOUS at 04:11

## 2019-01-01 RX ADMIN — AMLODIPINE BESYLATE 5 MG: 5 TABLET ORAL at 08:55

## 2019-01-01 RX ADMIN — ACETAMINOPHEN 650 MG: 325 TABLET ORAL at 17:52

## 2019-01-01 RX ADMIN — INSULIN LISPRO 2 UNITS: 100 INJECTION, SOLUTION INTRAVENOUS; SUBCUTANEOUS at 18:54

## 2019-01-01 RX ADMIN — AMLODIPINE BESYLATE 5 MG: 5 TABLET ORAL at 12:38

## 2019-01-01 RX ADMIN — GABAPENTIN 600 MG: 300 CAPSULE ORAL at 21:06

## 2019-01-01 RX ADMIN — INSULIN LISPRO 4 UNITS: 100 INJECTION, SOLUTION INTRAVENOUS; SUBCUTANEOUS at 11:49

## 2019-01-01 RX ADMIN — POLYETHYLENE GLYCOL 3350 17 G: 17 POWDER, FOR SOLUTION ORAL at 21:19

## 2019-01-01 RX ADMIN — SODIUM CHLORIDE 250 ML: 9 INJECTION, SOLUTION INTRAVENOUS at 13:57

## 2019-01-01 RX ADMIN — OXYCODONE HYDROCHLORIDE 15 MG: 15 TABLET ORAL at 14:33

## 2019-01-01 RX ADMIN — SODIUM CHLORIDE 200 MG: 9 INJECTION, SOLUTION INTRAVENOUS at 17:40

## 2019-01-01 RX ADMIN — OXYCODONE HYDROCHLORIDE 15 MG: 15 TABLET ORAL at 03:18

## 2019-01-01 RX ADMIN — FLUTICASONE PROPIONATE 1 SPRAY: 50 SPRAY, METERED NASAL at 20:48

## 2019-01-01 RX ADMIN — SODIUM CHLORIDE, PRESERVATIVE FREE 10 ML: 5 INJECTION INTRAVENOUS at 22:09

## 2019-01-01 RX ADMIN — ACETAMINOPHEN 650 MG: 325 TABLET ORAL at 09:14

## 2019-01-01 RX ADMIN — OXYCODONE HYDROCHLORIDE 10 MG: 5 TABLET ORAL at 16:20

## 2019-01-01 RX ADMIN — PANTOPRAZOLE SODIUM 40 MG: 40 TABLET, DELAYED RELEASE ORAL at 05:42

## 2019-01-01 RX ADMIN — INSULIN LISPRO 4 UNITS: 100 INJECTION, SOLUTION INTRAVENOUS; SUBCUTANEOUS at 13:11

## 2019-01-01 RX ADMIN — AMLODIPINE BESYLATE 5 MG: 5 TABLET ORAL at 09:22

## 2019-01-03 DIAGNOSIS — G89.4 CHRONIC PAIN SYNDROME: ICD-10-CM

## 2019-01-03 DIAGNOSIS — G89.29 OTHER CHRONIC PAIN: ICD-10-CM

## 2019-01-03 RX ORDER — OXYCODONE HYDROCHLORIDE 10 MG/1
10 TABLET ORAL
Qty: 120 TABLET | Refills: 0 | Status: SHIPPED | OUTPATIENT
Start: 2019-01-03 | End: 2019-02-01 | Stop reason: SDUPTHER

## 2019-01-29 DIAGNOSIS — IMO0002 UNCONTROLLED TYPE 2 DIABETES MELLITUS WITH COMPLICATION, WITHOUT LONG-TERM CURRENT USE OF INSULIN: ICD-10-CM

## 2019-01-30 RX ORDER — METFORMIN HYDROCHLORIDE 500 MG/1
TABLET, EXTENDED RELEASE ORAL
Qty: 360 TABLET | Refills: 0 | Status: SHIPPED | OUTPATIENT
Start: 2019-01-30 | End: 2019-01-01 | Stop reason: SDUPTHER

## 2019-02-01 DIAGNOSIS — G89.29 OTHER CHRONIC PAIN: ICD-10-CM

## 2019-02-01 DIAGNOSIS — G89.4 CHRONIC PAIN SYNDROME: ICD-10-CM

## 2019-02-01 RX ORDER — OXYCODONE HYDROCHLORIDE 10 MG/1
10 TABLET ORAL
Qty: 120 TABLET | Refills: 0 | Status: SHIPPED | OUTPATIENT
Start: 2019-02-01 | End: 2019-03-04 | Stop reason: SDUPTHER

## 2019-03-01 RX ORDER — DULOXETIN HYDROCHLORIDE 60 MG/1
60 CAPSULE, DELAYED RELEASE ORAL DAILY
Qty: 90 CAPSULE | Refills: 3 | Status: SHIPPED | OUTPATIENT
Start: 2019-03-01 | End: 2019-01-01

## 2019-03-04 DIAGNOSIS — G89.4 CHRONIC PAIN SYNDROME: ICD-10-CM

## 2019-03-04 DIAGNOSIS — G89.29 OTHER CHRONIC PAIN: ICD-10-CM

## 2019-03-04 RX ORDER — OXYCODONE HYDROCHLORIDE 10 MG/1
10 TABLET ORAL
Qty: 120 TABLET | Refills: 0 | Status: SHIPPED | OUTPATIENT
Start: 2019-03-04 | End: 2019-03-31 | Stop reason: SDUPTHER

## 2019-03-04 RX ORDER — OXYCODONE HYDROCHLORIDE 10 MG/1
10 TABLET ORAL
Qty: 120 TABLET | Refills: 0 | Status: SHIPPED | OUTPATIENT
Start: 2019-03-04 | End: 2019-03-04 | Stop reason: SDUPTHER

## 2019-03-22 RX ORDER — AMOXICILLIN AND CLAVULANATE POTASSIUM 875; 125 MG/1; MG/1
1 TABLET, FILM COATED ORAL 2 TIMES DAILY
Qty: 28 TABLET | Refills: 0 | Status: SHIPPED | OUTPATIENT
Start: 2019-03-22 | End: 2019-04-02

## 2019-03-31 DIAGNOSIS — G89.29 OTHER CHRONIC PAIN: ICD-10-CM

## 2019-03-31 DIAGNOSIS — G89.4 CHRONIC PAIN SYNDROME: ICD-10-CM

## 2019-03-31 RX ORDER — OXYCODONE HYDROCHLORIDE 10 MG/1
10 TABLET ORAL
Qty: 120 TABLET | Refills: 0 | Status: SHIPPED | OUTPATIENT
Start: 2019-03-31 | End: 2019-01-01 | Stop reason: SDUPTHER

## 2019-04-01 DIAGNOSIS — I25.10 CHRONIC CORONARY ARTERY DISEASE: Primary | ICD-10-CM

## 2019-04-01 DIAGNOSIS — E11.65 UNCONTROLLED TYPE 2 DIABETES MELLITUS WITH HYPERGLYCEMIA (HCC): ICD-10-CM

## 2019-04-01 DIAGNOSIS — E78.49 OTHER HYPERLIPIDEMIA: ICD-10-CM

## 2019-04-01 RX ORDER — FLUTICASONE PROPIONATE 50 MCG
1 SPRAY, SUSPENSION (ML) NASAL 2 TIMES DAILY
Qty: 3 BOTTLE | Refills: 3 | Status: SHIPPED | OUTPATIENT
Start: 2019-04-01

## 2019-04-02 ENCOUNTER — APPOINTMENT (OUTPATIENT)
Dept: LAB | Facility: HOSPITAL | Age: 69
End: 2019-04-02

## 2019-04-02 LAB
ALBUMIN SERPL-MCNC: 4.58 G/DL (ref 3.2–4.8)
ALBUMIN/GLOB SERPL: 1.9 G/DL (ref 1.5–2.5)
ALP SERPL-CCNC: 130 U/L (ref 25–100)
ALT SERPL W P-5'-P-CCNC: 17 U/L (ref 7–40)
ANION GAP SERPL CALCULATED.3IONS-SCNC: 8 MMOL/L (ref 3–11)
ARTICHOKE IGE QN: 112 MG/DL (ref 0–130)
AST SERPL-CCNC: 15 U/L (ref 0–33)
BILIRUB SERPL-MCNC: 0.4 MG/DL (ref 0.3–1.2)
BUN BLD-MCNC: 21 MG/DL (ref 9–23)
BUN/CREAT SERPL: 18.8 (ref 7–25)
CALCIUM SPEC-SCNC: 10.4 MG/DL (ref 8.7–10.4)
CHLORIDE SERPL-SCNC: 107 MMOL/L (ref 99–109)
CHOLEST SERPL-MCNC: 167 MG/DL (ref 0–200)
CO2 SERPL-SCNC: 29 MMOL/L (ref 20–31)
CREAT BLD-MCNC: 1.12 MG/DL (ref 0.6–1.3)
DEPRECATED RDW RBC AUTO: 47.8 FL (ref 37–54)
ERYTHROCYTE [DISTWIDTH] IN BLOOD BY AUTOMATED COUNT: 13.4 % (ref 11.3–14.5)
GFR SERPL CREATININE-BSD FRML MDRD: 65 ML/MIN/1.73
GLOBULIN UR ELPH-MCNC: 2.4 GM/DL
GLUCOSE BLD-MCNC: 135 MG/DL (ref 70–100)
HBA1C MFR BLD: 7.4 % (ref 4.8–5.6)
HCT VFR BLD AUTO: 48.7 % (ref 38.9–50.9)
HDLC SERPL-MCNC: 32 MG/DL (ref 40–60)
HGB BLD-MCNC: 15.7 G/DL (ref 13.1–17.5)
MCH RBC QN AUTO: 31.7 PG (ref 27–31)
MCHC RBC AUTO-ENTMCNC: 32.2 G/DL (ref 32–36)
MCV RBC AUTO: 98.4 FL (ref 80–99)
PLATELET # BLD AUTO: 290 10*3/MM3 (ref 150–450)
PMV BLD AUTO: 12.1 FL (ref 6–12)
POTASSIUM BLD-SCNC: 4.8 MMOL/L (ref 3.5–5.5)
PROT SERPL-MCNC: 7 G/DL (ref 5.7–8.2)
RBC # BLD AUTO: 4.95 10*6/MM3 (ref 4.2–5.76)
SODIUM BLD-SCNC: 144 MMOL/L (ref 132–146)
TRIGL SERPL-MCNC: 274 MG/DL (ref 0–150)
TSH SERPL DL<=0.05 MIU/L-ACNC: 3.61 MIU/ML (ref 0.35–5.35)
WBC NRBC COR # BLD: 14.61 10*3/MM3 (ref 3.5–10.8)

## 2019-04-02 PROCEDURE — 84443 ASSAY THYROID STIM HORMONE: CPT | Performed by: INTERNAL MEDICINE

## 2019-04-02 PROCEDURE — 80053 COMPREHEN METABOLIC PANEL: CPT | Performed by: INTERNAL MEDICINE

## 2019-04-02 PROCEDURE — 85027 COMPLETE CBC AUTOMATED: CPT | Performed by: INTERNAL MEDICINE

## 2019-04-02 PROCEDURE — 36415 COLL VENOUS BLD VENIPUNCTURE: CPT | Performed by: INTERNAL MEDICINE

## 2019-04-02 PROCEDURE — 83036 HEMOGLOBIN GLYCOSYLATED A1C: CPT | Performed by: INTERNAL MEDICINE

## 2019-04-02 PROCEDURE — 80061 LIPID PANEL: CPT | Performed by: INTERNAL MEDICINE

## 2019-04-02 RX ORDER — CEFDINIR 300 MG/1
300 CAPSULE ORAL 2 TIMES DAILY
Qty: 28 CAPSULE | Refills: 0 | Status: SHIPPED | OUTPATIENT
Start: 2019-04-02 | End: 2019-01-01

## 2019-04-19 RX ORDER — TAMSULOSIN HYDROCHLORIDE 0.4 MG/1
1 CAPSULE ORAL NIGHTLY
Qty: 180 CAPSULE | Refills: 1 | Status: SHIPPED | OUTPATIENT
Start: 2019-04-19 | End: 2019-01-01

## 2019-08-16 PROBLEM — R09.89 ABNORMAL LUNG SOUNDS: Status: ACTIVE | Noted: 2019-01-01

## 2019-08-16 NOTE — PROGRESS NOTES
Patient is a 69 y.o. male who is here for a pre op clearance.  Chief Complaint   Patient presents with   • Pre-op Exam         HPI:    Here for pre op for LEFT cataract.  No dizziness or lightheadedness.  No CP. Some SOB.  No excessive bruising.  No PND or orthopnea.  No cough.  Off aspirin.  FSBS are ok.      History:     Patient Active Problem List   Diagnosis   • Chronic coronary artery disease   • Chronic pain syndrome   • Depression   • Hyperlipidemia   • Uncontrolled type 2 diabetes mellitus (CMS/HCC)   • Chronic neck and back pain   • Right shoulder pain   • Gastroesophageal reflux disease with esophagitis   • Abnormal lung sounds       Past Medical History:   Diagnosis Date   • Back pain    • Depression    • Hep C w/o coma, chronic (CMS/HCC)     RESOLVED s/p TX       Past Surgical History:   Procedure Laterality Date   • SKULL FRACTURE ELEVATION  1959       Current Outpatient Medications on File Prior to Visit   Medication Sig   • atorvastatin (LIPITOR) 20 MG tablet Take 1 tablet by mouth Daily.   • DULoxetine (CYMBALTA) 60 MG capsule Take 1 capsule by mouth Daily.   • Empagliflozin-Linagliptin (GLYXAMBI) 25-5 MG tablet Take 0.5 tablets by mouth Every Morning.   • fluticasone (FLONASE) 50 MCG/ACT nasal spray 1 spray into the nostril(s) as directed by provider 2 (Two) Times a Day.   • glimepiride (AMARYL) 2 MG tablet Take 1 tablet by mouth Every Morning Before Breakfast.   • metFORMIN ER (GLUCOPHAGE-XR) 500 MG 24 hr tablet Take 2 tablets by mouth 2 (Two) Times a Day.   • omeprazole (priLOSEC) 20 MG capsule Take 1 capsule by mouth 2 (Two) Times a Day.   • oxyCODONE (ROXICODONE) 10 MG tablet Take 1 tablet by mouth 4 (Four) Times a Day Before Meals & at Bedtime.   • sildenafil (VIAGRA) 100 MG tablet Take 1 tablet by mouth Daily As Needed for erectile dysfunction.   • zolpidem (AMBIEN) 10 MG tablet Take 1 tablet by mouth At Night As Needed for Sleep.   • [DISCONTINUED] cefdinir (OMNICEF) 300 MG capsule Take 1  capsule by mouth 2 (Two) Times a Day.   • [DISCONTINUED] Glycopyrrolate-Formoterol (BEVESPI AEROSPHERE) 9-4.8 MCG/ACT aerosol Inhale 2 puffs 2 (Two) Times a Day.   • [DISCONTINUED] losartan (COZAAR) 100 MG tablet Take 1 tablet by mouth Every Night.   • [DISCONTINUED] rosuvastatin (CRESTOR) 20 MG tablet Take 1 tablet by mouth Daily.   • [DISCONTINUED] tamsulosin (FLOMAX) 0.4 MG capsule 24 hr capsule Take 1 capsule by mouth Every Night.     No current facility-administered medications on file prior to visit.        No family history on file.    Social History     Socioeconomic History   • Marital status:      Spouse name: Not on file   • Number of children: Not on file   • Years of education: Not on file   • Highest education level: Not on file   Tobacco Use   • Smoking status: Never Smoker   • Smokeless tobacco: Never Used   Substance and Sexual Activity   • Drug use: No         Review of Systems   Constitutional: Positive for fatigue. Negative for chills, fever and unexpected weight change.   HENT: Negative for congestion, ear pain, hearing loss, rhinorrhea, sinus pressure, sore throat and trouble swallowing.    Eyes: Negative for discharge and itching.   Respiratory: Negative for cough, chest tightness and shortness of breath.    Cardiovascular: Negative for chest pain, palpitations and leg swelling.   Gastrointestinal: Negative for abdominal pain, blood in stool, constipation, diarrhea and vomiting.        11/16 colonoscopy without polyps   Endocrine: Negative for polydipsia and polyuria.   Genitourinary: Negative for difficulty urinating, dysuria, enuresis, frequency, hematuria and urgency.        8/18 PSA   Musculoskeletal: Positive for arthralgias and back pain. Negative for gait problem and joint swelling.   Skin: Negative for rash and wound.   Allergic/Immunologic: Negative for immunocompromised state.   Neurological: Negative for dizziness, syncope, weakness, light-headedness, numbness and headaches.  "  Hematological: Does not bruise/bleed easily.   Psychiatric/Behavioral: Positive for dysphoric mood and sleep disturbance. Negative for behavioral problems. The patient is not nervous/anxious.        /68 (BP Location: Left arm, Patient Position: Sitting)   Pulse 68   Ht 180.3 cm (70.98\")   Wt 77.1 kg (170 lb)   BMI 23.72 kg/m²       Physical Exam   Constitutional: He is oriented to person, place, and time. He appears well-developed and well-nourished.   HENT:   Head: Normocephalic and atraumatic.   Right Ear: External ear normal.   Left Ear: External ear normal.   Mouth/Throat: Oropharynx is clear and moist.   Eyes: Conjunctivae and EOM are normal.   Neck: Normal range of motion. Neck supple.   Cardiovascular: Normal rate, regular rhythm and normal heart sounds.   Pulmonary/Chest: Effort normal.   Diminished BS   Abdominal: Soft. Bowel sounds are normal.   Musculoskeletal: Normal range of motion.   Lymphadenopathy:     He has no cervical adenopathy.   Neurological: He is alert and oriented to person, place, and time.   Skin: Skin is warm and dry.   Psychiatric: He has a normal mood and affect. His behavior is normal. Thought content normal.       Procedure:      ECG 12 Lead  Date/Time: 8/16/2019 4:52 PM  Performed by: Rakesh Alcantara MD  Authorized by: Rakesh Alcantara MD   Comparison: not compared with previous ECG   Previous ECG: no previous ECG available  Rhythm: sinus rhythm  Rate: normal  Conduction: conduction normal  ST Segments: ST segments normal  T Waves: T waves normal  QRS axis: normal    Clinical impression: normal ECG          Discussion/Summary:    Cad-stop tob, Asa qam to resume after surgery  Hyperlipidemia-cont lipitor, labs soon  Chronic neck and back pain-counseled on LT risk of med  Depression-stable  Dm-counseled on diet, cont metformin and glyxambi, labs soon  Abnormal lung sounds/?COPD-check cxr, trial of Trelegy  High risk meds-labs soon    Labs noted and dw " patient    CLEARED FOR CATARACT SURGERY        Current Outpatient Medications:   •  atorvastatin (LIPITOR) 20 MG tablet, Take 1 tablet by mouth Daily., Disp: 90 tablet, Rfl: 2  •  DULoxetine (CYMBALTA) 60 MG capsule, Take 1 capsule by mouth Daily., Disp: 90 capsule, Rfl: 3  •  Empagliflozin-Linagliptin (GLYXAMBI) 25-5 MG tablet, Take 0.5 tablets by mouth Every Morning., Disp: 28 tablet, Rfl: 0  •  fluticasone (FLONASE) 50 MCG/ACT nasal spray, 1 spray into the nostril(s) as directed by provider 2 (Two) Times a Day., Disp: 3 bottle, Rfl: 3  •  glimepiride (AMARYL) 2 MG tablet, Take 1 tablet by mouth Every Morning Before Breakfast., Disp: 90 tablet, Rfl: 3  •  metFORMIN ER (GLUCOPHAGE-XR) 500 MG 24 hr tablet, Take 2 tablets by mouth 2 (Two) Times a Day., Disp: 360 tablet, Rfl: 3  •  omeprazole (priLOSEC) 20 MG capsule, Take 1 capsule by mouth 2 (Two) Times a Day., Disp: 180 capsule, Rfl: 3  •  oxyCODONE (ROXICODONE) 10 MG tablet, Take 1 tablet by mouth 4 (Four) Times a Day Before Meals & at Bedtime., Disp: 120 tablet, Rfl: 0  •  sildenafil (VIAGRA) 100 MG tablet, Take 1 tablet by mouth Daily As Needed for erectile dysfunction., Disp: 30 tablet, Rfl: 5  •  zolpidem (AMBIEN) 10 MG tablet, Take 1 tablet by mouth At Night As Needed for Sleep., Disp: 30 tablet, Rfl: 2  •  Fluticasone-Umeclidin-Vilant (TRELEGY ELLIPTA) 100-62.5-25 MCG/INH aerosol powder , Inhale 1 each Every Morning., Disp: 28 each, Rfl: 0        Calin was seen today for pre-op exam.    Diagnoses and all orders for this visit:    Other hyperlipidemia  -     Comprehensive Metabolic Panel  -     Lipid Panel    Chronic coronary artery disease  -     CBC (No Diff)    Gastroesophageal reflux disease with esophagitis  -     Vitamin B12    Uncontrolled type 2 diabetes mellitus with hyperglycemia (CMS/HCC)  -     Hemoglobin A1c  -     Microalbumin / Creatinine Urine Ratio - Urine, Clean Catch    Chronic pain syndrome    Chronic neck and back pain    Depression,  unspecified depression type    Abnormal lung sounds  -     Fluticasone-Umeclidin-Vilant (TRELEGY ELLIPTA) 100-62.5-25 MCG/INH aerosol powder ; Inhale 1 each Every Morning.  -     XR Chest PA & Lateral    Screening for prostate cancer  -     PSA Screen    Other orders  -     ECG 12 Lead

## 2019-10-03 PROBLEM — R52 INTRACTABLE PAIN: Status: ACTIVE | Noted: 2019-01-01

## 2019-10-04 PROBLEM — R16.0 LIVER MASS: Status: ACTIVE | Noted: 2019-01-01

## 2019-10-04 PROBLEM — R76.8 HCV ANTIBODY POSITIVE: Status: ACTIVE | Noted: 2019-01-01

## 2019-10-04 PROBLEM — D72.829 LEUKOCYTOSIS: Status: ACTIVE | Noted: 2019-01-01

## 2019-10-04 PROBLEM — R91.8 HILAR MASS: Status: ACTIVE | Noted: 2019-01-01

## 2019-10-04 PROBLEM — R74.01 TRANSAMINITIS: Status: ACTIVE | Noted: 2019-01-01

## 2019-10-04 PROBLEM — R91.8 LUNG MASS: Status: ACTIVE | Noted: 2019-01-01

## 2019-10-04 NOTE — ANESTHESIA PROCEDURE NOTES
Airway  Urgency: elective    Date/Time: 10/4/2019 10:14 AM  Airway not difficult    General Information and Staff    Patient location during procedure: OR  CRNA: Isatu Lisa CRNA    Indications and Patient Condition  Indications for airway management: airway protection    Preoxygenated: yes  MILS not maintained throughout  Mask difficulty assessment: 1 - vent by mask    Final Airway Details  Final airway type: endotracheal airway      Successful airway: ETT  Cuffed: yes   Successful intubation technique: direct laryngoscopy and video laryngoscopy  Facilitating devices/methods: intubating stylet  Endotracheal tube insertion site: oral  Blade: Bill  Blade size: 4  ETT size (mm): 7.0  Cormack-Lehane Classification: grade III - view of epiglottis only  Placement verified by: chest auscultation and capnometry   Measured from: lips  ETT/EBT  to lips (cm): 20  Number of attempts at approach: 1    Additional Comments  Negative epigastric sounds, Breath sound equal bilaterally with symmetric chest rise and fall.  Easy mask with oral airway.  Dl with Sierra 2, Grade 3 view.  DL with Glide scope, Grade 2a view.  Ett placed atraumatic, teeth intact

## 2019-10-04 NOTE — ANESTHESIA POSTPROCEDURE EVALUATION
Patient: Calin Portillo    Procedure Summary     Date:  10/04/19 Room / Location:   [facility]    Anesthesia Start:  1005 Anesthesia Stop:  1111    Procedure:  BRONCHOSCOPY WITH ENDOBRONCHIAL ULTRASOUND (N/A ) Diagnosis:      Surgeon:  Michael Casey DO Provider:  Tomy Jaimes MD    Anesthesia Type:  general ASA Status:  3          Anesthesia Type: general  Last vitals  BP   107/59   Temp   97.0   Pulse   80   Resp   16     SpO2   90     Post Anesthesia Care and Evaluation    Patient location during evaluation: PACU  Patient participation: complete - patient participated  Level of consciousness: awake and alert  Pain score: 0  Pain management: adequate  Airway patency: patent  Anesthetic complications: No anesthetic complications  PONV Status: none  Cardiovascular status: hemodynamically stable and acceptable  Respiratory status: nonlabored ventilation, acceptable and nasal cannula  Hydration status: acceptable

## 2019-10-04 NOTE — ANESTHESIA PREPROCEDURE EVALUATION
Anesthesia Evaluation     Patient summary reviewed and Nursing notes reviewed   no history of anesthetic complications:  NPO Solid Status: > 8 hours  NPO Liquid Status: > 2 hours           Airway   Mallampati: II  TM distance: >3 FB  Neck ROM: full  Possible difficult intubation  Dental - normal exam     Pulmonary    (+) lung cancer, decreased breath sounds,   (-) rhonchi  Cardiovascular   Exercise tolerance: good (4-7 METS)    Rhythm: regular  Rate: normal    (+) hypertension well controlled less than 2 medications, CAD, cardiac stents Bare metal stent more than 12 months ago hyperlipidemia,       Neuro/Psych  (+) psychiatric history,     GI/Hepatic/Renal/Endo    (+)  GERD well controlled,  hepatitis C, liver disease, diabetes mellitus type 2 well controlled,     Musculoskeletal     (+) back pain, neck pain,   Abdominal   (-) obese    Abdomen: soft.   Substance History      OB/GYN          Other   (+) arthritis                     Anesthesia Plan    ASA 3     general     intravenous induction   Anesthetic plan, all risks, benefits, and alternatives have been provided, discussed and informed consent has been obtained with: patient.    Plan discussed with CRNA.

## 2019-10-08 PROBLEM — C34.11 SMALL CELL LUNG CANCER, RIGHT UPPER LOBE (HCC): Status: ACTIVE | Noted: 2019-01-01

## 2019-10-09 NOTE — ANESTHESIA PREPROCEDURE EVALUATION
Anesthesia Evaluation     Patient summary reviewed and Nursing notes reviewed   no history of anesthetic complications:  NPO Solid Status: > 8 hours  NPO Liquid Status: > 2 hours           Airway   Mallampati: III  TM distance: >3 FB  Neck ROM: full  Possible difficult intubation  Dental - normal exam     Pulmonary    (+) lung cancer, decreased breath sounds,   Cardiovascular   Exercise tolerance: poor (<4 METS)    Rhythm: regular  Rate: normal    (+) hypertension well controlled, CAD, hyperlipidemia,       Neuro/Psych  (+) psychiatric history Depression,     GI/Hepatic/Renal/Endo    (+)   hepatitis C, liver disease, diabetes mellitus type 2 poorly controlled,     Musculoskeletal     (+) back pain, neck pain,   Abdominal   (-) obese    Abdomen: soft.   Substance History      OB/GYN          Other      history of cancer active                    Anesthesia Plan    ASA 3     general     intravenous induction   Anesthetic plan, all risks, benefits, and alternatives have been provided, discussed and informed consent has been obtained with: patient.    Plan discussed with CRNA.

## 2019-10-09 NOTE — ANESTHESIA POSTPROCEDURE EVALUATION
Patient: Calin Portillo    Procedure Summary     Date:  10/09/19 Room / Location:   [facility]    Anesthesia Start:  0727 Anesthesia Stop:      Procedure:  INSERTION VENOUS ACCESS DEVICE (N/A ) Diagnosis:       Liver mass      (Liver mass [R16.0])    Surgeon:  Chema Sandoval MD Provider:  Tomy Jaimes MD    Anesthesia Type:  general ASA Status:  3          Anesthesia Type: general  Last vitals  /91   Temp 98.1   Pulse 100   Resp 18   SpO2 94     Post Anesthesia Care and Evaluation    Patient location during evaluation: PACU  Patient participation: complete - patient participated  Level of consciousness: awake and alert  Pain score: 0  Pain management: adequate  Airway patency: patent  Anesthetic complications: No anesthetic complications  PONV Status: none  Cardiovascular status: hemodynamically stable and acceptable  Respiratory status: nonlabored ventilation, acceptable and nasal cannula  Hydration status: acceptable

## 2019-10-13 NOTE — OUTREACH NOTE
Prep Survey      Responses   Facility patient discharged from?  [facility]   Is patient eligible?  Yes   Discharge diagnosis  new dx extensive small cell lung CA, port a cath placed, home with PleurX Drain   Does the patient have one of the following disease processes/diagnoses(primary or secondary)?  Other   Does the patient have Home health ordered?  Yes   What is the Home health agency?   [facility]    Is there a DME ordered?  No   General alerts for this patient  new dx Lung CA   Prep survey completed?  Yes          Madina Ochoa RN

## 2019-10-16 NOTE — OUTREACH NOTE
Medical Week 1 Survey      Responses   Facility patient discharged from?  [facility]   Does the patient have one of the following disease processes/diagnoses(primary or secondary)?  Other   Is there a successful TCM telephone encounter documented?  No   Week 1 attempt successful?  Yes   Call start time  1025   Call end time  1045   Discharge diagnosis  new dx extensive small cell lung CA, port a cath placed, home with PleurX Drain   Meds reviewed with patient/caregiver?  Yes   Is the patient having any side effects they believe may be caused by any medication additions or changes?  No   Does the patient have all medications ordered at discharge?  No   What is keeping the patient from filling the prescriptions?  Script on hold per patient [plans to  Protonix and Senokot, the others -Lidoderm , MOM, and EMLA, are on hold by pt]   Nursing Interventions  No intervention needed, Nurse provided patient education   Prescription comments  Morphine sulfate ordered by PCP 10/15/19   Is the patient taking all medications as directed (includes completed medication regime)?  N/A   Does the patient have a primary care provider?   Yes   Does the patient have an appointment with their PCP within 7 days of discharge?  N/A   Comments regarding PCP  pt is in contact with PCP by thomas, sees as needed   Has the patient kept scheduled appointments due by today?  N/A   Comments  plans to make appt with palliative care today   What is the Home health agency?   [facility]    Has home health visited the patient within 72 hours of discharge?  Call prior to 72 hours   Home health comments   nsg will be assisting pt with Pleurx catheter management   Psychosocial issues?  No   Comments  pt drains own Pleurx catheter   Did the patient receive a copy of their discharge instructions?  Yes   Nursing interventions  Reviewed instructions with patient   What is the patient's perception of their health status since discharge?  Improving   Is the  patient/caregiver able to teach back signs and symptoms related to disease process for when to call PCP?  Yes   Is the patient/caregiver able to teach back signs and symptoms related to disease process for when to call 911?  Yes   Is the patient/caregiver able to teach back the hierarchy of who to call/visit for symptoms/problems? PCP, Specialist, Home health nurse, Urgent Care, ED, 911  Yes   Week 1 call completed?  Yes          Kristy Serrano RN

## 2019-10-16 NOTE — OUTREACH NOTE
"States in a lot of pain.  Had car trip yesterday to Social Security office and states he was in so much pain afterwards.  Asked what pain level was, but his response was \"let's just say, I am glad there is not a gun the house.\"  Offered appt with Dr Alcantara several time, but he was insistant that he could go see him whenever he wants and would not schedule.  I did bring up Palliative Care and he states he could not remember the name of this, so could not call. \" I am so glad you brought that up.\"   Says he was in so much pain in hospital that he was not coopertive with them, however he is now ready.  I talked with Doretha at out patient Palliative Care and they will contact him today or tomorrow.  Trying to eat some, but not much appetite.  No fever or chills.  Does have Pleurx drain.  I did call him back with Palliative Care information.  I left their number on his voicemail as he requested me to do.    "

## 2019-10-25 NOTE — OUTREACH NOTE
Case Management Call Center Follow-up      Responses   [facility] Call Center Tracking Reason?  HH Delay: [facility], Medication Affordability   Has the Call Center Case Management Follow-up issue been resolved?  Yes   Follow-up Comments  Social work called [phone] and spoke to Calin Portillo. He states that he declined home health because he has been getting supplies delivered to his home and he is not interested in having home health.  He also states that his primary care doctors office is assisting the  concerns about the pain medication affordability.          TOBI Haro    10/25/2019, 1:34 PM

## 2019-10-25 NOTE — OUTREACH NOTE
Medical Week 2 Survey      Responses   Facility patient discharged from?  [place]   Does the patient have one of the following disease processes/diagnoses(primary or secondary)?  Other   Week 2 attempt successful?  Yes   Call start time  1148   General alerts for this patient  new dx Lung CA   Discharge diagnosis  new dx extensive small cell lung CA, port a cath placed, home with PleurX Drain   Call end time  0912   Is patient permission given to speak with other caregiver?  Yes   Meds reviewed with patient/caregiver?  Yes   Is the patient having any side effects they believe may be caused by any medication additions or changes?  No   Does the patient have all medications ordered at discharge?  Yes   Is the patient taking all medications as directed (includes completed medication regime)?  Yes   Does the patient have a primary care provider?   Yes   Does the patient have an appointment with their PCP within 7 days of discharge?  Yes   Has the patient kept scheduled appointments due by today?  Yes   What is the Home health agency?   [facility]   Has home health visited the patient within 72 hours of discharge?  No   Psychosocial issues?  Yes   Notified Case Management  Psychosocial (Non Urgent)   Comments  Spouse states that pt cannot affor his meds for pain control. Also spouse states that HH never came over.   Did the patient receive a copy of their discharge instructions?  Yes   Nursing interventions  Reviewed instructions with patient   What is the patient's perception of their health status since discharge?  Improving   Is the patient/caregiver able to teach back signs and symptoms related to disease process for when to call PCP?  Yes   Is the patient/caregiver able to teach back signs and symptoms related to disease process for when to call 911?  Yes   Is the patient/caregiver able to teach back the hierarchy of who to call/visit for symptoms/problems? PCP, Specialist, Home health nurse, Urgent Care, ED, 911   Yes   Week 2 Call Completed?  Yes          Yash Jimenez RN

## 2019-10-28 PROBLEM — Z45.2 ENCOUNTER FOR CARE RELATED TO VASCULAR ACCESS PORT: Status: ACTIVE | Noted: 2019-01-01

## 2019-10-28 NOTE — PROGRESS NOTES
DATE OF VISIT: 10/28/2019    REASON FOR VISIT: Followup for extensive stage small cell lung cancer     HISTORY OF PRESENT ILLNESS: The patient is a very pleasant 69 y.o. male  with past medical history significant for extensive stage small cell lung cancer diagnosed October 4, 2019.  He was started on palliative treatment with carbo etoposide and Tecentriq October 9, 2019.  The patient is here today for scheduled follow-up visit with treatment, cycle #2.    SUBJECTIVE: The patient has been doing fairly well. he was able to tolerate  his treatment without any serious side effects. he denied any fever or  chills, no night sweats, denied any headaches    PAST MEDICAL HISTORY/SOCIAL HISTORY/FAMILY HISTORY: Reviewed by me and unchanged from my documentation done on 10/28/19.    Review of Systems   Constitutional: Positive for fatigue. Negative for activity change, appetite change, chills, fever and unexpected weight change.   HENT: Negative for hearing loss, mouth sores, nosebleeds, sore throat and trouble swallowing.    Eyes: Negative for visual disturbance.   Respiratory: Positive for cough and shortness of breath. Negative for chest tightness and wheezing.    Cardiovascular: Negative for chest pain, palpitations and leg swelling.   Gastrointestinal: Positive for nausea. Negative for abdominal distention, abdominal pain, blood in stool, constipation, diarrhea, rectal pain and vomiting.   Endocrine: Negative for cold intolerance and heat intolerance.   Genitourinary: Negative for difficulty urinating, dysuria, frequency and urgency.   Musculoskeletal: Negative for arthralgias, back pain, gait problem, joint swelling and myalgias.   Skin: Negative for rash.   Neurological: Negative for dizziness, tremors, syncope, weakness, light-headedness, numbness and headaches.   Hematological: Negative for adenopathy. Does not bruise/bleed easily.   Psychiatric/Behavioral: Negative for confusion, sleep disturbance and suicidal  "ideas. The patient is not nervous/anxious.          Current Outpatient Medications:   •  Empagliflozin-Linagliptin (GLYXAMBI) 25-5 MG tablet, Take 0.5 tablets by mouth Every Morning., Disp: 28 tablet, Rfl: 0  •  fluticasone (FLONASE) 50 MCG/ACT nasal spray, 1 spray into the nostril(s) as directed by provider 2 (Two) Times a Day., Disp: 3 bottle, Rfl: 3  •  Fluticasone-Umeclidin-Vilant (TRELEGY ELLIPTA) 100-62.5-25 MCG/INH aerosol powder , Inhale 1 each Every Morning., Disp: 28 each, Rfl: 0  •  glimepiride (AMARYL) 2 MG tablet, Take 1 tablet by mouth Every Morning Before Breakfast., Disp: 90 tablet, Rfl: 3  •  lidocaine-prilocaine (EMLA) 2.5-2.5 % cream, Apply  topically to the appropriate area as directed As Needed (45-60 minutes prior to port access.  Cover with saran/plastic wrap.)., Disp: 30 g, Rfl: 3  •  metFORMIN ER (GLUCOPHAGE-XR) 500 MG 24 hr tablet, Take 2 tablets by mouth 2 (Two) Times a Day., Disp: 360 tablet, Rfl: 3  •  Morphine (MS CONTIN) 15 MG 12 hr tablet, Take 3 tablets by mouth 2 (Two) Times a Day., Disp: 180 tablet, Rfl: 0  •  oxyCODONE (ROXICODONE) 10 MG tablet, Take 1 tablet by mouth 4 (Four) Times a Day Before Meals & at Bedtime., Disp: 120 tablet, Rfl: 0  •  oxyCODONE HCl ER (oxyCONTIN) 60 MG 12 hr tablet, Take 1 tablet by mouth Every 12 (Twelve) Hours. Dc Morphine SR from profile, Disp: 60 tablet, Rfl: 0  •  pantoprazole (PROTONIX) 40 MG EC tablet, Take 1 tablet by mouth Every Morning for 28 days., Disp: 28 tablet, Rfl: 0  •  senna (SENOKOT) 8.6 MG tablet tablet, Take 2 tablets by mouth 2 (Two) Times a Day for 30 days., Disp: 120 tablet, Rfl: 0  •  zolpidem (AMBIEN) 10 MG tablet, Take 1 tablet by mouth At Night As Needed for Sleep., Disp: 30 tablet, Rfl: 2    PHYSICAL EXAMINATION:   /74   Pulse 92   Temp 98 °F (36.7 °C) (Temporal)   Resp 16   Ht 180.3 cm (70.98\")   Wt 72.1 kg (159 lb)   SpO2 92%   BMI 22.19 kg/m²    ECOG Performance Status: 1 - Symptomatic but completely " ambulatory  General Appearance:  alert, cooperative, no apparent distress and appears stated age   Neurologic/Psychiatric: A&O x 3, gait steady, appropriate affect, strength 5/5 in all muscle groups   HEENT:  Normocephalic, without obvious abnormality, mucous membranes moist   Neck: Supple, symmetrical, trachea midline, no adenopathy;  No thyromegaly, masses, or tenderness   Lungs:   Clear to auscultation bilaterally; respirations regular, even, and unlabored bilaterally   Heart:  Regular rate and rhythm, no murmurs appreciated   Abdomen:   Soft, non-tender, non-distended and no organomegaly   Lymph nodes: No cervical, supraclavicular, inguinal or axillary adenopathy noted   Extremities: Normal, atraumatic; no clubbing, cyanosis, or edema    Skin: No rashes, ulcers, or suspicious lesions noted     No visits with results within 2 Week(s) from this visit.   Latest known visit with results is:   Admission on 10/03/2019, Discharged on 10/12/2019   No results displayed because visit has over 200 results.           Ct Angiogram Chest With & Without Contrast    Result Date: 10/10/2019  Narrative: EXAMINATION: CT ANGIOGRAM CHEST W WO CONTRAST-  INDICATION: Acute hypoxia, D-dimer elevated; R91.8-Other nonspecific abnormal finding of lung field; R16.0-Hepatomegaly, not elsewhere classified; N42.89-Other specified disorders of prostate; R22.2-Localized swelling, mass and lump, trunk; K18-Pygplfn effusion, not elsewhere classified; R07.9-Chest pain, unspecified; R59.0-Localized enlarged lymph node.  TECHNIQUE: Dedicated CTA imaging of the chest was performed utilizing a pulmonary arterial embolus protocol. Additional coronal and sagittal reformatted MIP imaging was performed.  The radiation dose reduction device was turned on for each scan per the ALARA (As Low as Reasonably Achievable) protocol.  COMPARISON: CTA of the chest 10/03/2019.  FINDINGS: There is adequate bolus timing and opacification of the pulmonary arteries which  demonstrates no central pulmonary arterial filling defect. There are no convincing evidence for filling defects within the pulmonary arterial vasculature to suggest acute pulmonary arterial embolus. Right middle lobe atelectasis and respiratory motion limits evaluation of the right middle lobe pulmonary arteries. The previously described right suprahilar mass is again identified with associated mediastinal extension concerning for primary bronchogenic carcinoma. This does continue to narrow the right upper lobe pulmonary artery, similar to prior exam. There remains diffuse pleural nodularity and a moderate to large right-sided pleural effusion, similar to 10/03/2019. Right cardiophrenic lymph node/mass is again identified, similar to prior. The aorta demonstrates no obvious acute aortic pathology. The heart is not enlarged. Right middle lobe atelectasis is identified which is increased from prior exam. The left lung remains predominantly clear. Osseous structures appear intact. No aggressive osseous lesion is appreciated. Thoracolumbar degenerative changes are identified. There is, however, redemonstration of an approximate T7 vertebral body lesion as previously described. The visualized upper abdomen does not reveal acute abnormality and was previously evaluated on 10/03/2019 CT exam.      Impression: 1. No CT evidence for pulmonary arterial embolus or acute aortic pathology appreciated. 2. Redemonstration of a right hilar mass with associated moderate to large right-sided pleural effusion and interval development of right midlung atelectasis. 3. The right hilar mass again narrows the superior vena cava and right upper lobe pulmonary artery, similar to prior. 4. Additional findings as described above.  D:  10/10/2019 E:  10/10/2019  This report was finalized on 10/10/2019 2:30 PM by Dr. Ga Pulido MD.      Mri Brain With & Without Contrast    Result Date: 10/9/2019  Narrative: EXAMINATION: MRI BRAIN W WO  CONTRAST- 10/09/2019  INDICATION: small cell lung cancer; R91.8-Other nonspecific abnormal finding of lung field; R16.0-Hepatomegaly, not elsewhere classified; N42.89-Other specified disorders of prostate; R22.2-Localized swelling, mass and lump, trunk; J76-Govvpib effusion, not elsewhere classified; R07.9-Chest pain, unspecified; R91.8-Other nonspecific abnormal finding of lung field; R59.0-Localized enlarged lymph nodes; C34.11  TECHNIQUE: Sagittal and axial T1, axial T2 FLAIR diffusion weighted images of the brain, post gadolinium contrast axial, sagittal and coronal T1-weighted images, and thin section axial T1 postcontrast images.  COMPARISON: NONE  FINDINGS: Patient history indicates small cell lung cancer, staging.  No restricted diffusion is seen to suggest acute infarction. Remaining imaging sequences show expected degree of generalized cerebral atrophy for age, and relatively mild, chronic appearing central white matter changes. No unusual focal or generalized encephalomalacia is seen. There is no apparent mass or mass effect, evidence of hemorrhage, hydrocephalus or abnormal extra-axial collection.  Postcontrast images show no evidence of enhancing mass or other pathologic postcontrast brain or meningeal enhancement.      Impression: No evidence of intracranial metastatic disease or other acute disease.  D:  10/09/2019 E:  10/09/2019    This report was finalized on 10/9/2019 10:14 PM by DR. Guru Werner MD.      Nm Bone Scan Whole Body    Result Date: 10/14/2019  Narrative: EXAMINATION: NM BONE SCAN WHOLE BODY-  INDICATION: Small cell lung cancer; R91.8-Other nonspecific abnormal finding of lung field; R16.0-Hepatomegaly, not elsewhere classified; N42.89-Other specified disorders of prostate; R22.2-Localized swelling, mass and lump, trunk; F24-Seuuvsh effusion, not elsewhere classified; R07.9-Chest pain, unspecified; R91.8-Other nonspecific abnormal finding of lung field; R59.0-Localized enlarged lymph  nodes.  TECHNIQUE: RADIOPHARMACEUTICAL: 26.8 mCi of technetium 99m MDP, IV.  COMPARISON: No previous nuclear medicine bone scan. Angiographic chest CT scan 10/09/2019. Abdominal CT scan 10/03/2019.  FINDINGS: By report, patient has known lung mass and numerous liver metastases. Today's study shows relatively subtle, but fairly numerous findings of almost punctate increased activity in multiple ribs, the thoracic spine, pelvis, and proximal femurs. There are no obvious destructive or blastic lesions corresponding to this, however, there do appear to be very subtle areas of focal sclerosis, particularly in the iliac bones, at least potentially small blastic metastases.  Symmetric uptake in the shoulders, knees, feet, wrists and elbows may simply reflect osteoarthritis. Asymmetric activity in the SC joints, right greater than left may reflect DJD as well, and the CT scan shows greater degenerative osteophyte formation on the right than left corresponding to this. There appears to be a single small focal lesion of the right frontal calvarium., Although the patient appears to have previous craniotomy or perhaps trauma in this region.      Impression: Subtle but fairly widely scattered metastatic disease, primarily of the axial skeleton. Minimal associated CT scan findings.   D:  10/10/2019 E:  10/10/2019  This report was finalized on 10/14/2019 10:37 AM by DR. Guru Werner MD.      Ct Abdomen Pelvis With Contrast    Result Date: 10/3/2019  Narrative: CT Abdomen Pelvis W INDICATION: Lung cancer suggested on recent CT chest extensive adenopathy in the metastases. TECHNIQUE: CT of the abdomen and pelvis with IV contrast. Coronal and sagittal reconstructions were obtained.  Radiation dose reduction techniques included automated exposure control or exposure modulation based on body size. Count of known CT and cardiac nuc med studies performed in previous 12 months: 1. COMPARISON: CT chest 9/27/2019 FINDINGS: Abdomen: The liver  has innumerable low-density masses consistent with widespread metastatic disease. They measure up to 3.2 cm in diameter. The spleen, pancreas, adrenal glands and kidneys are normal in appearance. There is extensive periportal adenopathy with nodes measuring up to 4.2 cm in diameter. Aorta is normal in size. There is one retroperitoneal node to the left of the aorta measuring 13 mm in diameter. The gallbladder is normal except for a single calcified stone measuring 7 mm in diameter. The bowel is normal. Pelvis: Bladder and prostate gland are normal. There are degenerative changes in the lumbar spine.     Impression: There are numerable low-density masses throughout the liver consistent with metastatic disease. There is extensive periportal adenopathy and there is a small left retroperitoneal periaortic node. Findings are consistent with metastatic disease from the patient's prostate cancer. Signer Name: West Young MD  Signed: 10/3/2019 11:17 PM  Workstation Name: [email or web address]  [facility]    Xr Chest 1 View    Result Date: 10/10/2019  Narrative: EXAMINATION: XR CHEST 1 VW- 10/10/2019  INDICATION: R91.8-Other nonspecific abnormal finding of lung field; R16.0-Hepatomegaly, not elsewhere classified; N42.89-Other specified disorders of prostate; R22.2-Localized swelling, mass and lump, trunk; R58-Frgscnz effusion, not elsewhere classified; R07.9-Chest pain, unspecified; R91.8-Other nonspecific abnormal finding of lung field; R59.0-Localized enlarged lymph nodes; C34.11-Malignant neoplasm  COMPARISON: 10/09/2019  FINDINGS: A Pleurx catheter has been inserted and there has been evacuation of a large right pleural effusion. There is some pleural-based density in the right midlung region laterally as well as patchy airspace disease in the right lower lobe. Port-A-Cath remains well-positioned. The heart size is normal.         Impression: There has been evacuation of a large right pleural effusion  following positioning of a Pleurx catheter.  D:  10/10/2019 E:  10/10/2019  This report was finalized on 10/10/2019 6:59 PM by Dr. Vinay Paniagua MD.      Xr Chest 1 View    Result Date: 10/10/2019  Narrative:  EXAMINATION: XR CHEST 1 VW - 10/09/2019  INDICATION:  R91.8-Other nonspecific abnormal finding of lung field; R16.0-Hepatomegaly, not elsewhere classified; N42.89-Other specified disorders of prostate; R22.2-Localized swelling, mass and lump, trunk; W63-Iesvlsh effusion, not elsewhere classified; R07.9-Chest pain, unspecified; R91.8-Other nonspecific abnormal finding of lung field; R59.0-Localized enlarged lymph nodes.  COMPARISON: Chest radiograph 10/09/2019.  FINDINGS: Single portable chest radiograph is submitted for review. Similar in position is a left-sided chest wall medical port. Similar right-sided pleural effusion which is moderate in size and unchanged from 10/09/2019. Similar right hilar prominence, similar to the prior CT. Visualized upper abdomen is unrevealing. No acute osseous abnormality appreciated.      Impression:  Moderate to large right-sided pleural effusion with associated hazy right lower lobe airspace changes.  Similar right perihilar masses, better seen on prior CT performed 10/03/2019.  DICTATED:   10/09/2019 EDITED/ls :   10/09/2019  This report was finalized on 10/10/2019 2:28 PM by Dr. Ga Pulido MD.      Xr Chest 1 View    Result Date: 10/9/2019  Narrative:  EXAMINATION: XR CHEST 1 VW-  INDICATION: postop port placement.  Would like patient upright for cxr.; R91.8-Other nonspecific abnormal finding of lung field; R16.0-Hepatomegaly, not elsewhere classified; N42.89-Other specified disorders of prostate; R22.2-Localized swelling, mass and lump, trunk; J01-Uwxnmct effusion, not elsewhere classified; R07.9-Chest pain, unspecified; R91.8-Other nonspecific abnormal finding of lung field; R59.0-L  COMPARISON: 09/26/2019  FINDINGS: Left-sided central venous access port is seen  with its tip in the distal SVC. There is no evidence of pneumothorax. Left lung is grossly clear except for granulomatous calcifications. On the right, there is increased effusion and atelectasis compared to prior study. Right mediastinal mass is again noted.           Impression: 1. Left-sided port placement with no evidence of pneumothorax. 2. Increasing right basilar effusion and atelectasis compared to prior study.  This report was finalized on 10/9/2019 8:38 AM by DR. Guru Werner MD.      Ct Angiogram Chest With Contrast    Addendum Date: 10/3/2019 Addendum:   //////////// ADDENDUM #1 //////////// There is a 9 mm sclerotic lesion in the T8 vertebral body which is nonspecific and this could represent a bone island or a metastatic deposit Signer Name: West Young MD  Signed: 10/3/2019 11:18 PM  Workstation Name: [facility]  [facility]////////////  ORIGINAL REPORT //////////// CTA Chest INDICATION: Right lung mass with right-sided pain. Evaluate for pulmonary embolus. TECHNIQUE: CT angiogram of the chest with IV contrast. 3-D reconstructions were obtained and reviewed.   Radiation dose reduction techniques included automated exposure control or exposure modulation based on body size. Count of known CT and cardiac nuc med studies performed in previous 12 months: 1. COMPARISON: 9/27/2019 FINDINGS: The left lung is clear except for calcified lower lobe granuloma. There is a moderate to large right pleural effusion with atelectasis of most of the right lower lobe. There is loculated pleural fluid versus pleural masses along the anterior and lateral portions of the chest. There is a right sided mass both in front of and behind the superior vena cava. This mass measures about 4.2 x 8.9 cm. There are numerous enlarged lymph nodes in the fat adjacent to the heart measuring up to 2.9 cm in diameter. There are subcarinal lymph nodes an anterior superior mediastinal nodes as well.. The right hilar  mass measures at least 3.6 cm in diameter The aorta is normal in size. There is no dissection. Pulmonary arteries are adequately opacified and there is no CT evidence pulmonary embolus.     Result Date: 10/3/2019  Narrative: CTA Chest INDICATION: Right lung mass with right-sided pain. Evaluate for pulmonary embolus. TECHNIQUE: CT angiogram of the chest with IV contrast. 3-D reconstructions were obtained and reviewed.   Radiation dose reduction techniques included automated exposure control or exposure modulation based on body size. Count of known CT and cardiac nuc med studies performed in previous 12 months: 1. COMPARISON: 9/27/2019 FINDINGS: The left lung is clear except for calcified lower lobe granuloma. There is a moderate to large right pleural effusion with atelectasis of most of the right lower lobe. There is loculated pleural fluid versus pleural masses along the anterior and lateral portions of the chest. There is a right sided mass both in front of and behind the superior vena cava. This mass measures about 4.2 x 8.9 cm. There are numerous enlarged lymph nodes in the fat adjacent to the heart measuring up to 2.9 cm in diameter. There are subcarinal lymph nodes an anterior superior mediastinal nodes as well.. The right hilar mass measures at least 3.6 cm in diameter The aorta is normal in size. There is no dissection. Pulmonary arteries are adequately opacified and there is no CT evidence pulmonary embolus.     Impression: Patient has findings in the right side of the chest consistent with malignancy with multiple enlarged lymph nodes in a several masses. There is a right hilar mass as well.. There is a large pleural effusion with either pleural based masses or loculated pleural fluid noted in the anterior lateral portion of the chest.. These findings are all unchanged from 9/27/2019 There is no CT evidence pulmonary embolus. Signer Name: West Young MD  Signed: 10/3/2019 11:13 PM  Workstation Name:  [email or web address]  [facility]    Ct Guided Thoracentesis    Result Date: 10/7/2019  Narrative: EXAMINATION: CT-GUIDED RIGHT THORACENTESIS - 10/05/2019  INDICATION:  R91.8-Other nonspecific abnormal finding of lung field; R16.0-Hepatomegaly, not elsewhere classified; N42.89-Other specified disorders of prostate; R22.2-Localized swelling, mass and lump, trunk; K38-Otzturj effusion, not elsewhere classified; R07.9-Chest pain, unspecified.  TECHNIQUE: The patient was referred for right-sided CT-guided diagnostic and therapeutic thoracentesis. The patient was brought to the CT fluoroscopy suite. All the patient's questions were answered. Informed consent was obtained from the patient. Risks discussed included bleeding, infection, damage to surrounding structures and need for further procedure. The patient was placed in the left lateral decubitus position. There was initial difficulty with placing the patient as the patient was moving and had to be instructed multiple times to lay still. Eventually the patient was adequately positioned in the CT scanner and a site was marked. The patient was prepped and draped in the typical sterile fashion. 1% lidocaine was utilized to anesthetize the subcutaneous tissues as well as the pleural space. At this time a 6.5 Armenian curve catheter was inserted just inside the pleural space where the soft tip catheter was then advanced into the pleural fluid collection. There was immediate return/aspiration of 60 cc of randy-colored pleural fluid. A total of 575 mL of randy color fluid was removed without incident. Fluid was sent to laboratory for further evaluation. Postprocedural imaging demonstrated very minimal right-sided pleural fluid. At this time the catheter was removed without incident. The patient tolerated the procedure well without complication. Postprocedural imaging demonstrated no significant pleural effusion although there was very small pleural gas  remaining.  The radiation dose reduction device was turned on for each scan per the ALARA (As Low as Reasonably Achievable) protocol.  COMPARISON: CT of the chest 10/03/2019.  FINDINGS: Preprocedural imaging demonstrated moderate to large right-sided pleural effusion. Numerous hepatic lesions are identified. This is not a  diagnostic CT. Please see CT of the chest performed 10/03/2019 for further evaluation of chest findings.      Impression:  Successful right-sided diagnostic and therapeutic thoracentesis without immediate complication.  DICTATED:   10/05/2019 EDITED/ls :   10/06/2019  This report was finalized on 10/7/2019 9:56 AM by Dr. Ga Pulido MD.        ASSESSMENT: The patient is a very pleasant 69 y.o. male  with extensive stage small cell lung cancer  Problem list:  1.  Extensive stage small cell lung cancer:  A.  Presented with abdominal pain and shortness of breath  B.  CT chest abdomen pelvis revealed right upper lobe lung mass with multiple pleural-based masses rectal effusion and diffuse liver metastases  C.  Status post bronchoscopy with biopsy done on October 4 2019 that revealed small cell lung cancer  D.  Started carboplatin and etoposide cycle #1 October 9, 2019, status post 1 cycle  2.  Right pleural effusion:  A.  Status post thoracentesis done on October 6, 2019  B.  Status post Pleurx catheter placement done by Dr. Sky October 10, 2019  3.  Hepatitis C:  A.  Status post treatment with interferon.  B.  Hepatitis C antibodies positive but viral load quantitative serology negative  4.  Cancer related pain  5.  Constipation  6.  Chemotherapy induced nausea  7.  Type 2 diabetes    PLAN:  1.  I will proceed with treatment as scheduled today carboplatin and etoposide with Tecentriq, cycle #2.  2.  The patient follow-up with us in 3 weeks for cycle #3.  3.  I reviewed the patient's scans prior to cycle #4.  If he has good response to treatment will finish 4 cycles this would be followed by  Tecentriq maintenance.  4.  I will continue to monitor patient blood work including blood counts kidney function liver function and electrolytes.  5.  We will continue to drain right pleural effusion through Pleurx catheter.  Patient was advised not to drain more than 600 cc at a time.  She suraj 1 L one time started coughing which I think is from lung irritation.  6.  We will continue Zofran as needed for chemotherapy induced nausea.  7.  We will continue port care.  Patient was given prescription for EMLA cream.  8. We discussed potential side effects of immunotherapy including but not limited to immune mediated reactions with thyroiditis, pneumonitis, hepatitis, colitis, rash, and electrolytes abnormalities, fatigue, multiorgan failure, and possibly death.  9.  We will continue extended release morphine as well as oxycodone short acting for cancer related pain.  This has been prescribed by the palliative care clinic.  10.  We will continue bowel regimen for opiate-induced constipation.  11.  Continue metformin for type 2 diabetes.    Krishna Ren MD  10/28/2019

## 2019-10-28 NOTE — PROGRESS NOTES
Oncology Nutrition Screening    Patient Name:  Calin Portillo  YOB: 1950  MRN: [id number]  Date:  10/28/19  Physician:  Dr. Ren    Type of Cancer Treatment:   Chemotherapy: Carbo(D1) / Etoposide(D1-3) / Tecentriq(D1) - every 21 days     Patient Active Problem List   Diagnosis   • Chronic coronary artery disease   • Chronic pain syndrome   • Depression   • Hyperlipidemia   • Uncontrolled type 2 diabetes mellitus (CMS/HCC)   • Chronic neck and back pain   • Right shoulder pain   • Gastroesophageal reflux disease with esophagitis   • Abnormal lung sounds   • Intractable pain   • Lung mass   • Liver mass   • Transaminitis   • Leukocytosis   • Hilar mass, right   • HCV antibody positive   • Small cell lung cancer, right upper lobe (CMS/HCC)   • Encounter for care related to vascular access port       Current Outpatient Medications   Medication Sig Dispense Refill   • Empagliflozin-Linagliptin (GLYXAMBI) 25-5 MG tablet Take 0.5 tablets by mouth Every Morning. 28 tablet 0   • fluticasone (FLONASE) 50 MCG/ACT nasal spray 1 spray into the nostril(s) as directed by provider 2 (Two) Times a Day. 3 bottle 3   • Fluticasone-Umeclidin-Vilant (TRELEGY ELLIPTA) 100-62.5-25 MCG/INH aerosol powder  Inhale 1 each Every Morning. 28 each 0   • glimepiride (AMARYL) 2 MG tablet Take 1 tablet by mouth Every Morning Before Breakfast. 90 tablet 3   • lidocaine-prilocaine (EMLA) 2.5-2.5 % cream Apply  topically to the appropriate area as directed As Needed (45-60 minutes prior to port access.  Cover with saran/plastic wrap.). 30 g 3   • metFORMIN ER (GLUCOPHAGE-XR) 500 MG 24 hr tablet Take 2 tablets by mouth 2 (Two) Times a Day. 360 tablet 3   • Morphine (MS CONTIN) 15 MG 12 hr tablet Take 3 tablets by mouth 2 (Two) Times a Day. 180 tablet 0   • oxyCODONE (ROXICODONE) 10 MG tablet Take 1 tablet by mouth 4 (Four) Times a Day Before Meals & at Bedtime. 120 tablet 0   • oxyCODONE HCl ER (oxyCONTIN) 60 MG 12 hr tablet Take  1 tablet by mouth Every 12 (Twelve) Hours. Dc Morphine SR from profile 60 tablet 0   • pantoprazole (PROTONIX) 40 MG EC tablet Take 1 tablet by mouth Every Morning for 28 days. 28 tablet 0   • senna (SENOKOT) 8.6 MG tablet tablet Take 2 tablets by mouth 2 (Two) Times a Day for 30 days. 120 tablet 0   • zolpidem (AMBIEN) 10 MG tablet Take 1 tablet by mouth At Night As Needed for Sleep. 30 tablet 2     No current facility-administered medications for this visit.      Facility-Administered Medications Ordered in Other Visits   Medication Dose Route Frequency Provider Last Rate Last Dose   • CARBOplatin (PARAPLATIN) 520 mg in sodium chloride 0.9 % 327 mL chemo IVPB  520 mg Intravenous Once Krishna Ren  mL/hr at 10/28/19 1351 520 mg at 10/28/19 1351   • etoposide (TOPOSAR) 200 mg in sodium chloride 0.9 % 560 mL chemo IVPB  100 mg/m2 (Treatment Plan Recorded) Intravenous Once Krishna eRn MD       • heparin flush (porcine) 100 UNIT/ML injection 500 Units  500 Units Intravenous PRN Krishna Ren MD       • sodium chloride 0.9 % flush 10 mL  10 mL Intravenous PRN Krishna Ren MD           Glycemic Risk:   NIDDM    Weight:   Height: 71 inches  Weight: 159 lbs.  Usual Body Weight: ~190 lbs.   BMI: 22.2  Normal  Weight has decreased ~30 pounds over last ~6 months; 15.8% weight loss    Oral Food Intake:  Regular Diet - No Restrictions    Hydration Status:   How many 8 ounce glass of water of fluid do you drink per day?  He states he is working on increasing his fluid intake.    Enteral Feeding:   n/a    Nutrition Symptoms:   Altered Apetite  Altered Taste Perception  Fatigue    Activity:   Not assessed at this time.     reports that he has never smoked. He has never used smokeless tobacco. He reports that he does not drink alcohol or use drugs.    Evaluation of Nutritional Risk:   Patient has been identified at nutritional risk due to diagnosis, treatment plan, significant weight loss, and nutrition impact  "symptoms.  Met with patient and his wife during his chemotherapy infusion appointment.  Note he received his 1st cycle of chemo while an inpatient at [facility].  He complains of taste changes and lack of desire to eat which has resulted in decreased oral intake and weight loss.  He does report drinking ~3 Ensure Plus daily to aid with intake.    Discussed the importance of good nutrition during his treatment course focusing on adequate calorie, protein, nutrient and fluid intake.  Advised him to be consuming smaller more frequent meals/snacks throughout the day to aid with potential nausea management.  Instructed him to use the baking soda / salt mouth rinses before he eats to aid with taste changes.  Emphasized the importance of protein and its role in the diet; reviewed high protein foods; and recommended he have a protein source at each meal/snack.  Offered several high protein snack ideas he may find more appealing at this time.  Also emphasized the importance of hydration; reviewed good hydrating fluid options; and recommended he drink at least 64 ounces daily.  Discussed nutritional supplements and their role in the diet.  Provided samples of Boost Plus and Ensure Enlive for him to try at home.  Also provided written diet materials \"Taste and Smell Changes\" to reinforce information discussed.    Answered their questions and both voiced understanding of information discussed.  RD's contact information provided and encouraged to call with questions.  Will monitor as needed during his treatment course.    Electronically signed by:  Olivia Laurent RD  2:11 PM  "

## 2019-11-03 NOTE — OUTREACH NOTE
Medical Week 3 Survey      Responses   Facility patient discharged from?  [facility]   Does the patient have one of the following disease processes/diagnoses(primary or secondary)?  Other   Week 3 attempt successful?  No   Unsuccessful attempts  Attempt 1          Lani Mclean RN

## 2019-11-05 NOTE — OUTREACH NOTE
Medical Week 3 Survey      Responses   Facility patient discharged from?  [facility]   Does the patient have one of the following disease processes/diagnoses(primary or secondary)?  Other   Week 3 attempt successful?  No   Unsuccessful attempts  Attempt 2          Monica Sethi RN

## 2019-11-06 NOTE — PROGRESS NOTES
"Patient Information  Calin Portillo                                                                                          [address] [facility] [address]      1950  [unfilled]  [unfilled]    Chief Complaint   Patient presents with   • Follow-up     Hospital F/U  S/P right pleurx cath placement for malignant recurrent, pleural effusion port-a-cath. Labor breathing, constant pain in right side especially when moving.       History of Present Illness: Patient seen in the [place] office for follow-up of a left subclavian Port-A-Cath and a right Pleurx dressing is intact.  His Pleurx placement for malignant pleural effusion.  The patient has a diagnosis of stage IV small cell lung cancer with a malignant pleural effusion.  Patient appetite is poor and is been losing weight.  He is now on chemotherapy per Dr. Ren.  He has been draining the Pleurx catheter every 4 to 5 days with 500 mL's removed.    Vitals:    11/06/19 1528   BP: 124/82   Pulse: 105   Temp: 98.7 °F (37.1 °C)   TempSrc: Temporal   SpO2: 98%   Weight: 68 kg (150 lb)   Height: 180.3 cm (71\")        Physical Exam examination today reveals a very thin individual but no acute distress.  His his left subclavian Port-A-Cath site is healing well and his right Pleurx catheter site dressing is dry and intact.  His lungs are clear bilaterally.    Lab/other results: No labs drawn today and no x-ray was done    Assessment: #1.  Stage IV small cell lung cancer metastatic to the liver and to the right pleural space.  2.  Placement of left subclavian Port-A-Cath and right Pleurx catheter placement approximately 3 weeks ago.  3.  Chemotherapy per Dr. Ren    Plan: We will plan to see the patient back in about 3 months for follow-up visit.  I told him he may drain the Pleurx catheter every 3 to 4 days as needed    Chema Sandoval M.D."

## 2019-11-15 PROBLEM — J91.0 MALIGNANT PLEURAL EFFUSION: Status: ACTIVE | Noted: 2019-01-01

## 2019-11-15 PROBLEM — I63.512 LEFT MIDDLE CEREBRAL ARTERY STROKE (HCC): Status: ACTIVE | Noted: 2019-01-01

## 2019-11-15 PROBLEM — I63.9 CEREBROVASCULAR ACCIDENT (CVA) (HCC): Status: ACTIVE | Noted: 2019-01-01

## 2019-11-15 PROBLEM — R09.89 SUSPECTED CEREBROVASCULAR ACCIDENT (CVA): Status: ACTIVE | Noted: 2019-01-01

## 2019-11-17 NOTE — OUTREACH NOTE
Prep Survey      Responses   Facility patient discharged from?  [facility]   Is patient eligible?  Yes   Discharge diagnosis  AIS L MCA s/p tPA + thrombectomy   Does the patient have one of the following disease processes/diagnoses(primary or secondary)?  Stroke (TIA)   Does the patient have Home health ordered?  No   Is there a DME ordered?  No   Prep survey completed?  Yes          Amber Musa RN

## 2019-11-18 NOTE — PROGRESS NOTES
DATE OF VISIT: 11/18/2019    REASON FOR VISIT: Followup for extensive stage small cell lung cancer     HISTORY OF PRESENT ILLNESS: The patient is a very pleasant 69 y.o. male  with past medical history significant for extensive stage small cell lung cancer diagnosed October 4, 2019.  He was started on palliative treatment with carbo etoposide and Tecentriq October 9, 2019.  The patient is here today for scheduled follow-up visit with treatment, cycle #3.    SUBJECTIVE: The patient is here today with his wife.  He has recovered completely from his recent embolic stroke.  I am suspecting his stroke was induced by hypercoagulable state from his underlying malignancy.    PAST MEDICAL HISTORY/SOCIAL HISTORY/FAMILY HISTORY: Reviewed by me and unchanged from my documentation done on 11/18/19.    Review of Systems   Constitutional: Positive for fatigue. Negative for activity change, appetite change, chills, fever and unexpected weight change.   HENT: Negative for hearing loss, mouth sores, nosebleeds, sore throat and trouble swallowing.    Eyes: Negative for visual disturbance.   Respiratory: Positive for cough and shortness of breath. Negative for chest tightness and wheezing.    Cardiovascular: Negative for chest pain, palpitations and leg swelling.   Gastrointestinal: Positive for nausea. Negative for abdominal distention, abdominal pain, blood in stool, constipation, diarrhea, rectal pain and vomiting.   Endocrine: Negative for cold intolerance and heat intolerance.   Genitourinary: Negative for difficulty urinating, dysuria, frequency and urgency.   Musculoskeletal: Negative for arthralgias, back pain, gait problem, joint swelling and myalgias.   Skin: Negative for rash.   Neurological: Negative for dizziness, tremors, syncope, weakness, light-headedness, numbness and headaches.   Hematological: Negative for adenopathy. Does not bruise/bleed easily.   Psychiatric/Behavioral: Negative for confusion, sleep disturbance  "and suicidal ideas. The patient is not nervous/anxious.          Current Outpatient Medications:   •  Apixaban (ELIQUIS PO), Take  by mouth., Disp: , Rfl:   •  aspirin 81 MG EC tablet, Take 1 tablet by mouth Daily., Disp: , Rfl:   •  atorvastatin (LIPITOR) 80 MG tablet, Take 1 tablet by mouth Every Night., Disp: 30 tablet, Rfl: 3  •  Empagliflozin-Linagliptin (GLYXAMBI) 25-5 MG tablet, Take 0.5 tablets by mouth Every Morning., Disp: 28 tablet, Rfl: 0  •  fluticasone (FLONASE) 50 MCG/ACT nasal spray, 1 spray into the nostril(s) as directed by provider 2 (Two) Times a Day., Disp: 3 bottle, Rfl: 3  •  Fluticasone-Umeclidin-Vilant (TRELEGY ELLIPTA) 100-62.5-25 MCG/INH aerosol powder , Inhale 1 each Every Morning., Disp: 28 each, Rfl: 0  •  glimepiride (AMARYL) 2 MG tablet, Take 1 tablet by mouth Every Morning Before Breakfast., Disp: 90 tablet, Rfl: 3  •  lidocaine-prilocaine (EMLA) 2.5-2.5 % cream, Apply  topically to the appropriate area as directed As Needed (45-60 minutes prior to port access.  Cover with saran/plastic wrap.)., Disp: 30 g, Rfl: 3  •  metFORMIN ER (GLUCOPHAGE-XR) 500 MG 24 hr tablet, Take 2 tablets by mouth 2 (Two) Times a Day., Disp: 360 tablet, Rfl: 3  •  mirtazapine (REMERON) 7.5 MG tablet, Take 1 tablet by mouth Every Night. For appetite, Disp: 90 tablet, Rfl: 3  •  Morphine (MS CONTIN) 15 MG 12 hr tablet, Take 3 tablets by mouth 2 (Two) Times a Day., Disp: 180 tablet, Rfl: 0  •  ondansetron (ZOFRAN) 8 MG tablet, Take 1 tablet by mouth Every 8 (Eight) Hours As Needed for Nausea for up to 60 doses., Disp: 30 tablet, Rfl: 5  •  oxyCODONE (ROXICODONE) 10 MG tablet, Take 1 tablet by mouth 4 (Four) Times a Day Before Meals & at Bedtime., Disp: 120 tablet, Rfl: 0  •  zolpidem (AMBIEN) 10 MG tablet, Take 1 tablet by mouth At Night As Needed for Sleep., Disp: 30 tablet, Rfl: 2    PHYSICAL EXAMINATION:   /84   Pulse 99   Temp 97.9 °F (36.6 °C) (Temporal)   Resp 20   Ht 177.8 cm (70\")   Wt 70.8 " kg (156 lb)   SpO2 98%   BMI 22.38 kg/m²    ECOG Performance Status: 1 - Symptomatic but completely ambulatory  General Appearance:  alert, cooperative, no apparent distress and appears stated age   Neurologic/Psychiatric: A&O x 3, gait steady, appropriate affect, strength 5/5 in all muscle groups   HEENT:  Normocephalic, without obvious abnormality, mucous membranes moist   Neck: Supple, symmetrical, trachea midline, no adenopathy;  No thyromegaly, masses, or tenderness   Lungs:   Clear to auscultation bilaterally; respirations regular, even, and unlabored bilaterally   Heart:  Regular rate and rhythm, no murmurs appreciated   Abdomen:   Soft, non-tender, non-distended and no organomegaly   Lymph nodes: No cervical, supraclavicular, inguinal or axillary adenopathy noted   Extremities: Normal, atraumatic; no clubbing, cyanosis, or edema    Skin: No rashes, ulcers, or suspicious lesions noted     Admission on 11/15/2019, Discharged on 11/16/2019   No results displayed because visit has over 200 results.           Ct Angiogram Head    Result Date: 11/15/2019  Narrative: CT ANGIOGRAM, HEAD AND NECK, 11/15/2019 HISTORY: 69-year-old male in the ED with new onset right-sided weakness and aphasia. Stroke protocol examination. TECHNIQUE: CT angiogram of the head and neck with contrast. 3-D postprocessing was performed and reviewed. Evaluation for a significant carotid arterial stenosis is based on NASCET criteria. Radiation dose reduction techniques included automated exposure control. Radiation audit for known CT and nuclear cardiology exams in the last 12 months: 4. COMPARISON: CT cerebral perfusion, 11/15/2019. CTA NECK: Normal aortic arch branching pattern with no proximal great vessel stenosis. The vertebral arteries are widely patent and codominant. Cervical carotid bifurcations are normal, without evidence of carotid plaque and 0% stenosis in both internal carotid arteries by NASCET criteria. CTA HEAD: Left MCA  occlusion beyond the bifurcation involving the inferior division. This corresponds to the region of ischemia and smaller core infarction seen on the earlier CT perfusion study. No additional evidence of significant large vessel occlusion or high-grade stenosis involving the intracranial anterior and posterior circulation vessels. No visible intracranial aneurysm or vascular malformation. Dural venous sinuses appear patent.     Impression: 1.  Inferior division left MCA occlusion. 2.  No additional large vessel occlusion or stenosis intracranially. 3.  Widely patent cervical carotid bifurcations. Signer Name: Raymond Anaya MD  Signed: 11/15/2019 3:32 AM  Workstation Name: [facility]  Radiology [facility]    Ct Head Without Contrast    Result Date: 11/16/2019  Narrative: CT Head WO HISTORY: 69-year-old male with weakness and aphasia today status post cerebral infarction and TPA given 24 hours ago. TECHNIQUE: Routine noncontrast head CT. Radiation dose reduction techniques included automated exposure control or exposure modulation based on body size. Count of known CT and cardiac nuc med studies performed in previous 12 months: None. COMPARISON: CT head 11/15/2019 FINDINGS: No hemorrhage, acute infarction, mass lesion, or abnormal extra-axial fluid collection. No midline shift or focal mass effect. Ventricular system is normal in size and configuration. Mild generalized atrophy and chronic small vessel disease throughout the supratentorial white matter. No acute osseous abnormality. Visualized paranasal sinuses are clear. Visualized mastoid air cells are clear.     Impression: No acute intracranial abnormality. Stable senescent changes. Signer Name: Gurwinder Carranza MD  Signed: 11/16/2019 3:14 AM  Workstation Name: [facility]  [facility]    Ct Angiogram Neck    Result Date: 11/15/2019  Narrative: CT ANGIOGRAM, HEAD AND NECK, 11/15/2019 HISTORY: 69-year-old male in the ED  with new onset right-sided weakness and aphasia. Stroke protocol examination. TECHNIQUE: CT angiogram of the head and neck with contrast. 3-D postprocessing was performed and reviewed. Evaluation for a significant carotid arterial stenosis is based on NASCET criteria. Radiation dose reduction techniques included automated exposure control. Radiation audit for known CT and nuclear cardiology exams in the last 12 months: 4. COMPARISON: CT cerebral perfusion, 11/15/2019. CTA NECK: Normal aortic arch branching pattern with no proximal great vessel stenosis. The vertebral arteries are widely patent and codominant. Cervical carotid bifurcations are normal, without evidence of carotid plaque and 0% stenosis in both internal carotid arteries by NASCET criteria. CTA HEAD: Left MCA occlusion beyond the bifurcation involving the inferior division. This corresponds to the region of ischemia and smaller core infarction seen on the earlier CT perfusion study. No additional evidence of significant large vessel occlusion or high-grade stenosis involving the intracranial anterior and posterior circulation vessels. No visible intracranial aneurysm or vascular malformation. Dural venous sinuses appear patent.     Impression: 1.  Inferior division left MCA occlusion. 2.  No additional large vessel occlusion or stenosis intracranially. 3.  Widely patent cervical carotid bifurcations. Signer Name: Raymond Anaya MD  Signed: 11/15/2019 3:32 AM  Workstation Name: [facility]  [facility]    Xr Chest 1 View    Result Date: 11/15/2019  Narrative: CHEST X-RAY, 11/15/2019  HISTORY:  69-year-old male with history of small cell lung cancer, currently on chemotherapy. He presents to the ED with new onset weakness, aphasia. Stroke protocol.  TECHNIQUE: AP portable upright chest x-ray. COMPARISON: *  Chest x-ray, 10/10/2019. FINDINGS: There is a small caliber pleural drainage catheter at the right lung base. Loculated  right mid chest pleural effusion has resolved since the prior exam, but there may be a tiny pneumothorax in this region. The lungs are clear. Right suprahilar mass appears smaller. Heart size and pulmonary vascularity are normal. Central venous port catheter in good position.     Impression: 1. Pleural drain at the right lung base. Decreased right pleural effusion. Probable tiny pneumothorax along the lateral right midlung. 2. The lungs are clear. 3. Decreased size of right suprahilar mass since 10/10/2019. Signer Name: Raymond Anaya MD  Signed: 11/15/2019 3:12 AM  Workstation Name: [facility]  Radiology [facility]    Ct Head Without Contrast Stroke Protocol    Result Date: 11/15/2019  Narrative: CT HEAD, NONCONTRAST, STROKE PROTOCOL, 11/15/2019 HISTORY: 69-year-old male in the ED with new onset right-sided weakness. Stroke protocol exam. TECHNIQUE: CT imaging of the head without IV contrast. Radiation dose reduction techniques included automated exposure control. Radiation audit for CT and nuclear cardiology exams in the last 12 months: 4. *  CT exam time, 02:21. *  CT on line in PACS, 02:25. *  Stat report, 02:27. FINDINGS: No acute intracranial abnormality. Mild generalized age-appropriate cerebral volume loss. Mild diffuse low-attenuation white matter changes, nonspecific but most typically seen in the setting of chronic small vessel disease. No evidence of intracranial hemorrhage, mass, mass effect, acute cerebral edema, extra-axial fluid collection or hydrocephalus.     Impression: 1. No acute intracranial abnormality. 2. Mild diffuse chronic changes as noted above. NOTIFICATION: Critical Value/emergent results were discussed by telephone with the CT technologist and relayed directly to the ED physician present in the CT department 02:27 hours on 11/15/2019. Signer Name: Raymond Anaya MD  Signed: 11/15/2019 2:29 AM  Workstation Name: [facility]  Radiology Specialists   [place]    Ct Cerebral Perfusion With & Without Contrast    Result Date: 11/15/2019  Narrative: CT CEREBRAL PERFUSION, WITH AND WITHOUT CONTRAST, 11/15/2019 HISTORY: 69-year-old male in the ED with new onset right-sided weakness and aphasia. Stroke protocol examination. TECHNIQUE: Axial CT images of the brain without and with intravenous contrast using cerebral perfusion protocol. Post-processing parametric maps were created using RAPID software and reviewed. Radiation dose reduction techniques included automated exposure control or exposure modulation based on body size. CT and nuclear cardiology exams in the last 12 months: 4. FINDINGS: Arterial input function is optimal. Large region of ischemia in the left temporal and parietal lobe, likely in the territory of the inferior division left MCA. PERFUSION PARAMETERS: *  Ischemic tissue volume (Tmax > 6 sec.): 70 mL. *  Infarct core volume (CBF < 30%): 16 mL. *  Mismatch (penumbra) volume: 54 mL. *  Mismatch ratio: 4.4. COLLATERAL CIRCULATION PARAMETERS: *  Volume poor collateral perfusion (Tmax > 10 sec.): 37 mL. *  Hypoperfusion index (Tmax >10 sec./Tmax > 6 sec.): 0.5. *  CBV Index (rCBV in Tmax > 6 sec. region): 0.8.     Impression: 1.  Regional ischemia in the posterior left temporal lobe and parietal lobe of 70 cc with smaller region of completed core infarction (16 cc). 2.  CTA examination to follow. Signer Name: Raymond Anaya MD  Signed: 11/15/2019 3:25 AM  Workstation Name: [facility]  [facility] [place]      ASSESSMENT: The patient is a very pleasant 69 y.o. male  with extensive stage small cell lung cancer  Problem list:  1.  Extensive stage small cell lung cancer:  A.  Presented with abdominal pain and shortness of breath  B.  CT chest abdomen pelvis revealed right upper lobe lung mass with multiple pleural-based masses rectal effusion and diffuse liver metastases  C.  Status post bronchoscopy with biopsy done on October 4 2019  that revealed small cell lung cancer  D.  Started carboplatin and etoposide cycle #1 October 9, 2019, status post 2 cycle  2.  Right pleural effusion:  A.  Status post thoracentesis done on October 6, 2019  B.  Status post Pleurx catheter placement done by Dr. Sky October 10, 2019  3.    History of hepatitis C:  A.  Status post treatment with interferon.  B.  Hepatitis C antibodies positive but viral load quantitative serology negative  4.  Cancer related pain  5.  Constipation  6.  Chemotherapy induced nausea  7.  Type 2 diabetes  8.  Left MCA embolic stroke    PLAN:  1.  I will proceed with treatment as scheduled today carboplatin and etoposide with Tecentriq, cycle #3.  2.  The patient follow-up with us in 3 weeks for cycle #4.  3.  I reviewed the patient's scans prior to cycle #4.  If he has good response to treatment will finish 4 cycles this would be followed by Tecentriq maintenance.  This will be ordered prior to return.  4.  I will continue to monitor patient blood work including blood counts kidney function liver function and electrolytes.  5.  We will continue to drain right pleural effusion through Pleurx catheter.  Patient was advised not to drain more than 600 cc at a time.    6.  We will continue Zofran as needed for chemotherapy induced nausea.  7.  We will continue port care.  Patient was given prescription for EMLA cream.  8. We discussed potential side effects of immunotherapy including but not limited to immune mediated reactions with thyroiditis, pneumonitis, hepatitis, colitis, rash, and electrolytes abnormalities, fatigue, multiorgan failure, and possibly death.  9.  We will continue extended release morphine as well as oxycodone short acting for cancer related pain.  This has been prescribed by the palliative care clinic.  10.  We will continue bowel regimen for opiate-induced constipation.  11.  Continue metformin for type 2 diabetes.  12. We will continue Eliquis 5 mg twice daily.       Krishna Argueta MD  11/18/2019   I, Krishna Ren MD, personally performed the services described in this documentation as scribed by the above named individual in my presence, and it is both accurate and complete.  11/18/2019  11:57 AM

## 2019-11-19 NOTE — PROGRESS NOTES
"Onc Nutrition    Patient: Calin Portillo  YOB: 1950    Weight: 156# - down ~3# x 3 weeks  Nutrition Symptoms: taste change, altered appetite / lack of desire to eat    Follow up with patient and his wife during his infusion appointment.  Patient continues to complain of taste changes and poor appetite / lack of desire to eat.  He reports he is drinking Boost Plus or Ensure Plus to aid with oral intake.    Reviewed the importance of good nutritional intake during his treatments.  Encouraged him to increase his intake of foods and offered several snack ideas he may find more appealing at this time.  Also encouraged him to continue drinking nutritional supplements and suggested to try [place] Instant Breakfast as another option.  Provided [place] Instant Breakfast sample, coupon, and recipe guide.  Advised him to be using the baking soda / salt mouth rinse before he eats to aid with taste changes.  Provided and reviewed written diet materials \"Soft and Moist High Protein Menu Ideas\" and \"Increasing Calories and Protein\" to reinforce information discussed.    Answered their questions and both voiced understanding of information discussed.  Encouraged to call RD with questions.  Will monitor as needed during his treatment course.    Olivia Laurent RD  11/19/19          "

## 2019-11-19 NOTE — OUTREACH NOTE
Stroke Week 1 Survey      Responses   Facility patient discharged from?  [facility]   Does the patient have one of the following disease processes/diagnoses(primary or secondary)?  Stroke (TIA)   Is there a successful TCM telephone encounter documented?  Yes          Nicole Pritchett RN

## 2019-11-19 NOTE — OUTREACH NOTE
TCM call completed.  See TCM flowsheet for details.   Is on his way to chemo today.  Is up and about, although he says he does not drive anymore.  Eating and drinking and states appetite is actually better.  No issues with bowels or bladder.  Speech was clear and appropriate.  Discussed new medicines and he states he has 81mg aspirin at home, has samples of Eliquis and will  atorvastatin at pharmacy to start.  He denied any needs at present, but declined making hospital f/u with Dr. Alcantara.  States they talk almost daily and he knows to call for any problems. He did appreciate the call.

## 2019-11-26 NOTE — OUTREACH NOTE
Stroke Week 2 Survey      Responses   Facility patient discharged from?  [facility]   Does the patient have one of the following disease processes/diagnoses(primary or secondary)?  Stroke (TIA)   Week 2 attempt successful?  No   Unsuccessful attempts  Attempt 1          Hollie Nugent RN

## 2019-11-27 NOTE — OUTREACH NOTE
Stroke Week 2 Survey      Responses   Facility patient discharged from?  [place]   Does the patient have one of the following disease processes/diagnoses(primary or secondary)?  Stroke (TIA)   Week 2 attempt successful?  Yes   Call start time  1415   Rescheduled  Rescheduled-pt requested [someone at door will reschedule]   Call end time  1417   General alerts for this patient  weakness sleepy all time.    Meds reviewed with patient/caregiver?  Yes   Is the patient taking all medications as directed (includes completed medication regime)?  Yes   Has the patient kept scheduled appointments due by today?  Yes          Tia Hassan RN

## 2019-11-29 NOTE — OUTREACH NOTE
Stroke Week 2 Survey      Responses   Facility patient discharged from?  [facility]   Does the patient have one of the following disease processes/diagnoses(primary or secondary)?  Stroke (TIA)   Week 2 attempt successful?  Yes   Call start time  0919   Call end time  0924   Discharge diagnosis  AIS L MCA s/p tPA + thrombectomy   Meds reviewed with patient/caregiver?  Yes   Is the patient having any side effects they believe may be caused by any medication additions or changes?  No   Is the patient taking all medications as directed (includes completed medication regime)?  Yes   Has the patient kept scheduled appointments due by today?  Yes   Psychosocial issues?  No   Does the patient require any assistance with activities of daily living such as eating, bathing, dressing, walking, etc.?  Yes   Does the patient have any residual symptoms from stroke/TIA?  No   Does the patient understand the diet ordered at discharge?  Yes   What is the patient's perception of their health status since discharge?  Same   Nursing interventions  Nurse provided patient education   Is the patient able to teach back FAST for Stroke?  Yes   Is the patient/caregiver able to teach back the risk factors for a stroke?  High blood pressure-goal below 120/80, Diabetes   Week 2 call completed?  Yes   Revoked  No further contact(revokes)-requires comment   Graduated/Revoked comments  Patient was very short and upset with questions. Requests no more calls.           Monica Sethi RN

## 2019-12-09 NOTE — PROGRESS NOTES
DATE OF VISIT: 12/9/2019    REASON FOR VISIT: Followup for extensive stage small cell lung cancer     HISTORY OF PRESENT ILLNESS: The patient is a very pleasant 69 y.o. male  with past medical history significant for extensive stage small cell lung cancer diagnosed October 4, 2019.  He was started on palliative treatment with carbo etoposide and Tecentriq October 9, 2019.  The patient is here today for scheduled follow-up visit with treatment, cycle #4.    SUBJECTIVE: The patient is here today with his wife.  He is doing fairly well.  He denies any fever chills night sweats.  Has been able to tolerate treatment he has been having mild fatigue.  His shortness of breath is better.  He is anxious about the scan results.    PAST MEDICAL HISTORY/SOCIAL HISTORY/FAMILY HISTORY: Reviewed by me and unchanged from my documentation done on 12/09/19.    Review of Systems   Constitutional: Positive for fatigue. Negative for activity change, appetite change, chills, fever and unexpected weight change.   HENT: Negative for hearing loss, mouth sores, nosebleeds, sore throat and trouble swallowing.    Eyes: Negative for visual disturbance.   Respiratory: Positive for cough and shortness of breath. Negative for chest tightness and wheezing.    Cardiovascular: Negative for chest pain, palpitations and leg swelling.   Gastrointestinal: Positive for nausea. Negative for abdominal distention, abdominal pain, blood in stool, constipation, diarrhea, rectal pain and vomiting.   Endocrine: Negative for cold intolerance and heat intolerance.   Genitourinary: Negative for difficulty urinating, dysuria, frequency and urgency.   Musculoskeletal: Negative for arthralgias, back pain, gait problem, joint swelling and myalgias.   Skin: Negative for rash.   Neurological: Negative for dizziness, tremors, syncope, weakness, light-headedness, numbness and headaches.   Hematological: Negative for adenopathy. Does not bruise/bleed easily.    Psychiatric/Behavioral: Negative for confusion, sleep disturbance and suicidal ideas. The patient is not nervous/anxious.          Current Outpatient Medications:   •  Apixaban (ELIQUIS PO), Take  by mouth., Disp: , Rfl:   •  aspirin 81 MG EC tablet, Take 1 tablet by mouth Daily., Disp: , Rfl:   •  atorvastatin (LIPITOR) 80 MG tablet, Take 1 tablet by mouth Every Night., Disp: 30 tablet, Rfl: 3  •  Empagliflozin-Linagliptin (GLYXAMBI) 25-5 MG tablet, Take 0.5 tablets by mouth Every Morning., Disp: 28 tablet, Rfl: 0  •  fluticasone (FLONASE) 50 MCG/ACT nasal spray, 1 spray into the nostril(s) as directed by provider 2 (Two) Times a Day., Disp: 3 bottle, Rfl: 3  •  Fluticasone-Umeclidin-Vilant (TRELEGY ELLIPTA) 100-62.5-25 MCG/INH aerosol powder , Inhale 1 each Every Morning., Disp: 28 each, Rfl: 0  •  glimepiride (AMARYL) 2 MG tablet, Take 1 tablet by mouth Every Morning Before Breakfast., Disp: 90 tablet, Rfl: 3  •  lidocaine-prilocaine (EMLA) 2.5-2.5 % cream, Apply  topically to the appropriate area as directed As Needed (45-60 minutes prior to port access.  Cover with saran/plastic wrap.)., Disp: 30 g, Rfl: 3  •  metFORMIN ER (GLUCOPHAGE-XR) 500 MG 24 hr tablet, Take 2 tablets by mouth 2 (Two) Times a Day., Disp: 360 tablet, Rfl: 3  •  mirtazapine (REMERON) 15 MG tablet, Take 1 tablet by mouth Every Night. For appetite, Disp: 90 tablet, Rfl: 3  •  Morphine (MS CONTIN) 15 MG 12 hr tablet, Take 3 tablets by mouth 2 (Two) Times a Day., Disp: 180 tablet, Rfl: 0  •  ondansetron (ZOFRAN) 8 MG tablet, Take 1 tablet by mouth Every 8 (Eight) Hours As Needed for Nausea for up to 60 doses., Disp: 30 tablet, Rfl: 5  •  oxyCODONE (ROXICODONE) 10 MG tablet, Take 1 tablet by mouth 4 (Four) Times a Day Before Meals & at Bedtime., Disp: 120 tablet, Rfl: 0  •  zolpidem (AMBIEN) 10 MG tablet, Take 1 tablet by mouth At Night As Needed for Sleep., Disp: 30 tablet, Rfl: 2    PHYSICAL EXAMINATION:   /72   Pulse 103   Temp 97.1  "°F (36.2 °C)   Resp 16   Ht 177.8 cm (70\")   Wt 71.2 kg (157 lb)   SpO2 93%   BMI 22.53 kg/m²    ECOG Performance Status: 1 - Symptomatic but completely ambulatory  General Appearance:  alert, cooperative, no apparent distress and appears stated age   Neurologic/Psychiatric: A&O x 3, gait steady, appropriate affect, strength 5/5 in all muscle groups   HEENT:  Normocephalic, without obvious abnormality, mucous membranes moist   Neck: Supple, symmetrical, trachea midline, no adenopathy;  No thyromegaly, masses, or tenderness   Lungs:   Clear to auscultation bilaterally; respirations regular, even, and unlabored bilaterally   Heart:  Regular rate and rhythm, no murmurs appreciated   Abdomen:   Soft, non-tender, non-distended and no organomegaly   Lymph nodes: No cervical, supraclavicular, inguinal or axillary adenopathy noted   Extremities: Normal, atraumatic; no clubbing, cyanosis, or edema    Skin: No rashes, ulcers, or suspicious lesions noted     No visits with results within 2 Week(s) from this visit.   Latest known visit with results is:   Hospital Outpatient Visit on 11/18/2019   Component Date Value Ref Range Status   • Glucose 11/18/2019 201* 65 - 99 mg/dL Final   • BUN 11/18/2019 14  8 - 23 mg/dL Final   • Creatinine 11/18/2019 0.87  0.76 - 1.27 mg/dL Final   • Sodium 11/18/2019 141  136 - 145 mmol/L Final   • Potassium 11/18/2019 3.9  3.5 - 5.2 mmol/L Final   • Chloride 11/18/2019 102  98 - 107 mmol/L Final   • CO2 11/18/2019 27.0  22.0 - 29.0 mmol/L Final   • Calcium 11/18/2019 8.8  8.6 - 10.5 mg/dL Final   • Total Protein 11/18/2019 6.4  6.0 - 8.5 g/dL Final   • Albumin 11/18/2019 3.30* 3.50 - 5.20 g/dL Final   • ALT (SGPT) 11/18/2019 10  1 - 41 U/L Final   • AST (SGOT) 11/18/2019 16  1 - 40 U/L Final   • Alkaline Phosphatase 11/18/2019 181* 39 - 117 U/L Final   • Total Bilirubin 11/18/2019 0.2  0.2 - 1.2 mg/dL Final   • eGFR Non  Amer 11/18/2019 87  >60 mL/min/1.73 Final   • Globulin " 11/18/2019 3.1  gm/dL Final   • A/G Ratio 11/18/2019 1.1  g/dL Final   • BUN/Creatinine Ratio 11/18/2019 16.1  7.0 - 25.0 Final   • Anion Gap 11/18/2019 12.0  5.0 - 15.0 mmol/L Final   • TSH 11/18/2019 2.120  0.270 - 4.200 uIU/mL Final   • T3, Free 11/18/2019 2.14  2.00 - 4.40 pg/mL Final   • Free T4 11/18/2019 1.08  0.93 - 1.70 ng/dL Final   • WBC 11/18/2019 19.70* 3.40 - 10.80 10*3/mm3 Final   • RBC 11/18/2019 3.33* 4.14 - 5.80 10*6/mm3 Final   • Hemoglobin 11/18/2019 10.8* 13.0 - 17.7 g/dL Final   • Hematocrit 11/18/2019 33.4* 37.5 - 51.0 % Final   • RDW 11/18/2019 19.5* 12.3 - 15.4 % Final   • MCV 11/18/2019 100.1* 79.0 - 97.0 fL Final   • MCH 11/18/2019 32.3  26.6 - 33.0 pg Final   • MCHC 11/18/2019 32.3  31.5 - 35.7 g/dL Final   • MPV 11/18/2019 8.4  6.0 - 12.0 fL Final   • Platelets 11/18/2019 262  140 - 450 10*3/mm3 Final   • Neutrophil % 11/18/2019 86.5* 42.7 - 76.0 % Final   • Lymphocyte % 11/18/2019 11.0* 19.6 - 45.3 % Final   • Monocyte % 11/18/2019 2.5* 5.0 - 12.0 % Final   • Neutrophils, Absolute 11/18/2019 17.00* 1.70 - 7.00 10*3/mm3 Final   • Lymphocytes, Absolute 11/18/2019 2.20  0.70 - 3.10 10*3/mm3 Final   • Monocytes, Absolute 11/18/2019 0.50  0.10 - 0.90 10*3/mm3 Final   • Creatinine 11/18/2019 0.80  0.60 - 1.30 mg/dL Final    Serial Number: [id number]:  [id number]        Ct Angiogram Head    Result Date: 11/15/2019  Narrative: CT ANGIOGRAM, HEAD AND NECK, 11/15/2019 HISTORY: 69-year-old male in the ED with new onset right-sided weakness and aphasia. Stroke protocol examination. TECHNIQUE: CT angiogram of the head and neck with contrast. 3-D postprocessing was performed and reviewed. Evaluation for a significant carotid arterial stenosis is based on NASCET criteria. Radiation dose reduction techniques included automated exposure control. Radiation audit for known CT and nuclear cardiology exams in the last 12 months: 4. COMPARISON: CT cerebral perfusion, 11/15/2019. CTA NECK: Normal aortic arch  branching pattern with no proximal great vessel stenosis. The vertebral arteries are widely patent and codominant. Cervical carotid bifurcations are normal, without evidence of carotid plaque and 0% stenosis in both internal carotid arteries by NASCET criteria. CTA HEAD: Left MCA occlusion beyond the bifurcation involving the inferior division. This corresponds to the region of ischemia and smaller core infarction seen on the earlier CT perfusion study. No additional evidence of significant large vessel occlusion or high-grade stenosis involving the intracranial anterior and posterior circulation vessels. No visible intracranial aneurysm or vascular malformation. Dural venous sinuses appear patent.     Impression: 1.  Inferior division left MCA occlusion. 2.  No additional large vessel occlusion or stenosis intracranially. 3.  Widely patent cervical carotid bifurcations. Signer Name: Raymond Anaya MD  Signed: 11/15/2019 3:32 AM  Workstation Name: [email or web address]-  [facility]    Ct Head Without Contrast    Result Date: 11/16/2019  Narrative: CT Head WO HISTORY: 69-year-old male with weakness and aphasia today status post cerebral infarction and TPA given 24 hours ago. TECHNIQUE: Routine noncontrast head CT. Radiation dose reduction techniques included automated exposure control or exposure modulation based on body size. Count of known CT and cardiac nuc med studies performed in previous 12 months: None. COMPARISON: CT head 11/15/2019 FINDINGS: No hemorrhage, acute infarction, mass lesion, or abnormal extra-axial fluid collection. No midline shift or focal mass effect. Ventricular system is normal in size and configuration. Mild generalized atrophy and chronic small vessel disease throughout the supratentorial white matter. No acute osseous abnormality. Visualized paranasal sinuses are clear. Visualized mastoid air cells are clear.     Impression: No acute intracranial abnormality. Stable  senescent changes. Signer Name: Gurwinder Carranza MD  Signed: 11/16/2019 3:14 AM  Workstation Name: [email or web address]-  [facility]    Ct Angiogram Neck    Result Date: 11/15/2019  Narrative: CT ANGIOGRAM, HEAD AND NECK, 11/15/2019 HISTORY: 69-year-old male in the ED with new onset right-sided weakness and aphasia. Stroke protocol examination. TECHNIQUE: CT angiogram of the head and neck with contrast. 3-D postprocessing was performed and reviewed. Evaluation for a significant carotid arterial stenosis is based on NASCET criteria. Radiation dose reduction techniques included automated exposure control. Radiation audit for known CT and nuclear cardiology exams in the last 12 months: 4. COMPARISON: CT cerebral perfusion, 11/15/2019. CTA NECK: Normal aortic arch branching pattern with no proximal great vessel stenosis. The vertebral arteries are widely patent and codominant. Cervical carotid bifurcations are normal, without evidence of carotid plaque and 0% stenosis in both internal carotid arteries by NASCET criteria. CTA HEAD: Left MCA occlusion beyond the bifurcation involving the inferior division. This corresponds to the region of ischemia and smaller core infarction seen on the earlier CT perfusion study. No additional evidence of significant large vessel occlusion or high-grade stenosis involving the intracranial anterior and posterior circulation vessels. No visible intracranial aneurysm or vascular malformation. Dural venous sinuses appear patent.     Impression: 1.  Inferior division left MCA occlusion. 2.  No additional large vessel occlusion or stenosis intracranially. 3.  Widely patent cervical carotid bifurcations. Signer Name: Raymond Anaya MD  Signed: 11/15/2019 3:32 AM  Workstation Name: [email or web address]  [facility]    Xr Chest 1 View    Result Date: 11/15/2019  Narrative: CHEST X-RAY, 11/15/2019  HISTORY:  69-year-old male with history of small cell lung  cancer, currently on chemotherapy. He presents to the ED with new onset weakness, aphasia. Stroke protocol.  TECHNIQUE: AP portable upright chest x-ray. COMPARISON: *  Chest x-ray, 10/10/2019. FINDINGS: There is a small caliber pleural drainage catheter at the right lung base. Loculated right mid chest pleural effusion has resolved since the prior exam, but there may be a tiny pneumothorax in this region. The lungs are clear. Right suprahilar mass appears smaller. Heart size and pulmonary vascularity are normal. Central venous port catheter in good position.     Impression: 1. Pleural drain at the right lung base. Decreased right pleural effusion. Probable tiny pneumothorax along the lateral right midlung. 2. The lungs are clear. 3. Decreased size of right suprahilar mass since 10/10/2019. Signer Name: Raymond Anaya MD  Signed: 11/15/2019 3:12 AM  Workstation Name: [facility]  [facility]    Ct Head Without Contrast Stroke Protocol    Result Date: 11/15/2019  Narrative: CT HEAD, NONCONTRAST, STROKE PROTOCOL, 11/15/2019 HISTORY: 69-year-old male in the ED with new onset right-sided weakness. Stroke protocol exam. TECHNIQUE: CT imaging of the head without IV contrast. Radiation dose reduction techniques included automated exposure control. Radiation audit for CT and nuclear cardiology exams in the last 12 months: 4. *  CT exam time, 02:21. *  CT on line in PACS, 02:25. *  Stat report, 02:27. FINDINGS: No acute intracranial abnormality. Mild generalized age-appropriate cerebral volume loss. Mild diffuse low-attenuation white matter changes, nonspecific but most typically seen in the setting of chronic small vessel disease. No evidence of intracranial hemorrhage, mass, mass effect, acute cerebral edema, extra-axial fluid collection or hydrocephalus.     Impression: 1. No acute intracranial abnormality. 2. Mild diffuse chronic changes as noted above. NOTIFICATION: Critical Value/emergent  results were discussed by telephone with the CT technologist and relayed directly to the ED physician present in the CT department 02:27 hours on 11/15/2019. Signer Name: Raymond Anaya MD  Signed: 11/15/2019 2:29 AM  Workstation [facility]: [facility]    Ct Cerebral Perfusion With & Without Contrast    Result Date: 11/15/2019  Narrative: CT CEREBRAL PERFUSION, WITH AND WITHOUT CONTRAST, 11/15/2019 HISTORY: 69-year-old male in the ED with new onset right-sided weakness and aphasia. Stroke protocol examination. TECHNIQUE: Axial CT images of the brain without and with intravenous contrast using cerebral perfusion protocol. Post-processing parametric maps were created using RAPID software and reviewed. Radiation dose reduction techniques included automated exposure control or exposure modulation based on body size. CT and nuclear cardiology exams in the last 12 months: 4. FINDINGS: Arterial input function is optimal. Large region of ischemia in the left temporal and parietal lobe, likely in the territory of the inferior division left MCA. PERFUSION PARAMETERS: *  Ischemic tissue volume (Tmax > 6 sec.): 70 mL. *  Infarct core volume (CBF < 30%): 16 mL. *  Mismatch (penumbra) volume: 54 mL. *  Mismatch ratio: 4.4. COLLATERAL CIRCULATION PARAMETERS: *  Volume poor collateral perfusion (Tmax > 10 sec.): 37 mL. *  Hypoperfusion index (Tmax >10 sec./Tmax > 6 sec.): 0.5. *  CBV Index (rCBV in Tmax > 6 sec. region): 0.8.     Impression: 1.  Regional ischemia in the posterior left temporal lobe and parietal lobe of 70 cc with smaller region of completed core infarction (16 cc). 2.  CTA examination to follow. Signer Name: Raymond Anaya MD  Signed: 11/15/2019 3:25 AM  Workstation Name: [facility]      ASSESSMENT: The patient is a very pleasant 69 y.o. male  with extensive stage small cell lung cancer  Problem list:  1.  Extensive stage small cell  lung cancer:  A.  Presented with abdominal pain and shortness of breath  B.  CT chest abdomen pelvis revealed right upper lobe lung mass with multiple pleural-based masses rectal effusion and diffuse liver metastases  C.  Status post bronchoscopy with biopsy done on October 4 2019 that revealed small cell lung cancer  D.  Started carboplatin and etoposide cycle #1 October 9, 2019, status post 3 cycle  2.  Right pleural effusion:  A.  Status post thoracentesis done on October 6, 2019  B.  Status post Pleurx catheter placement done by Dr. Sky October 10, 2019  3.    History of hepatitis C:  A.  Status post treatment with interferon.  B.  Hepatitis C antibodies positive but viral load quantitative serology negative  4.  Cancer related pain  5.  Constipation  6.  Chemotherapy induced nausea  7.  Type 2 diabetes  8.  Left MCA embolic stroke    PLAN:  1.  I will proceed with treatment as scheduled today carboplatin and etoposide with Tecentriq, cycle #4.  2.  The patient follow-up with us in 3 weeks for cycle #5.  3.  I reviewed the patient's scans.  I have compared the current films to previous studies.  I explained the patient having good response to treatment.  Repeat scans in 3 months which would be due March 2020.  4.  I will continue to monitor patient blood work including blood counts kidney function liver function and electrolytes.  5.  We will continue to drain right pleural effusion through Pleurx catheter.  Patient was advised not to drain more than 600 cc at a time.    6.  We will continue Zofran as needed for chemotherapy induced nausea.  7.  We will continue port care.  Patient was given prescription for EMLA cream.  8. We discussed potential side effects of immunotherapy including but not limited to immune mediated reactions with thyroiditis, pneumonitis, hepatitis, colitis, rash, and electrolytes abnormalities, fatigue, multiorgan failure, and possibly death.  9.  We will continue extended release  morphine as well as oxycodone short acting for cancer related pain.  This has been prescribed by the palliative care clinic.  10.  We will continue bowel regimen for opiate-induced constipation.  11.  Continue metformin for type 2 diabetes.  12. We will continue Eliquis 5 mg twice daily.      Krishna Ren MD  12/9/2019   1:03 PM

## 2019-12-31 NOTE — CODE DOCUMENTATION
Pt informed of need for blood. Offered observation admission, but pt would like to wait and receive in infusion on thursday

## 2019-12-31 NOTE — PROGRESS NOTES
"Patient Information  Calin Portillo                                                                                          [address] [place] [address]      1950  [unfilled]  [unfilled]    Chief Complaint   Patient presents with   • Follow-up     Follow up for pleural effusion,complains of pleurx cath not draining. Has some shortness of breath.   • Pleural Effusion       History of Present Illness: Patient seen today in the office for follow-up of a right Pleurx catheter as well as a left subclavian Port-A-Cath was placed for stage IV small cell lung cancer with malignant pleural effusion.  The Pleurx catheter was placed over a month ago and the patient is been draining the catheter every day to 2 days with 3 to 400 mL's but recently there is been no further drainage and night getting short of breath and concerned about the Pleurx catheter.  He supposed to see Dr. Ren later today for follow-up for the stage IV small cell lung cancer.    Vitals:    12/31/19 0925   BP: 124/67   BP Location: Right arm   Patient Position: Sitting   Pulse: 118   Temp: 97.9 °F (36.6 °C)   SpO2: 97%   Weight: 69.4 kg (153 lb)   Height: 180.3 cm (71\")        Physical Exam patient appears somewhat anxious and short of breath.  Chest examination/auscultation revealed decreased breath sounds at the right base.  The remainder the examination revealed clear breath sounds although slightly diminished somewhat within both lung fields.    Lab/other results: PA lateral chest x-ray done today in the office shows some elevation of the right hemidiaphragm but no evidence of any significant pleural effusion of either the right or left pleural cavity.  A CT scan of the chest that was done on December 7, 2019 showed no evidence of any significant pleural effusion of either right or left chest there was multiple blastic lesions of the thoracic spine which is progressed over the previous CAT scan but the out axillary adenopathy and " middle and anterior mediastinal adenopathy had largely resolved  Assessment: #1.  Stage IV small cell lung cancer metastatic to liver right pleural space and the spine.  2.  Placement left subclavian Port-A-Cath and right Pleurx catheter approximately 6 weeks ago.  Minimal drainage from the Pleurx catheter at this time.  3.  Chemotherapy per Dr. Ren    Plan: I called Dr. Ren by telephone and told him about the patient being seen today in the office.  He will evaluate the patient also and review the chest x-ray that was done today.  I am going to look at the chest x-ray with the radiologist also today.  I do not think that the patient has any significant pleural fluid in the right chest.  We will formally plan to see the patient back at Dr. Ren's request    Chema Sandoval M.D.

## 2019-12-31 NOTE — PROGRESS NOTES
DATE OF VISIT: 12/31/2019    REASON FOR VISIT: Followup for extensive stage small cell lung cancer     HISTORY OF PRESENT ILLNESS: The patient is a very pleasant 69 y.o. male  with past medical history significant for extensive stage small cell lung cancer diagnosed October 4, 2019.  He was started on palliative treatment with carbo etoposide and Tecentriq October 9, 2019.  He completed cycle #4 of treatment on 12/9/2019. He was switched to maintenance Tecentriq on 12/31/2019. The patient is here today for scheduled follow up visit with treatment.     SUBJECTIVE: The patient is here today with his wife. He complains of feeling anxious as well as weak and more short of breath over the past few days. He is coughing some, and was worried that his Pleurx catheter was working properly,so he saw Dr. Sandoval this morning who did a chest x-ray that showed no fluid. He denies fever or chills. He has not been wearing his oxygen much because he thinks it makes it breathing worse. He notes shortness of breath with minimal exertion, but no significant shortness of breath at rest. He has not been eating well. The sight and smell of food makes him nauseated, however he is not using his nausea medication at this time. He has been taking his Eliquis as prescribed except for a couple missed doses (approximately 4 over a months time). He denies swelling or redness in his legs. He is having no chest pain.     PAST MEDICAL HISTORY/SOCIAL HISTORY/FAMILY HISTORY: Reviewed by me and unchanged from my documentation done on 12/31/19.    Review of Systems   Constitutional: Positive for appetite change, fatigue and unexpected weight change. Negative for activity change, chills and fever.   HENT: Negative for hearing loss, mouth sores, nosebleeds, sore throat and trouble swallowing.    Eyes: Negative for visual disturbance.   Respiratory: Positive for cough and shortness of breath. Negative for chest tightness and wheezing.    Cardiovascular:  Negative for chest pain, palpitations and leg swelling.   Gastrointestinal: Positive for nausea. Negative for abdominal distention, abdominal pain, blood in stool, constipation, diarrhea, rectal pain and vomiting.   Endocrine: Negative for cold intolerance and heat intolerance.   Genitourinary: Negative for difficulty urinating, dysuria, frequency and urgency.   Musculoskeletal: Negative for arthralgias, back pain, gait problem, joint swelling and myalgias.   Skin: Negative for rash.   Neurological: Negative for dizziness, tremors, syncope, weakness, light-headedness, numbness and headaches.   Hematological: Negative for adenopathy. Does not bruise/bleed easily.   Psychiatric/Behavioral: Negative for confusion, sleep disturbance and suicidal ideas. The patient is nervous/anxious.          Current Outpatient Medications:   •  apixaban (ELIQUIS) 5 MG tablet tablet, Take 1 tablet by mouth 2 (Two) Times a Day., Disp: 60 tablet, Rfl: 5  •  aspirin 81 MG EC tablet, Take 1 tablet by mouth Daily., Disp: , Rfl:   •  atorvastatin (LIPITOR) 80 MG tablet, Take 1 tablet by mouth Every Night., Disp: 30 tablet, Rfl: 3  •  Empagliflozin-Linagliptin (GLYXAMBI) 25-5 MG tablet, Take 0.5 tablets by mouth Every Morning., Disp: 28 tablet, Rfl: 0  •  fluticasone (FLONASE) 50 MCG/ACT nasal spray, 1 spray into the nostril(s) as directed by provider 2 (Two) Times a Day., Disp: 3 bottle, Rfl: 3  •  Fluticasone-Umeclidin-Vilant (TRELEGY ELLIPTA) 100-62.5-25 MCG/INH aerosol powder , Inhale 1 each Every Morning., Disp: 28 each, Rfl: 0  •  glimepiride (AMARYL) 2 MG tablet, Take 1 tablet by mouth Every Morning Before Breakfast., Disp: 90 tablet, Rfl: 3  •  lidocaine-prilocaine (EMLA) 2.5-2.5 % cream, Apply  topically to the appropriate area as directed As Needed (45-60 minutes prior to port access.  Cover with saran/plastic wrap.)., Disp: 30 g, Rfl: 3  •  metFORMIN ER (GLUCOPHAGE-XR) 500 MG 24 hr tablet, Take 2 tablets by mouth 2 (Two) Times a  Day., Disp: 360 tablet, Rfl: 3  •  mirtazapine (REMERON) 15 MG tablet, Take 1 tablet by mouth Every Night. For appetite, Disp: 90 tablet, Rfl: 3  •  Morphine (MS CONTIN) 15 MG 12 hr tablet, Take 3 tablets by mouth 2 (Two) Times a Day., Disp: 180 tablet, Rfl: 0  •  ondansetron (ZOFRAN) 8 MG tablet, Take 1 tablet by mouth Every 8 (Eight) Hours As Needed for Nausea for up to 60 doses., Disp: 30 tablet, Rfl: 5  •  oxyCODONE (ROXICODONE) 10 MG tablet, Take 1 tablet by mouth 4 (Four) Times a Day Before Meals & at Bedtime., Disp: 120 tablet, Rfl: 0  •  zolpidem (AMBIEN) 10 MG tablet, Take 1 tablet by mouth At Night As Needed for Sleep., Disp: 30 tablet, Rfl: 2    PHYSICAL EXAMINATION:   There were no vitals taken for this visit.   ECOG Performance Status: 1 - Symptomatic but completely ambulatory  General Appearance:  alert, cooperative, anxious   Neurologic/Psychiatric: A&O x 3, gait steady, appropriate affect, strength 5/5 in all muscle groups   HEENT:  Normocephalic, without obvious abnormality, mucous membranes moist   Neck: Supple, symmetrical, trachea midline, no adenopathy;  No thyromegaly, masses, or tenderness   Lungs:   Short of breathClear to auscultation bilaterally; respirations regular, even, and unlabored bilaterally   Heart:  Regular rate and rhythm, no murmurs appreciated   Abdomen:   Soft, non-tender, non-distended and no organomegaly   Lymph nodes: No cervical, supraclavicular, inguinal or axillary adenopathy noted   Extremities: Normal, atraumatic; no clubbing, cyanosis, or edema    Skin: No rashes, ulcers, or suspicious lesions noted     Orders Only on 12/19/2019   Component Date Value Ref Range Status   • WBC 12/19/2019 4.97  3.40 - 10.80 10*3/mm3 Final   • RBC 12/19/2019 2.57* 4.14 - 5.80 10*6/mm3 Final   • Hemoglobin 12/19/2019 8.6* 13.0 - 17.7 g/dL Final   • Hematocrit 12/19/2019 25.7* 37.5 - 51.0 % Final   • MCV 12/19/2019 100.0* 79.0 - 97.0 fL Final   • [facility] 12/19/2019 33.5* 26.6 - 33.0 pg Final    • MCHC 12/19/2019 33.5  31.5 - 35.7 g/dL Final   • RDW 12/19/2019 16.6* 12.3 - 15.4 % Final   • RDW-SD 12/19/2019 59.5* 37.0 - 54.0 fl Final   • MPV 12/19/2019 12.3* 6.0 - 12.0 fL Final   • Platelets 12/19/2019 113* 140 - 450 10*3/mm3 Final   • Glucose 12/19/2019 253* 65 - 99 mg/dL Final   • BUN 12/19/2019 21  8 - 23 mg/dL Final   • Creatinine 12/19/2019 0.71* 0.76 - 1.27 mg/dL Final   • Sodium 12/19/2019 134* 136 - 145 mmol/L Final   • Potassium 12/19/2019 4.7  3.5 - 5.2 mmol/L Final   • Chloride 12/19/2019 91* 98 - 107 mmol/L Final   • CO2 12/19/2019 26.9  22.0 - 29.0 mmol/L Final   • Calcium 12/19/2019 9.8  8.6 - 10.5 mg/dL Final   • Total Protein 12/19/2019 8.1  6.0 - 8.5 g/dL Final   • Albumin 12/19/2019 3.60  3.50 - 5.20 g/dL Final   • ALT (SGPT) 12/19/2019 15  1 - 41 U/L Final   • AST (SGOT) 12/19/2019 53* 1 - 40 U/L Final   • Alkaline Phosphatase 12/19/2019 168* 39 - 117 U/L Final   • Total Bilirubin 12/19/2019 0.5  0.2 - 1.2 mg/dL Final   • eGFR Non African Amer 12/19/2019 110  >60 mL/min/1.73 Final   • Globulin 12/19/2019 4.5  gm/dL Final   • A/G Ratio 12/19/2019 0.8  g/dL Final   • BUN/Creatinine Ratio 12/19/2019 29.6* 7.0 - 25.0 Final   • Anion Gap 12/19/2019 16.1* 5.0 - 15.0 mmol/L Final   • Hemoglobin A1C 12/19/2019 8.84* 4.80 - 5.60 % Final   • TSH 12/19/2019 3.320  0.270 - 4.200 uIU/mL Final   • Vitamin B-12 12/19/2019 >2,000* 211 - 946 pg/mL Final        Ct Chest With Contrast    Result Date: 12/9/2019  Narrative: EXAMINATION: CT CHEST W CONTRAST-  12/07/2019  INDICATION: f/u scan; C34.11-Malignant neoplasm of upper lobe, right bronchus or lung  TECHNIQUE: CT scan of the chest was performed with intravenous contrast. Images are displayed in the axial, sagittal and coronal projections.  The radiation dose reduction device was turned on for each scan per the ALARA (As Low as Reasonably Achievable) protocol.  COMPARISON: 10/09/2019  FINDINGS: There is no axillary lymphadenopathy. Right axillary  lymphadenopathy has resolved since previous examination. A right pleural effusion is significantly smaller. Bulky anterior or middle mediastinal lymphadenopathy as largely resolved with some residual anterior mediastinal adenopathy. There is no subcarinal or hilar adenopathy. Areas of right upper and lower lobe collapse have markedly improved.      Impression: There has been significant improvement without complete resolution of changes in the chest. Specifically right axillary lymphadenopathy has resolved, middle and anterior mediastinal lymphadenopathy has largely resolved. The right pleural effusion is smaller and areas of collapse in the right upper and right lower lobe have significantly improved. There are multiple blastic lesions in the thoracic spine which have progressed slightly.  D:  12/09/2019 E:  12/09/2019   This report was finalized on 12/9/2019 4:41 PM by Dr. Vinay Paniagua MD.      Ct Abdomen Pelvis With Contrast    Result Date: 12/9/2019  Narrative: EXAMINATION: CT ABDOMEN AND PELVIS WITH CONTRAST-12/07/2019:  INDICATION: F/U scan; C34.11-Malignant neoplasm of upper lobe, right bronchus or lung.  TECHNIQUE: CT scan of the abdomen and pelvis was performed following intravenous contrast.  The radiation dose reduction device was turned on for each scan per the ALARA (As Low as Reasonably Achievable) protocol.  COMPARISON: 10/05/2019.  FINDINGS: There are multiple low-dense lesions in the liver consistent with metastatic disease. However, the tumor burden in the liver has improved. The spleen is normal. There is no adrenal or pancreatic mass. There is no renal mass or obstruction. There is no ascites, aneurysm or retroperitoneal lymphadenopathy. There is no pelvic mass or fluid. There are scattered sclerotic lesions in the lumbar spine and pelvis which have progressed.      Impression: There has been development of relatively mild blastic disease in the lumbar spine and pelvis which was not present on  the previous examination. There is persistent metastatic disease in the liver, but there has been improvement since the previous examination.  D:  12/09/2019 E:  12/09/2019    This report was finalized on 12/9/2019 4:42 PM by Dr. Vinay Paniagua MD.        ASSESSMENT: The patient is a very pleasant 69 y.o. male  with extensive stage small cell lung cancer  Problem list:  1.  Extensive stage small cell lung cancer:  A.  Presented with abdominal pain and shortness of breath  B.  CT chest abdomen pelvis revealed right upper lobe lung mass with multiple pleural-based masses rectal effusion and diffuse liver metastases  C.  Status post bronchoscopy with biopsy done on October 4 2019 that revealed small cell lung cancer  D.  Started carboplatin and etoposide cycle #1 October 9, 2019, status post 4 cycles  E. Switched to maintenance Tecentriq on 12/31/2019.   2.  Right pleural effusion:  A.  Status post thoracentesis done on October 6, 2019  B.  Status post Pleurx catheter placement done by Dr. Sky October 10, 2019  3.    History of hepatitis C:  A.  Status post treatment with interferon.  B.  Hepatitis C antibodies positive but viral load quantitative serology negative  4.  Cancer related pain  5.  Constipation  6.  Chemotherapy induced nausea  7.  Type 2 diabetes  8.  Left MCA embolic stroke  9.  Shortness of breath    PLAN:  1.  We will proceed with treatment today using Tecentriq maintenance, cycle #5 of treatment.   2.  The patient follow-up with us in 3 weeks for cycle #6.  3. The patient will need repeat CAT scans in 3 months which would be due March 2020.  4.  We will continue to monitor the patient's blood work including blood counts, kidney function, liver function, and electrolytes.  5.  We will continue to drain right pleural effusion through Pleurx catheter.  Recently the patient has had no fluid output.  He was seen by Dr. Sandoval this morning who did a chest x-ray to verify placement as well as confirmed that  there is no visible residual pleural effusion at this time.  I reassured the patient that this is an expected finding as he responds to treatment.    6.  We will continue Zofran as needed for chemotherapy induced nausea.  I encouraged the patient to use this more often as he is still having treatment induced nausea that is impairing his ability to eat.  7.  We will continue port care with use of EMLA cream prior to port access.  8. We discussed potential side effects of immunotherapy including but not limited to immune mediated reactions with thyroiditis, pneumonitis, hepatitis, colitis, rash, and electrolytes abnormalities, fatigue, multiorgan failure, and possibly death.  9.  We will continue extended release morphine as well as oxycodone short acting for cancer related pain.  This has been prescribed by the palliative care clinic.  10.  We will continue bowel regimen for opiate-induced constipation.  11.  We will continue metformin for type 2 diabetes.  12. We will continue Eliquis 5 mg twice daily.  I encouraged him to be compliant with dosing twice a day.  13.  We will add Ativan 1 mg 3 times a day as needed for anxiety and sleep disturbance.  He was given a new prescription for this today.  14.  I encouraged the patient to wear his supplemental oxygen when he has feelings of shortness of breath.  I did reassure him that his oxygen saturation is 97% today on room air.  15.  Regarding his complaints of shortness of air, I told the patient that this could be secondary to treatment side effect from immune mediated pneumonitis versus symptomatic anemia. Another consideration is PE, however this is unlikely as the patient is on Eliquis.  His oxygen saturation is adequate today.  He is coughing, however his cough has not been significantly more prominent.  We will check labs today and transfuse him if he is more significantly anemic.  I told the patient that treatment his anemia could exacerbate symptoms of shortness  of breath.  Will consider adding steroids in the future if his shortness of breath continues to worsen without any other notable cause.   16.  We will add Megace daily for poor appetite and weight loss.  17.  We will give the patient 1 L of normal saline today secondary to dehydration, nausea, and weakness.    Sasha Callejas, APRN  12/31/2019   8:40 AM

## 2020-01-01 ENCOUNTER — APPOINTMENT (OUTPATIENT)
Dept: CT IMAGING | Facility: HOSPITAL | Age: 70
End: 2020-01-01

## 2020-01-01 ENCOUNTER — READMISSION MANAGEMENT (OUTPATIENT)
Dept: CALL CENTER | Facility: HOSPITAL | Age: 70
End: 2020-01-01

## 2020-01-01 ENCOUNTER — HOSPITAL ENCOUNTER (INPATIENT)
Facility: HOSPITAL | Age: 70
LOS: 1 days | Discharge: HOSPICE | End: 2020-04-15
Attending: EMERGENCY MEDICINE | Admitting: INTERNAL MEDICINE

## 2020-01-01 ENCOUNTER — HOSPITAL ENCOUNTER (OUTPATIENT)
Dept: GENERAL RADIOLOGY | Facility: HOSPITAL | Age: 70
Discharge: HOME | End: 2020-03-18
Admitting: NURSE PRACTITIONER

## 2020-01-01 ENCOUNTER — APPOINTMENT (OUTPATIENT)
Dept: GENERAL RADIOLOGY | Facility: HOSPITAL | Age: 70
End: 2020-01-01

## 2020-01-01 ENCOUNTER — OFFICE VISIT (OUTPATIENT)
Dept: CARDIAC SURGERY | Facility: CLINIC | Age: 70
End: 2020-01-01

## 2020-01-01 ENCOUNTER — OFFICE VISIT (OUTPATIENT)
Dept: ONCOLOGY | Facility: CLINIC | Age: 70
End: 2020-01-01

## 2020-01-01 ENCOUNTER — OFFICE VISIT (OUTPATIENT)
Dept: INTERNAL MEDICINE | Facility: CLINIC | Age: 70
End: 2020-01-01

## 2020-01-01 ENCOUNTER — TELEPHONE (OUTPATIENT)
Dept: ONCOLOGY | Facility: CLINIC | Age: 70
End: 2020-01-01

## 2020-01-01 ENCOUNTER — TRANSITIONAL CARE MANAGEMENT TELEPHONE ENCOUNTER (OUTPATIENT)
Dept: CALL CENTER | Facility: HOSPITAL | Age: 70
End: 2020-01-01

## 2020-01-01 ENCOUNTER — APPOINTMENT (OUTPATIENT)
Dept: INTERVENTIONAL RADIOLOGY/VASCULAR | Facility: HOSPITAL | Age: 70
End: 2020-01-01

## 2020-01-01 ENCOUNTER — TELEPHONE (OUTPATIENT)
Dept: INTERNAL MEDICINE | Facility: CLINIC | Age: 70
End: 2020-01-01

## 2020-01-01 ENCOUNTER — HOSPITAL ENCOUNTER (OUTPATIENT)
Dept: ONCOLOGY | Facility: HOSPITAL | Age: 70
Setting detail: INFUSION SERIES
Discharge: HOME | End: 2020-03-18

## 2020-01-01 ENCOUNTER — HOSPITAL ENCOUNTER (OUTPATIENT)
Dept: ONCOLOGY | Facility: HOSPITAL | Age: 70
Setting detail: INFUSION SERIES
Discharge: HOME | End: 2020-03-02

## 2020-01-01 ENCOUNTER — CALL CENTER PROGRAMS (OUTPATIENT)
Dept: CALL CENTER | Facility: HOSPITAL | Age: 70
End: 2020-01-01

## 2020-01-01 ENCOUNTER — HOSPITAL ENCOUNTER (OUTPATIENT)
Dept: ONCOLOGY | Facility: HOSPITAL | Age: 70
Setting detail: INFUSION SERIES
Discharge: HOME | End: 2020-01-21

## 2020-01-01 ENCOUNTER — APPOINTMENT (OUTPATIENT)
Dept: ONCOLOGY | Facility: HOSPITAL | Age: 70
End: 2020-01-01

## 2020-01-01 ENCOUNTER — HOSPITAL ENCOUNTER (INPATIENT)
Facility: HOSPITAL | Age: 70
LOS: 2 days | Discharge: HOME | End: 2020-02-26
Attending: EMERGENCY MEDICINE | Admitting: HOSPITALIST

## 2020-01-01 ENCOUNTER — EPISODE CHANGES (OUTPATIENT)
Dept: CASE MANAGEMENT | Facility: OTHER | Age: 70
End: 2020-01-01

## 2020-01-01 ENCOUNTER — APPOINTMENT (OUTPATIENT)
Dept: ULTRASOUND IMAGING | Facility: HOSPITAL | Age: 70
End: 2020-01-01

## 2020-01-01 ENCOUNTER — HOSPITAL ENCOUNTER (OUTPATIENT)
Dept: ONCOLOGY | Facility: HOSPITAL | Age: 70
Setting detail: INFUSION SERIES
Discharge: HOME | End: 2020-04-10

## 2020-01-01 ENCOUNTER — HOSPITAL ENCOUNTER (OUTPATIENT)
Dept: ONCOLOGY | Facility: HOSPITAL | Age: 70
Setting detail: INFUSION SERIES
Discharge: HOME | End: 2020-03-10

## 2020-01-01 ENCOUNTER — HOSPITAL ENCOUNTER (OUTPATIENT)
Dept: ONCOLOGY | Facility: HOSPITAL | Age: 70
Discharge: HOME | End: 2020-03-02

## 2020-01-01 ENCOUNTER — EDUCATION (OUTPATIENT)
Dept: ONCOLOGY | Facility: HOSPITAL | Age: 70
End: 2020-01-01

## 2020-01-01 ENCOUNTER — HOSPITAL ENCOUNTER (OUTPATIENT)
Dept: ONCOLOGY | Facility: HOSPITAL | Age: 70
Setting detail: INFUSION SERIES
Discharge: HOME | End: 2020-02-14

## 2020-01-01 ENCOUNTER — DOCUMENTATION (OUTPATIENT)
Dept: NUTRITION | Facility: HOSPITAL | Age: 70
End: 2020-01-01

## 2020-01-01 ENCOUNTER — HOSPITAL ENCOUNTER (OUTPATIENT)
Dept: ONCOLOGY | Facility: HOSPITAL | Age: 70
Setting detail: INFUSION SERIES
Discharge: HOME | End: 2020-04-03

## 2020-01-01 ENCOUNTER — HOSPITAL ENCOUNTER (OUTPATIENT)
Dept: ONCOLOGY | Facility: HOSPITAL | Age: 70
Setting detail: INFUSION SERIES
Discharge: HOME | End: 2020-01-02

## 2020-01-01 VITALS
DIASTOLIC BLOOD PRESSURE: 60 MMHG | HEIGHT: 70 IN | RESPIRATION RATE: 18 BRPM | WEIGHT: 151 LBS | SYSTOLIC BLOOD PRESSURE: 115 MMHG | TEMPERATURE: 98.7 F | HEART RATE: 100 BPM | BODY MASS INDEX: 21.62 KG/M2

## 2020-01-01 VITALS
TEMPERATURE: 97.5 F | SYSTOLIC BLOOD PRESSURE: 144 MMHG | HEIGHT: 71 IN | DIASTOLIC BLOOD PRESSURE: 74 MMHG | WEIGHT: 157 LBS | RESPIRATION RATE: 18 BRPM | DIASTOLIC BLOOD PRESSURE: 90 MMHG | HEART RATE: 98 BPM | HEART RATE: 78 BPM | BODY MASS INDEX: 21.98 KG/M2 | SYSTOLIC BLOOD PRESSURE: 157 MMHG

## 2020-01-01 VITALS
WEIGHT: 149 LBS | DIASTOLIC BLOOD PRESSURE: 70 MMHG | HEIGHT: 71 IN | BODY MASS INDEX: 20.86 KG/M2 | SYSTOLIC BLOOD PRESSURE: 126 MMHG | HEART RATE: 102 BPM | TEMPERATURE: 98.1 F | RESPIRATION RATE: 18 BRPM

## 2020-01-01 VITALS
SYSTOLIC BLOOD PRESSURE: 120 MMHG | BODY MASS INDEX: 22.96 KG/M2 | HEIGHT: 71 IN | DIASTOLIC BLOOD PRESSURE: 71 MMHG | RESPIRATION RATE: 20 BRPM | HEART RATE: 114 BPM | TEMPERATURE: 98.6 F | OXYGEN SATURATION: 93 % | WEIGHT: 164 LBS

## 2020-01-01 VITALS
TEMPERATURE: 98.8 F | HEIGHT: 71 IN | RESPIRATION RATE: 14 BRPM | WEIGHT: 149 LBS | HEART RATE: 55 BPM | SYSTOLIC BLOOD PRESSURE: 138 MMHG | OXYGEN SATURATION: 99 % | BODY MASS INDEX: 20.86 KG/M2 | DIASTOLIC BLOOD PRESSURE: 86 MMHG

## 2020-01-01 VITALS
DIASTOLIC BLOOD PRESSURE: 76 MMHG | TEMPERATURE: 97.2 F | SYSTOLIC BLOOD PRESSURE: 116 MMHG | HEART RATE: 122 BPM | RESPIRATION RATE: 18 BRPM | OXYGEN SATURATION: 95 % | HEIGHT: 71 IN | WEIGHT: 149 LBS | BODY MASS INDEX: 20.86 KG/M2

## 2020-01-01 VITALS
OXYGEN SATURATION: 99 % | BODY MASS INDEX: 21.08 KG/M2 | SYSTOLIC BLOOD PRESSURE: 130 MMHG | WEIGHT: 150.6 LBS | DIASTOLIC BLOOD PRESSURE: 70 MMHG | HEART RATE: 114 BPM | TEMPERATURE: 97.9 F | HEIGHT: 71 IN

## 2020-01-01 VITALS
RESPIRATION RATE: 16 BRPM | HEART RATE: 61 BPM | WEIGHT: 152 LBS | DIASTOLIC BLOOD PRESSURE: 91 MMHG | SYSTOLIC BLOOD PRESSURE: 142 MMHG | OXYGEN SATURATION: 98 % | TEMPERATURE: 97.7 F | HEIGHT: 71 IN | BODY MASS INDEX: 21.28 KG/M2

## 2020-01-01 VITALS
DIASTOLIC BLOOD PRESSURE: 95 MMHG | WEIGHT: 157 LBS | SYSTOLIC BLOOD PRESSURE: 153 MMHG | BODY MASS INDEX: 21.98 KG/M2 | HEART RATE: 89 BPM | RESPIRATION RATE: 20 BRPM | HEIGHT: 71 IN | TEMPERATURE: 96.7 F

## 2020-01-01 VITALS
DIASTOLIC BLOOD PRESSURE: 86 MMHG | HEIGHT: 71 IN | SYSTOLIC BLOOD PRESSURE: 136 MMHG | RESPIRATION RATE: 20 BRPM | HEART RATE: 96 BPM | OXYGEN SATURATION: 92 % | TEMPERATURE: 98.3 F | BODY MASS INDEX: 22.68 KG/M2 | WEIGHT: 162 LBS

## 2020-01-01 VITALS
RESPIRATION RATE: 18 BRPM | HEIGHT: 71 IN | SYSTOLIC BLOOD PRESSURE: 110 MMHG | HEART RATE: 104 BPM | DIASTOLIC BLOOD PRESSURE: 62 MMHG | WEIGHT: 154.76 LBS | OXYGEN SATURATION: 96 % | TEMPERATURE: 98 F | BODY MASS INDEX: 21.67 KG/M2

## 2020-01-01 VITALS
WEIGHT: 157 LBS | BODY MASS INDEX: 21.98 KG/M2 | SYSTOLIC BLOOD PRESSURE: 110 MMHG | HEIGHT: 71 IN | HEART RATE: 80 BPM | DIASTOLIC BLOOD PRESSURE: 60 MMHG

## 2020-01-01 VITALS — HEART RATE: 94 BPM | SYSTOLIC BLOOD PRESSURE: 141 MMHG | DIASTOLIC BLOOD PRESSURE: 79 MMHG

## 2020-01-01 DIAGNOSIS — G89.4 CHRONIC PAIN SYNDROME: ICD-10-CM

## 2020-01-01 DIAGNOSIS — C34.11 SMALL CELL LUNG CANCER, RIGHT UPPER LOBE (HCC): Primary | ICD-10-CM

## 2020-01-01 DIAGNOSIS — D84.821 IMMUNOSUPPRESSED DUE TO CHEMOTHERAPY (HCC): ICD-10-CM

## 2020-01-01 DIAGNOSIS — C34.11 SMALL CELL LUNG CANCER, RIGHT UPPER LOBE (HCC): ICD-10-CM

## 2020-01-01 DIAGNOSIS — Z45.2 ENCOUNTER FOR CARE RELATED TO VASCULAR ACCESS PORT: ICD-10-CM

## 2020-01-01 DIAGNOSIS — G89.29 OTHER CHRONIC PAIN: ICD-10-CM

## 2020-01-01 DIAGNOSIS — R06.00 DYSPNEA, UNSPECIFIED TYPE: Primary | ICD-10-CM

## 2020-01-01 DIAGNOSIS — Z79.899 IMMUNOSUPPRESSED DUE TO CHEMOTHERAPY (HCC): ICD-10-CM

## 2020-01-01 DIAGNOSIS — R07.9 CHEST PAIN, UNSPECIFIED TYPE: ICD-10-CM

## 2020-01-01 DIAGNOSIS — Z12.11 SCREEN FOR COLON CANCER: ICD-10-CM

## 2020-01-01 DIAGNOSIS — C34.11 MALIGNANT NEOPLASM OF UPPER LOBE OF RIGHT LUNG (HCC): ICD-10-CM

## 2020-01-01 DIAGNOSIS — R10.10 PAIN OF UPPER ABDOMEN: ICD-10-CM

## 2020-01-01 DIAGNOSIS — G89.29 CHRONIC NECK AND BACK PAIN: ICD-10-CM

## 2020-01-01 DIAGNOSIS — M54.2 CHRONIC NECK AND BACK PAIN: ICD-10-CM

## 2020-01-01 DIAGNOSIS — I63.512 LEFT MIDDLE CEREBRAL ARTERY STROKE (HCC): ICD-10-CM

## 2020-01-01 DIAGNOSIS — R09.02 HYPOXIA: ICD-10-CM

## 2020-01-01 DIAGNOSIS — J40 BRONCHITIS: ICD-10-CM

## 2020-01-01 DIAGNOSIS — K21.00 GASTROESOPHAGEAL REFLUX DISEASE WITH ESOPHAGITIS: ICD-10-CM

## 2020-01-01 DIAGNOSIS — Z45.2 ENCOUNTER FOR CARE RELATED TO VASCULAR ACCESS PORT: Primary | ICD-10-CM

## 2020-01-01 DIAGNOSIS — J18.1 CONSOLIDATION OF RIGHT LOWER LOBE OF LUNG (HCC): ICD-10-CM

## 2020-01-01 DIAGNOSIS — J90 PLEURAL EFFUSION: ICD-10-CM

## 2020-01-01 DIAGNOSIS — J90 PLEURAL EFFUSION, BILATERAL: ICD-10-CM

## 2020-01-01 DIAGNOSIS — A41.9 SEPSIS, DUE TO UNSPECIFIED ORGANISM, UNSPECIFIED WHETHER ACUTE ORGAN DYSFUNCTION PRESENT (HCC): Primary | ICD-10-CM

## 2020-01-01 DIAGNOSIS — M54.9 CHRONIC NECK AND BACK PAIN: ICD-10-CM

## 2020-01-01 DIAGNOSIS — E11.65 UNCONTROLLED TYPE 2 DIABETES MELLITUS WITH HYPERGLYCEMIA (HCC): ICD-10-CM

## 2020-01-01 DIAGNOSIS — F32.A DEPRESSION, UNSPECIFIED DEPRESSION TYPE: ICD-10-CM

## 2020-01-01 DIAGNOSIS — T45.1X5A IMMUNOSUPPRESSED DUE TO CHEMOTHERAPY (HCC): ICD-10-CM

## 2020-01-01 DIAGNOSIS — J91.0 MALIGNANT PLEURAL EFFUSION: ICD-10-CM

## 2020-01-01 DIAGNOSIS — C79.9 METASTATIC CANCER (HCC): ICD-10-CM

## 2020-01-01 DIAGNOSIS — E78.49 OTHER HYPERLIPIDEMIA: Primary | ICD-10-CM

## 2020-01-01 LAB
ABO + RH BLD: NORMAL
ABO + RH BLD: NORMAL
ALBUMIN SERPL-MCNC: 2.9 G/DL (ref 3.5–5.2)
ALBUMIN SERPL-MCNC: 3 G/DL (ref 3.5–5.2)
ALBUMIN SERPL-MCNC: 3.1 G/DL (ref 3.5–5.2)
ALBUMIN SERPL-MCNC: 3.3 G/DL (ref 3.5–5.2)
ALBUMIN SERPL-MCNC: 3.6 G/DL (ref 3.5–5.2)
ALBUMIN SERPL-MCNC: 3.7 G/DL (ref 3.5–5.2)
ALBUMIN SERPL-MCNC: 3.8 G/DL (ref 3.5–5.2)
ALBUMIN/GLOB SERPL: 0.8 G/DL
ALBUMIN/GLOB SERPL: 0.9 G/DL
ALBUMIN/GLOB SERPL: 1 G/DL
ALBUMIN/GLOB SERPL: 1.1 G/DL
ALP SERPL-CCNC: 101 U/L (ref 39–117)
ALP SERPL-CCNC: 118 U/L (ref 39–117)
ALP SERPL-CCNC: 150 U/L (ref 39–117)
ALP SERPL-CCNC: 154 U/L (ref 39–117)
ALP SERPL-CCNC: 237 U/L (ref 39–117)
ALP SERPL-CCNC: 409 U/L (ref 39–117)
ALP SERPL-CCNC: 441 U/L (ref 39–117)
ALT SERPL W P-5'-P-CCNC: 13 U/L (ref 1–41)
ALT SERPL W P-5'-P-CCNC: 37 U/L (ref 1–41)
ALT SERPL W P-5'-P-CCNC: 37 U/L (ref 1–41)
ALT SERPL W P-5'-P-CCNC: 46 U/L (ref 1–41)
ALT SERPL W P-5'-P-CCNC: 48 U/L (ref 1–41)
ALT SERPL W P-5'-P-CCNC: 53 U/L (ref 1–41)
ALT SERPL W P-5'-P-CCNC: 92 U/L (ref 1–41)
AMMONIA BLD-SCNC: 24 UMOL/L (ref 16–60)
ANION GAP SERPL CALCULATED.3IONS-SCNC: 10 MMOL/L (ref 5–15)
ANION GAP SERPL CALCULATED.3IONS-SCNC: 11 MMOL/L (ref 5–15)
ANION GAP SERPL CALCULATED.3IONS-SCNC: 13 MMOL/L (ref 5–15)
ANION GAP SERPL CALCULATED.3IONS-SCNC: 13 MMOL/L (ref 5–15)
ANION GAP SERPL CALCULATED.3IONS-SCNC: 14 MMOL/L (ref 5–15)
ARTERIAL PATENCY WRIST A: ABNORMAL
AST SERPL-CCNC: 109 U/L (ref 1–40)
AST SERPL-CCNC: 17 U/L (ref 1–40)
AST SERPL-CCNC: 27 U/L (ref 1–40)
AST SERPL-CCNC: 44 U/L (ref 1–40)
AST SERPL-CCNC: 45 U/L (ref 1–40)
AST SERPL-CCNC: 47 U/L (ref 1–40)
AST SERPL-CCNC: 75 U/L (ref 1–40)
ATMOSPHERIC PRESS: ABNORMAL MM[HG]
B PARAPERT DNA SPEC QL NAA+PROBE: NOT DETECTED
B PARAPERT DNA SPEC QL NAA+PROBE: NOT DETECTED
B PERT DNA SPEC QL NAA+PROBE: NOT DETECTED
B PERT DNA SPEC QL NAA+PROBE: NOT DETECTED
BACTERIA SPEC AEROBE CULT: NO GROWTH
BACTERIA SPEC AEROBE CULT: NORMAL
BACTERIA UR QL AUTO: NORMAL /HPF
BASE EXCESS BLDA CALC-SCNC: 2.8 MMOL/L (ref 0–2)
BASOPHILS # BLD AUTO: 0.01 10*3/MM3 (ref 0–0.2)
BASOPHILS # BLD AUTO: 0.02 10*3/MM3 (ref 0–0.2)
BASOPHILS NFR BLD AUTO: 0.2 % (ref 0–1.5)
BASOPHILS NFR BLD AUTO: 0.3 % (ref 0–1.5)
BDY SITE: ABNORMAL
BH BB BLOOD EXPIRATION DATE: NORMAL
BH BB BLOOD EXPIRATION DATE: NORMAL
BH BB BLOOD TYPE BARCODE: 5100
BH BB BLOOD TYPE BARCODE: 5100
BH BB DISPENSE STATUS: NORMAL
BH BB DISPENSE STATUS: NORMAL
BH BB PRODUCT CODE: NORMAL
BH BB PRODUCT CODE: NORMAL
BH BB UNIT NUMBER: NORMAL
BH BB UNIT NUMBER: NORMAL
BILIRUB SERPL-MCNC: 0.3 MG/DL (ref 0.2–1.2)
BILIRUB SERPL-MCNC: 0.3 MG/DL (ref 0.2–1.2)
BILIRUB SERPL-MCNC: 0.4 MG/DL (ref 0.2–1.2)
BILIRUB SERPL-MCNC: 0.6 MG/DL (ref 0.2–1.2)
BILIRUB SERPL-MCNC: 0.6 MG/DL (ref 0.2–1.2)
BILIRUB UR QL STRIP: NEGATIVE
BILIRUB UR QL STRIP: NEGATIVE
BODY TEMPERATURE: 37 C
BUN BLD-MCNC: 11 MG/DL (ref 8–23)
BUN BLD-MCNC: 13 MG/DL (ref 8–23)
BUN BLD-MCNC: 15 MG/DL (ref 8–23)
BUN BLD-MCNC: 17 MG/DL (ref 8–23)
BUN BLD-MCNC: 18 MG/DL (ref 8–23)
BUN BLD-MCNC: 19 MG/DL (ref 8–23)
BUN BLD-MCNC: 25 MG/DL (ref 8–23)
BUN/CREAT SERPL: 16.4 (ref 7–25)
BUN/CREAT SERPL: 18.6 (ref 7–25)
BUN/CREAT SERPL: 19.5 (ref 7–25)
BUN/CREAT SERPL: 22.9 (ref 7–25)
BUN/CREAT SERPL: 23.6 (ref 7–25)
BUN/CREAT SERPL: 23.8 (ref 7–25)
BUN/CREAT SERPL: 25.7 (ref 7–25)
BUN/CREAT SERPL: 27.3 (ref 7–25)
BUN/CREAT SERPL: 34.2 (ref 7–25)
C PNEUM DNA NPH QL NAA+NON-PROBE: NOT DETECTED
C PNEUM DNA NPH QL NAA+NON-PROBE: NOT DETECTED
CALCIUM SPEC-SCNC: 8.1 MG/DL (ref 8.6–10.5)
CALCIUM SPEC-SCNC: 8.5 MG/DL (ref 8.6–10.5)
CALCIUM SPEC-SCNC: 8.6 MG/DL (ref 8.6–10.5)
CALCIUM SPEC-SCNC: 8.8 MG/DL (ref 8.6–10.5)
CALCIUM SPEC-SCNC: 8.9 MG/DL (ref 8.6–10.5)
CALCIUM SPEC-SCNC: 9 MG/DL (ref 8.6–10.5)
CALCIUM SPEC-SCNC: 9.1 MG/DL (ref 8.6–10.5)
CALCIUM SPEC-SCNC: 9.2 MG/DL (ref 8.6–10.5)
CALCIUM SPEC-SCNC: 9.9 MG/DL (ref 8.6–10.5)
CHLORIDE SERPL-SCNC: 100 MMOL/L (ref 98–107)
CHLORIDE SERPL-SCNC: 100 MMOL/L (ref 98–107)
CHLORIDE SERPL-SCNC: 102 MMOL/L (ref 98–107)
CHLORIDE SERPL-SCNC: 103 MMOL/L (ref 98–107)
CHLORIDE SERPL-SCNC: 93 MMOL/L (ref 98–107)
CHLORIDE SERPL-SCNC: 94 MMOL/L (ref 98–107)
CHLORIDE SERPL-SCNC: 95 MMOL/L (ref 98–107)
CHLORIDE SERPL-SCNC: 98 MMOL/L (ref 98–107)
CHLORIDE SERPL-SCNC: 98 MMOL/L (ref 98–107)
CLARITY UR: CLEAR
CLARITY UR: CLEAR
CO2 BLDA-SCNC: 28.9 MMOL/L (ref 22–33)
CO2 SERPL-SCNC: 20 MMOL/L (ref 22–29)
CO2 SERPL-SCNC: 23 MMOL/L (ref 22–29)
CO2 SERPL-SCNC: 23 MMOL/L (ref 22–29)
CO2 SERPL-SCNC: 24 MMOL/L (ref 22–29)
CO2 SERPL-SCNC: 24 MMOL/L (ref 22–29)
CO2 SERPL-SCNC: 25 MMOL/L (ref 22–29)
CO2 SERPL-SCNC: 26 MMOL/L (ref 22–29)
CO2 SERPL-SCNC: 27 MMOL/L (ref 22–29)
CO2 SERPL-SCNC: 27 MMOL/L (ref 22–29)
COHGB MFR BLD: 2.8 % (ref 0–2)
COLOR UR: YELLOW
COLOR UR: YELLOW
CREAT BLD-MCNC: 0.66 MG/DL (ref 0.76–1.27)
CREAT BLD-MCNC: 0.67 MG/DL (ref 0.76–1.27)
CREAT BLD-MCNC: 0.7 MG/DL (ref 0.76–1.27)
CREAT BLD-MCNC: 0.72 MG/DL (ref 0.76–1.27)
CREAT BLD-MCNC: 0.73 MG/DL (ref 0.76–1.27)
CREAT BLD-MCNC: 0.74 MG/DL (ref 0.76–1.27)
CREAT BLD-MCNC: 0.77 MG/DL (ref 0.76–1.27)
CREAT BLD-MCNC: 0.8 MG/DL (ref 0.76–1.27)
CREAT BLD-MCNC: 0.83 MG/DL (ref 0.76–1.27)
CRP SERPL-MCNC: 5.82 MG/DL (ref 0–0.5)
D-LACTATE SERPL-SCNC: 1.8 MMOL/L (ref 0.5–2)
D-LACTATE SERPL-SCNC: 2 MMOL/L (ref 0.5–2)
D-LACTATE SERPL-SCNC: 2.9 MMOL/L (ref 0.5–2)
DEPRECATED RDW RBC AUTO: 62.2 FL (ref 37–54)
DEPRECATED RDW RBC AUTO: 63 FL (ref 37–54)
DEPRECATED RDW RBC AUTO: 64 FL (ref 37–54)
DEPRECATED RDW RBC AUTO: 64.6 FL (ref 37–54)
DEPRECATED RDW RBC AUTO: 64.6 FL (ref 37–54)
EOSINOPHIL # BLD AUTO: 0.04 10*3/MM3 (ref 0–0.4)
EOSINOPHIL # BLD AUTO: 0.05 10*3/MM3 (ref 0–0.4)
EOSINOPHIL # BLD AUTO: 0.09 10*3/MM3 (ref 0–0.4)
EOSINOPHIL # BLD AUTO: 0.11 10*3/MM3 (ref 0–0.4)
EOSINOPHIL NFR BLD AUTO: 0.6 % (ref 0.3–6.2)
EOSINOPHIL NFR BLD AUTO: 0.9 % (ref 0.3–6.2)
EOSINOPHIL NFR BLD AUTO: 1.3 % (ref 0.3–6.2)
EOSINOPHIL NFR BLD AUTO: 1.4 % (ref 0.3–6.2)
EPAP: 0
ERYTHROCYTE [DISTWIDTH] IN BLOOD BY AUTOMATED COUNT: 16.9 % (ref 12.3–15.4)
ERYTHROCYTE [DISTWIDTH] IN BLOOD BY AUTOMATED COUNT: 17.2 % (ref 12.3–15.4)
ERYTHROCYTE [DISTWIDTH] IN BLOOD BY AUTOMATED COUNT: 17.2 % (ref 12.3–15.4)
ERYTHROCYTE [DISTWIDTH] IN BLOOD BY AUTOMATED COUNT: 17.6 % (ref 12.3–15.4)
ERYTHROCYTE [DISTWIDTH] IN BLOOD BY AUTOMATED COUNT: 18.1 % (ref 12.3–15.4)
ERYTHROCYTE [DISTWIDTH] IN BLOOD BY AUTOMATED COUNT: 18.2 % (ref 12.3–15.4)
ERYTHROCYTE [DISTWIDTH] IN BLOOD BY AUTOMATED COUNT: 18.2 % (ref 12.3–15.4)
ERYTHROCYTE [DISTWIDTH] IN BLOOD BY AUTOMATED COUNT: 19 % (ref 12.3–15.4)
ERYTHROCYTE [DISTWIDTH] IN BLOOD BY AUTOMATED COUNT: 19 % (ref 12.3–15.4)
ERYTHROCYTE [DISTWIDTH] IN BLOOD BY AUTOMATED COUNT: 19.1 % (ref 12.3–15.4)
ERYTHROCYTE [DISTWIDTH] IN BLOOD BY AUTOMATED COUNT: 19.9 % (ref 12.3–15.4)
ERYTHROCYTE [DISTWIDTH] IN BLOOD BY AUTOMATED COUNT: 20 % (ref 12.3–15.4)
FLUAV H1 2009 PAND RNA NPH QL NAA+PROBE: NOT DETECTED
FLUAV H1 2009 PAND RNA NPH QL NAA+PROBE: NOT DETECTED
FLUAV H1 HA GENE NPH QL NAA+PROBE: NOT DETECTED
FLUAV H1 HA GENE NPH QL NAA+PROBE: NOT DETECTED
FLUAV H3 RNA NPH QL NAA+PROBE: NOT DETECTED
FLUAV H3 RNA NPH QL NAA+PROBE: NOT DETECTED
FLUAV SUBTYP SPEC NAA+PROBE: NOT DETECTED
FLUBV RNA ISLT QL NAA+PROBE: NOT DETECTED
GFR SERPL CREATININE-BSD FRML MDRD: 100 ML/MIN/1.73
GFR SERPL CREATININE-BSD FRML MDRD: 105 ML/MIN/1.73
GFR SERPL CREATININE-BSD FRML MDRD: 107 ML/MIN/1.73
GFR SERPL CREATININE-BSD FRML MDRD: 109 ML/MIN/1.73
GFR SERPL CREATININE-BSD FRML MDRD: 112 ML/MIN/1.73
GFR SERPL CREATININE-BSD FRML MDRD: 118 ML/MIN/1.73
GFR SERPL CREATININE-BSD FRML MDRD: 120 ML/MIN/1.73
GFR SERPL CREATININE-BSD FRML MDRD: 92 ML/MIN/1.73
GFR SERPL CREATININE-BSD FRML MDRD: 96 ML/MIN/1.73
GLOBULIN UR ELPH-MCNC: 3.2 GM/DL
GLOBULIN UR ELPH-MCNC: 3.4 GM/DL
GLOBULIN UR ELPH-MCNC: 3.6 GM/DL
GLOBULIN UR ELPH-MCNC: 3.9 GM/DL
GLOBULIN UR ELPH-MCNC: 3.9 GM/DL
GLOBULIN UR ELPH-MCNC: 4 GM/DL
GLOBULIN UR ELPH-MCNC: 4.5 GM/DL
GLUCOSE BLD-MCNC: 164 MG/DL (ref 65–99)
GLUCOSE BLD-MCNC: 208 MG/DL (ref 65–99)
GLUCOSE BLD-MCNC: 248 MG/DL (ref 65–99)
GLUCOSE BLD-MCNC: 254 MG/DL (ref 65–99)
GLUCOSE BLD-MCNC: 264 MG/DL (ref 65–99)
GLUCOSE BLD-MCNC: 302 MG/DL (ref 65–99)
GLUCOSE BLD-MCNC: 305 MG/DL (ref 65–99)
GLUCOSE BLD-MCNC: 401 MG/DL (ref 65–99)
GLUCOSE BLD-MCNC: 469 MG/DL (ref 65–99)
GLUCOSE BLDC GLUCOMTR-MCNC: 120 MG/DL (ref 70–130)
GLUCOSE BLDC GLUCOMTR-MCNC: 134 MG/DL (ref 70–130)
GLUCOSE BLDC GLUCOMTR-MCNC: 143 MG/DL (ref 70–130)
GLUCOSE BLDC GLUCOMTR-MCNC: 151 MG/DL (ref 70–130)
GLUCOSE BLDC GLUCOMTR-MCNC: 175 MG/DL (ref 70–130)
GLUCOSE BLDC GLUCOMTR-MCNC: 220 MG/DL (ref 70–130)
GLUCOSE BLDC GLUCOMTR-MCNC: 224 MG/DL (ref 70–130)
GLUCOSE BLDC GLUCOMTR-MCNC: 238 MG/DL (ref 70–130)
GLUCOSE BLDC GLUCOMTR-MCNC: 273 MG/DL (ref 70–130)
GLUCOSE BLDC GLUCOMTR-MCNC: 286 MG/DL (ref 70–130)
GLUCOSE BLDC GLUCOMTR-MCNC: 302 MG/DL (ref 70–130)
GLUCOSE BLDC GLUCOMTR-MCNC: 329 MG/DL (ref 70–130)
GLUCOSE BLDC GLUCOMTR-MCNC: 333 MG/DL (ref 70–130)
GLUCOSE BLDC GLUCOMTR-MCNC: 338 MG/DL (ref 70–130)
GLUCOSE UR STRIP-MCNC: ABNORMAL MG/DL
GLUCOSE UR STRIP-MCNC: ABNORMAL MG/DL
HADV DNA SPEC NAA+PROBE: NOT DETECTED
HADV DNA SPEC NAA+PROBE: NOT DETECTED
HBA1C MFR BLD: 9.4 % (ref 4.8–5.6)
HCO3 BLDA-SCNC: 27.6 MMOL/L (ref 20–26)
HCOV 229E RNA SPEC QL NAA+PROBE: NOT DETECTED
HCOV 229E RNA SPEC QL NAA+PROBE: NOT DETECTED
HCOV HKU1 RNA SPEC QL NAA+PROBE: NOT DETECTED
HCOV HKU1 RNA SPEC QL NAA+PROBE: NOT DETECTED
HCOV NL63 RNA SPEC QL NAA+PROBE: NOT DETECTED
HCOV NL63 RNA SPEC QL NAA+PROBE: NOT DETECTED
HCOV OC43 RNA SPEC QL NAA+PROBE: NOT DETECTED
HCOV OC43 RNA SPEC QL NAA+PROBE: NOT DETECTED
HCT VFR BLD AUTO: 26 % (ref 37.5–51)
HCT VFR BLD AUTO: 28.1 % (ref 37.5–51)
HCT VFR BLD AUTO: 28.2 % (ref 37.5–51)
HCT VFR BLD AUTO: 31.1 % (ref 37.5–51)
HCT VFR BLD AUTO: 31.7 % (ref 37.5–51)
HCT VFR BLD AUTO: 32.4 % (ref 37.5–51)
HCT VFR BLD AUTO: 33.5 % (ref 37.5–51)
HCT VFR BLD AUTO: 34 % (ref 37.5–51)
HCT VFR BLD AUTO: 34.5 % (ref 37.5–51)
HCT VFR BLD AUTO: 34.6 % (ref 37.5–51)
HCT VFR BLD AUTO: 35 % (ref 37.5–51)
HCT VFR BLD AUTO: 37.7 % (ref 37.5–51)
HCT VFR BLD CALC: 25.6 %
HGB BLD-MCNC: 10.3 G/DL (ref 13–17.7)
HGB BLD-MCNC: 10.4 G/DL (ref 13–17.7)
HGB BLD-MCNC: 10.6 G/DL (ref 13–17.7)
HGB BLD-MCNC: 10.6 G/DL (ref 13–17.7)
HGB BLD-MCNC: 10.8 G/DL (ref 13–17.7)
HGB BLD-MCNC: 10.9 G/DL (ref 13–17.7)
HGB BLD-MCNC: 11.3 G/DL (ref 13–17.7)
HGB BLD-MCNC: 11.6 G/DL (ref 13–17.7)
HGB BLD-MCNC: 12 G/DL (ref 13–17.7)
HGB BLD-MCNC: 7.9 G/DL (ref 13–17.7)
HGB BLD-MCNC: 8.6 G/DL (ref 13–17.7)
HGB BLD-MCNC: 9.3 G/DL (ref 13–17.7)
HGB BLDA-MCNC: 8.3 G/DL (ref 13.5–17.5)
HGB UR QL STRIP.AUTO: NEGATIVE
HGB UR QL STRIP.AUTO: NEGATIVE
HMPV RNA NPH QL NAA+NON-PROBE: NOT DETECTED
HMPV RNA NPH QL NAA+NON-PROBE: NOT DETECTED
HOLD SPECIMEN: NORMAL
HOROWITZ INDEX BLD+IHG-RTO: 32 %
HPIV1 RNA SPEC QL NAA+PROBE: NOT DETECTED
HPIV1 RNA SPEC QL NAA+PROBE: NOT DETECTED
HPIV2 RNA SPEC QL NAA+PROBE: NOT DETECTED
HPIV2 RNA SPEC QL NAA+PROBE: NOT DETECTED
HPIV3 RNA NPH QL NAA+PROBE: NOT DETECTED
HPIV3 RNA NPH QL NAA+PROBE: NOT DETECTED
HPIV4 P GENE NPH QL NAA+PROBE: NOT DETECTED
HPIV4 P GENE NPH QL NAA+PROBE: NOT DETECTED
HYALINE CASTS UR QL AUTO: NORMAL /LPF
IMM GRANULOCYTES # BLD AUTO: 0.03 10*3/MM3 (ref 0–0.05)
IMM GRANULOCYTES # BLD AUTO: 0.04 10*3/MM3 (ref 0–0.05)
IMM GRANULOCYTES # BLD AUTO: 0.04 10*3/MM3 (ref 0–0.05)
IMM GRANULOCYTES # BLD AUTO: 0.08 10*3/MM3 (ref 0–0.05)
IMM GRANULOCYTES NFR BLD AUTO: 0.5 % (ref 0–0.5)
IMM GRANULOCYTES NFR BLD AUTO: 0.6 % (ref 0–0.5)
IMM GRANULOCYTES NFR BLD AUTO: 0.7 % (ref 0–0.5)
IMM GRANULOCYTES NFR BLD AUTO: 1 % (ref 0–0.5)
INR PPP: 1.33 (ref 0.85–1.16)
IPAP: 0
KETONES UR QL STRIP: ABNORMAL
KETONES UR QL STRIP: NEGATIVE
LACTATE HOLD SPECIMEN: NORMAL
LDH SERPL-CCNC: 1194 U/L (ref 135–225)
LEUKOCYTE ESTERASE UR QL STRIP.AUTO: NEGATIVE
LEUKOCYTE ESTERASE UR QL STRIP.AUTO: NEGATIVE
LIPASE SERPL-CCNC: 15 U/L (ref 13–60)
LYMPHOCYTES # BLD AUTO: 0.41 10*3/MM3 (ref 0.7–3.1)
LYMPHOCYTES # BLD AUTO: 0.52 10*3/MM3 (ref 0.7–3.1)
LYMPHOCYTES # BLD AUTO: 0.8 10*3/MM3 (ref 0.7–3.1)
LYMPHOCYTES # BLD AUTO: 0.87 10*3/MM3 (ref 0.7–3.1)
LYMPHOCYTES # BLD AUTO: 0.9 10*3/MM3 (ref 0.7–3.1)
LYMPHOCYTES # BLD AUTO: 0.99 10*3/MM3 (ref 0.7–3.1)
LYMPHOCYTES # BLD AUTO: 1 10*3/MM3 (ref 0.7–3.1)
LYMPHOCYTES # BLD AUTO: 1.1 10*3/MM3 (ref 0.7–3.1)
LYMPHOCYTES # BLD AUTO: 1.2 10*3/MM3 (ref 0.7–3.1)
LYMPHOCYTES # BLD AUTO: 1.5 10*3/MM3 (ref 0.7–3.1)
LYMPHOCYTES # BLD AUTO: 1.9 10*3/MM3 (ref 0.7–3.1)
LYMPHOCYTES NFR BLD AUTO: 10.1 % (ref 19.6–45.3)
LYMPHOCYTES NFR BLD AUTO: 12.5 % (ref 19.6–45.3)
LYMPHOCYTES NFR BLD AUTO: 12.6 % (ref 19.6–45.3)
LYMPHOCYTES NFR BLD AUTO: 13.1 % (ref 19.6–45.3)
LYMPHOCYTES NFR BLD AUTO: 18.2 % (ref 19.6–45.3)
LYMPHOCYTES NFR BLD AUTO: 18.6 % (ref 19.6–45.3)
LYMPHOCYTES NFR BLD AUTO: 18.9 % (ref 19.6–45.3)
LYMPHOCYTES NFR BLD AUTO: 7.6 % (ref 19.6–45.3)
LYMPHOCYTES NFR BLD AUTO: 7.9 % (ref 19.6–45.3)
LYMPHOCYTES NFR BLD AUTO: 8.7 % (ref 19.6–45.3)
LYMPHOCYTES NFR BLD AUTO: 9.5 % (ref 19.6–45.3)
M PNEUMO IGG SER IA-ACNC: NOT DETECTED
M PNEUMO IGG SER IA-ACNC: NOT DETECTED
MCH RBC QN AUTO: 29.5 PG (ref 26.6–33)
MCH RBC QN AUTO: 30.5 PG (ref 26.6–33)
MCH RBC QN AUTO: 30.7 PG (ref 26.6–33)
MCH RBC QN AUTO: 30.8 PG (ref 26.6–33)
MCH RBC QN AUTO: 30.9 PG (ref 26.6–33)
MCH RBC QN AUTO: 31.3 PG (ref 26.6–33)
MCH RBC QN AUTO: 31.3 PG (ref 26.6–33)
MCH RBC QN AUTO: 31.4 PG (ref 26.6–33)
MCH RBC QN AUTO: 31.4 PG (ref 26.6–33)
MCH RBC QN AUTO: 32 PG (ref 26.6–33)
MCH RBC QN AUTO: 32.1 PG (ref 26.6–33)
MCH RBC QN AUTO: 32.2 PG (ref 26.6–33)
MCHC RBC AUTO-ENTMCNC: 30.4 G/DL (ref 31.5–35.7)
MCHC RBC AUTO-ENTMCNC: 30.5 G/DL (ref 31.5–35.7)
MCHC RBC AUTO-ENTMCNC: 31.3 G/DL (ref 31.5–35.7)
MCHC RBC AUTO-ENTMCNC: 31.6 G/DL (ref 31.5–35.7)
MCHC RBC AUTO-ENTMCNC: 31.8 G/DL (ref 31.5–35.7)
MCHC RBC AUTO-ENTMCNC: 32.2 G/DL (ref 31.5–35.7)
MCHC RBC AUTO-ENTMCNC: 32.3 G/DL (ref 31.5–35.7)
MCHC RBC AUTO-ENTMCNC: 32.7 G/DL (ref 31.5–35.7)
MCHC RBC AUTO-ENTMCNC: 32.9 G/DL (ref 31.5–35.7)
MCHC RBC AUTO-ENTMCNC: 33.2 G/DL (ref 31.5–35.7)
MCHC RBC AUTO-ENTMCNC: 33.2 G/DL (ref 31.5–35.7)
MCHC RBC AUTO-ENTMCNC: 33.5 G/DL (ref 31.5–35.7)
MCV RBC AUTO: 100 FL (ref 79–97)
MCV RBC AUTO: 100.8 FL (ref 79–97)
MCV RBC AUTO: 101.2 FL (ref 79–97)
MCV RBC AUTO: 94 FL (ref 79–97)
MCV RBC AUTO: 94.2 FL (ref 79–97)
MCV RBC AUTO: 94.3 FL (ref 79–97)
MCV RBC AUTO: 94.5 FL (ref 79–97)
MCV RBC AUTO: 94.6 FL (ref 79–97)
MCV RBC AUTO: 96.1 FL (ref 79–97)
MCV RBC AUTO: 97.1 FL (ref 79–97)
MCV RBC AUTO: 99.1 FL (ref 79–97)
MCV RBC AUTO: 99.1 FL (ref 79–97)
METHGB BLD QL: 0.8 % (ref 0–1.5)
MODALITY: ABNORMAL
MONOCYTES # BLD AUTO: 0.1 10*3/MM3 (ref 0.1–0.9)
MONOCYTES # BLD AUTO: 0.11 10*3/MM3 (ref 0.1–0.9)
MONOCYTES # BLD AUTO: 0.13 10*3/MM3 (ref 0.1–0.9)
MONOCYTES # BLD AUTO: 0.4 10*3/MM3 (ref 0.1–0.9)
MONOCYTES # BLD AUTO: 0.5 10*3/MM3 (ref 0.1–0.9)
MONOCYTES # BLD AUTO: 0.6 10*3/MM3 (ref 0.1–0.9)
MONOCYTES # BLD AUTO: 0.6 10*3/MM3 (ref 0.1–0.9)
MONOCYTES # BLD AUTO: 0.71 10*3/MM3 (ref 0.1–0.9)
MONOCYTES # BLD AUTO: 0.72 10*3/MM3 (ref 0.1–0.9)
MONOCYTES NFR BLD AUTO: 1.2 % (ref 5–12)
MONOCYTES NFR BLD AUTO: 10.2 % (ref 5–12)
MONOCYTES NFR BLD AUTO: 2 % (ref 5–12)
MONOCYTES NFR BLD AUTO: 2 % (ref 5–12)
MONOCYTES NFR BLD AUTO: 3.6 % (ref 5–12)
MONOCYTES NFR BLD AUTO: 4.5 % (ref 5–12)
MONOCYTES NFR BLD AUTO: 5.2 % (ref 5–12)
MONOCYTES NFR BLD AUTO: 5.7 % (ref 5–12)
MONOCYTES NFR BLD AUTO: 7.4 % (ref 5–12)
MONOCYTES NFR BLD AUTO: 7.8 % (ref 5–12)
MONOCYTES NFR BLD AUTO: 9.2 % (ref 5–12)
NEUTROPHILS # BLD AUTO: 11.2 10*3/MM3 (ref 1.7–7)
NEUTROPHILS # BLD AUTO: 4.75 10*3/MM3 (ref 1.7–7)
NEUTROPHILS # BLD AUTO: 4.8 10*3/MM3 (ref 1.7–7)
NEUTROPHILS # BLD AUTO: 5.24 10*3/MM3 (ref 1.7–7)
NEUTROPHILS # BLD AUTO: 5.6 10*3/MM3 (ref 1.7–7)
NEUTROPHILS # BLD AUTO: 5.84 10*3/MM3 (ref 1.7–7)
NEUTROPHILS # BLD AUTO: 5.91 10*3/MM3 (ref 1.7–7)
NEUTROPHILS # BLD AUTO: 6.1 10*3/MM3 (ref 1.7–7)
NEUTROPHILS # BLD AUTO: 7.1 10*3/MM3 (ref 1.7–7)
NEUTROPHILS # BLD AUTO: 8 10*3/MM3 (ref 1.7–7)
NEUTROPHILS # BLD AUTO: 8.3 10*3/MM3 (ref 1.7–7)
NEUTROPHILS NFR BLD AUTO: 74.4 % (ref 42.7–76)
NEUTROPHILS NFR BLD AUTO: 75.1 % (ref 42.7–76)
NEUTROPHILS NFR BLD AUTO: 75.5 % (ref 42.7–76)
NEUTROPHILS NFR BLD AUTO: 75.9 % (ref 42.7–76)
NEUTROPHILS NFR BLD AUTO: 77.8 % (ref 42.7–76)
NEUTROPHILS NFR BLD AUTO: 79.1 % (ref 42.7–76)
NEUTROPHILS NFR BLD AUTO: 84.2 % (ref 42.7–76)
NEUTROPHILS NFR BLD AUTO: 86 % (ref 42.7–76)
NEUTROPHILS NFR BLD AUTO: 88.6 % (ref 42.7–76)
NEUTROPHILS NFR BLD AUTO: 88.7 % (ref 42.7–76)
NEUTROPHILS NFR BLD AUTO: 90.1 % (ref 42.7–76)
NITRITE UR QL STRIP: NEGATIVE
NITRITE UR QL STRIP: NEGATIVE
NOTE: ABNORMAL
NRBC BLD AUTO-RTO: 0 /100 WBC (ref 0–0.2)
NRBC BLD AUTO-RTO: 0 /100 WBC (ref 0–0.2)
NRBC BLD AUTO-RTO: 0.3 /100 WBC (ref 0–0.2)
NRBC BLD AUTO-RTO: 0.4 /100 WBC (ref 0–0.2)
NT-PROBNP SERPL-MCNC: 1871 PG/ML (ref 5–900)
NT-PROBNP SERPL-MCNC: 847.6 PG/ML (ref 5–900)
OXYHGB MFR BLDV: 85.9 % (ref 94–99)
PAW @ PEAK INSP FLOW SETTING VENT: 0 CMH2O
PCO2 BLDA: 42.6 MM HG (ref 35–45)
PCO2 TEMP ADJ BLD: 42.6 MM HG (ref 35–48)
PH BLDA: 7.42 PH UNITS (ref 7.35–7.45)
PH UR STRIP.AUTO: <=5 [PH] (ref 5–8)
PH UR STRIP.AUTO: <=5 [PH] (ref 5–8)
PH, TEMP CORRECTED: 7.42 PH UNITS
PLATELET # BLD AUTO: 158 10*3/MM3 (ref 140–450)
PLATELET # BLD AUTO: 162 10*3/MM3 (ref 140–450)
PLATELET # BLD AUTO: 179 10*3/MM3 (ref 140–450)
PLATELET # BLD AUTO: 194 10*3/MM3 (ref 140–450)
PLATELET # BLD AUTO: 203 10*3/MM3 (ref 140–450)
PLATELET # BLD AUTO: 205 10*3/MM3 (ref 140–450)
PLATELET # BLD AUTO: 221 10*3/MM3 (ref 140–450)
PLATELET # BLD AUTO: 237 10*3/MM3 (ref 140–450)
PLATELET # BLD AUTO: 262 10*3/MM3 (ref 140–450)
PLATELET # BLD AUTO: 269 10*3/MM3 (ref 140–450)
PLATELET # BLD AUTO: 298 10*3/MM3 (ref 140–450)
PLATELET # BLD AUTO: 318 10*3/MM3 (ref 140–450)
PMV BLD AUTO: 10.6 FL (ref 6–12)
PMV BLD AUTO: 11 FL (ref 6–12)
PMV BLD AUTO: 11.2 FL (ref 6–12)
PMV BLD AUTO: 11.3 FL (ref 6–12)
PMV BLD AUTO: 11.9 FL (ref 6–12)
PMV BLD AUTO: 7.7 FL (ref 6–12)
PMV BLD AUTO: 8 FL (ref 6–12)
PMV BLD AUTO: 8.1 FL (ref 6–12)
PMV BLD AUTO: 8.1 FL (ref 6–12)
PMV BLD AUTO: 8.2 FL (ref 6–12)
PMV BLD AUTO: 8.3 FL (ref 6–12)
PMV BLD AUTO: 8.8 FL (ref 6–12)
PO2 BLDA: 56 MM HG (ref 83–108)
PO2 TEMP ADJ BLD: 56 MM HG (ref 83–108)
POTASSIUM BLD-SCNC: 3.5 MMOL/L (ref 3.5–5.2)
POTASSIUM BLD-SCNC: 3.6 MMOL/L (ref 3.5–5.2)
POTASSIUM BLD-SCNC: 3.7 MMOL/L (ref 3.5–5.2)
POTASSIUM BLD-SCNC: 3.9 MMOL/L (ref 3.5–5.2)
POTASSIUM BLD-SCNC: 3.9 MMOL/L (ref 3.5–5.2)
POTASSIUM BLD-SCNC: 4 MMOL/L (ref 3.5–5.2)
POTASSIUM BLD-SCNC: 4.3 MMOL/L (ref 3.5–5.2)
POTASSIUM BLD-SCNC: 4.3 MMOL/L (ref 3.5–5.2)
POTASSIUM BLD-SCNC: 4.4 MMOL/L (ref 3.5–5.2)
PROCALCITONIN SERPL-MCNC: 0.28 NG/ML (ref 0.1–0.25)
PROCALCITONIN SERPL-MCNC: 0.68 NG/ML (ref 0.1–0.25)
PROCALCITONIN SERPL-MCNC: 0.69 NG/ML (ref 0.1–0.25)
PROT SERPL-MCNC: 6.3 G/DL (ref 6–8.5)
PROT SERPL-MCNC: 6.5 G/DL (ref 6–8.5)
PROT SERPL-MCNC: 6.9 G/DL (ref 6–8.5)
PROT SERPL-MCNC: 7.2 G/DL (ref 6–8.5)
PROT SERPL-MCNC: 7.2 G/DL (ref 6–8.5)
PROT SERPL-MCNC: 7.7 G/DL (ref 6–8.5)
PROT SERPL-MCNC: 8.1 G/DL (ref 6–8.5)
PROT UR QL STRIP: ABNORMAL
PROT UR QL STRIP: NEGATIVE
PROTHROMBIN TIME: 16.2 SECONDS (ref 11.5–14)
RBC # BLD AUTO: 2.57 10*6/MM3 (ref 4.14–5.8)
RBC # BLD AUTO: 2.82 10*6/MM3 (ref 4.14–5.8)
RBC # BLD AUTO: 2.97 10*6/MM3 (ref 4.14–5.8)
RBC # BLD AUTO: 3.29 10*6/MM3 (ref 4.14–5.8)
RBC # BLD AUTO: 3.37 10*6/MM3 (ref 4.14–5.8)
RBC # BLD AUTO: 3.38 10*6/MM3 (ref 4.14–5.8)
RBC # BLD AUTO: 3.44 10*6/MM3 (ref 4.14–5.8)
RBC # BLD AUTO: 3.48 10*6/MM3 (ref 4.14–5.8)
RBC # BLD AUTO: 3.6 10*6/MM3 (ref 4.14–5.8)
RBC # BLD AUTO: 3.6 10*6/MM3 (ref 4.14–5.8)
RBC # BLD AUTO: 3.61 10*6/MM3 (ref 4.14–5.8)
RBC # BLD AUTO: 3.74 10*6/MM3 (ref 4.14–5.8)
RBC # UR: NORMAL /HPF
REF LAB TEST METHOD: NORMAL
RHINOVIRUS RNA SPEC NAA+PROBE: NOT DETECTED
RHINOVIRUS RNA SPEC NAA+PROBE: NOT DETECTED
RSV RNA NPH QL NAA+NON-PROBE: NOT DETECTED
RSV RNA NPH QL NAA+NON-PROBE: NOT DETECTED
SARS-COV-2 RNA RESP QL NAA+PROBE: NOT DETECTED
SODIUM BLD-SCNC: 132 MMOL/L (ref 136–145)
SODIUM BLD-SCNC: 133 MMOL/L (ref 136–145)
SODIUM BLD-SCNC: 134 MMOL/L (ref 136–145)
SODIUM BLD-SCNC: 135 MMOL/L (ref 136–145)
SODIUM BLD-SCNC: 136 MMOL/L (ref 136–145)
SODIUM BLD-SCNC: 136 MMOL/L (ref 136–145)
SODIUM BLD-SCNC: 137 MMOL/L (ref 136–145)
SODIUM BLD-SCNC: 138 MMOL/L (ref 136–145)
SODIUM BLD-SCNC: 138 MMOL/L (ref 136–145)
SP GR UR STRIP: 1.05 (ref 1–1.03)
SP GR UR STRIP: 1.05 (ref 1–1.03)
SQUAMOUS #/AREA URNS HPF: NORMAL /HPF
T3FREE SERPL-MCNC: 2.45 PG/ML (ref 2–4.4)
T4 FREE SERPL-MCNC: 1.15 NG/DL (ref 0.93–1.7)
TOTAL RATE: 0 BREATHS/MINUTE
TROPONIN T SERPL-MCNC: <0.01 NG/ML (ref 0–0.03)
TSH SERPL DL<=0.05 MIU/L-ACNC: 2.94 UIU/ML (ref 0.27–4.2)
UNIT  ABO: NORMAL
UNIT  ABO: NORMAL
UNIT  RH: NORMAL
UNIT  RH: NORMAL
UROBILINOGEN UR QL STRIP: ABNORMAL
UROBILINOGEN UR QL STRIP: ABNORMAL
WBC NRBC COR # BLD: 10.3 10*3/MM3 (ref 3.4–10.8)
WBC NRBC COR # BLD: 12.4 10*3/MM3 (ref 3.4–10.8)
WBC NRBC COR # BLD: 5.37 10*3/MM3 (ref 3.4–10.8)
WBC NRBC COR # BLD: 6.5 10*3/MM3 (ref 3.4–10.8)
WBC NRBC COR # BLD: 6.58 10*3/MM3 (ref 3.4–10.8)
WBC NRBC COR # BLD: 6.97 10*3/MM3 (ref 3.4–10.8)
WBC NRBC COR # BLD: 7.1 10*3/MM3 (ref 3.4–10.8)
WBC NRBC COR # BLD: 7.44 10*3/MM3 (ref 3.4–10.8)
WBC NRBC COR # BLD: 7.83 10*3/MM3 (ref 3.4–10.8)
WBC NRBC COR # BLD: 8 10*3/MM3 (ref 3.4–10.8)
WBC NRBC COR # BLD: 8.2 10*3/MM3 (ref 3.4–10.8)
WBC NRBC COR # BLD: 9.9 10*3/MM3 (ref 3.4–10.8)
WBC UR QL AUTO: NORMAL /HPF
WHOLE BLOOD HOLD SPECIMEN: NORMAL

## 2020-01-01 PROCEDURE — 25010000003 HEPARIN LOCK FLUCH PER 10 UNITS: Performed by: INTERNAL MEDICINE

## 2020-01-01 PROCEDURE — 25010000002 DIPHENHYDRAMINE PER 50 MG: Performed by: NURSE PRACTITIONER

## 2020-01-01 PROCEDURE — 84145 PROCALCITONIN (PCT): CPT

## 2020-01-01 PROCEDURE — 25010000002 DEXAMETHASONE PER 1 MG: Performed by: NURSE PRACTITIONER

## 2020-01-01 PROCEDURE — 99215 OFFICE O/P EST HI 40 MIN: CPT | Performed by: INTERNAL MEDICINE

## 2020-01-01 PROCEDURE — 25010000002 DEXAMETHASONE PER 1 MG: Performed by: INTERNAL MEDICINE

## 2020-01-01 PROCEDURE — 80048 BASIC METABOLIC PNL TOTAL CA: CPT | Performed by: NURSE PRACTITIONER

## 2020-01-01 PROCEDURE — 99239 HOSP IP/OBS DSCHRG MGMT >30: CPT | Performed by: INTERNAL MEDICINE

## 2020-01-01 PROCEDURE — 71275 CT ANGIOGRAPHY CHEST: CPT

## 2020-01-01 PROCEDURE — 71046 X-RAY EXAM CHEST 2 VIEWS: CPT | Performed by: THORACIC SURGERY (CARDIOTHORACIC VASCULAR SURGERY)

## 2020-01-01 PROCEDURE — 81001 URINALYSIS AUTO W/SCOPE: CPT | Performed by: PHYSICIAN ASSISTANT

## 2020-01-01 PROCEDURE — 99284 EMERGENCY DEPT VISIT MOD MDM: CPT

## 2020-01-01 PROCEDURE — 83615 LACTATE (LD) (LDH) ENZYME: CPT | Performed by: NURSE PRACTITIONER

## 2020-01-01 PROCEDURE — 96375 TX/PRO/DX INJ NEW DRUG ADDON: CPT

## 2020-01-01 PROCEDURE — 82805 BLOOD GASES W/O2 SATURATION: CPT

## 2020-01-01 PROCEDURE — 25010000002 PIPERACILLIN SOD-TAZOBACTAM PER 1 G: Performed by: NURSE PRACTITIONER

## 2020-01-01 PROCEDURE — 96413 CHEMO IV INFUSION 1 HR: CPT

## 2020-01-01 PROCEDURE — 71045 X-RAY EXAM CHEST 1 VIEW: CPT

## 2020-01-01 PROCEDURE — 85025 COMPLETE CBC W/AUTO DIFF WBC: CPT | Performed by: NURSE PRACTITIONER

## 2020-01-01 PROCEDURE — 25010000002 PACLITAXEL PER 30 MG: Performed by: INTERNAL MEDICINE

## 2020-01-01 PROCEDURE — 25010000002 PIPERACILLIN SOD-TAZOBACTAM PER 1 G: Performed by: PHYSICIAN ASSISTANT

## 2020-01-01 PROCEDURE — 99495 TRANSJ CARE MGMT MOD F2F 14D: CPT | Performed by: INTERNAL MEDICINE

## 2020-01-01 PROCEDURE — 85027 COMPLETE CBC AUTOMATED: CPT | Performed by: HOSPITALIST

## 2020-01-01 PROCEDURE — 63710000001 INSULIN LISPRO (HUMAN) PER 5 UNITS: Performed by: NURSE PRACTITIONER

## 2020-01-01 PROCEDURE — 25010000002 PACLITAXEL PER 30 MG: Performed by: NURSE PRACTITIONER

## 2020-01-01 PROCEDURE — 83880 ASSAY OF NATRIURETIC PEPTIDE: CPT | Performed by: EMERGENCY MEDICINE

## 2020-01-01 PROCEDURE — 63710000001 DRONABINOL PER 2.5 MG: Performed by: NURSE PRACTITIONER

## 2020-01-01 PROCEDURE — 36430 TRANSFUSION BLD/BLD COMPNT: CPT

## 2020-01-01 PROCEDURE — 94799 UNLISTED PULMONARY SVC/PX: CPT

## 2020-01-01 PROCEDURE — 0 IOPAMIDOL PER 1 ML: Performed by: EMERGENCY MEDICINE

## 2020-01-01 PROCEDURE — 80053 COMPREHEN METABOLIC PANEL: CPT | Performed by: EMERGENCY MEDICINE

## 2020-01-01 PROCEDURE — 85025 COMPLETE CBC W/AUTO DIFF WBC: CPT | Performed by: INTERNAL MEDICINE

## 2020-01-01 PROCEDURE — 36600 WITHDRAWAL OF ARTERIAL BLOOD: CPT

## 2020-01-01 PROCEDURE — 80053 COMPREHEN METABOLIC PANEL: CPT | Performed by: NURSE PRACTITIONER

## 2020-01-01 PROCEDURE — 82962 GLUCOSE BLOOD TEST: CPT

## 2020-01-01 PROCEDURE — 71046 X-RAY EXAM CHEST 2 VIEWS: CPT

## 2020-01-01 PROCEDURE — 63710000001 DIPHENHYDRAMINE PER 50 MG: Performed by: NURSE PRACTITIONER

## 2020-01-01 PROCEDURE — 86140 C-REACTIVE PROTEIN: CPT | Performed by: NURSE PRACTITIONER

## 2020-01-01 PROCEDURE — 96360 HYDRATION IV INFUSION INIT: CPT

## 2020-01-01 PROCEDURE — 84481 FREE ASSAY (FT-3): CPT | Performed by: NURSE PRACTITIONER

## 2020-01-01 PROCEDURE — 84484 ASSAY OF TROPONIN QUANT: CPT | Performed by: EMERGENCY MEDICINE

## 2020-01-01 PROCEDURE — 87040 BLOOD CULTURE FOR BACTERIA: CPT | Performed by: EMERGENCY MEDICINE

## 2020-01-01 PROCEDURE — 84484 ASSAY OF TROPONIN QUANT: CPT | Performed by: NURSE PRACTITIONER

## 2020-01-01 PROCEDURE — 93005 ELECTROCARDIOGRAM TRACING: CPT | Performed by: EMERGENCY MEDICINE

## 2020-01-01 PROCEDURE — 84145 PROCALCITONIN (PCT): CPT | Performed by: NURSE PRACTITIONER

## 2020-01-01 PROCEDURE — 83690 ASSAY OF LIPASE: CPT | Performed by: EMERGENCY MEDICINE

## 2020-01-01 PROCEDURE — 93005 ELECTROCARDIOGRAM TRACING: CPT

## 2020-01-01 PROCEDURE — 25010000002 ATEZOLIZUMAB 1200 MG/20ML SOLUTION 20 ML VIAL: Performed by: NURSE PRACTITIONER

## 2020-01-01 PROCEDURE — 80053 COMPREHEN METABOLIC PANEL: CPT | Performed by: INTERNAL MEDICINE

## 2020-01-01 PROCEDURE — 99233 SBSQ HOSP IP/OBS HIGH 50: CPT | Performed by: HOSPITALIST

## 2020-01-01 PROCEDURE — 25010000002 CEFTRIAXONE PER 250 MG: Performed by: NURSE PRACTITIONER

## 2020-01-01 PROCEDURE — 84145 PROCALCITONIN (PCT): CPT | Performed by: PHYSICIAN ASSISTANT

## 2020-01-01 PROCEDURE — 0100U HC BIOFIRE FILMARRAY RESP PANEL 2: CPT | Performed by: FAMILY MEDICINE

## 2020-01-01 PROCEDURE — 80048 BASIC METABOLIC PNL TOTAL CA: CPT | Performed by: HOSPITALIST

## 2020-01-01 PROCEDURE — 81003 URINALYSIS AUTO W/O SCOPE: CPT | Performed by: NURSE PRACTITIONER

## 2020-01-01 PROCEDURE — 99223 1ST HOSP IP/OBS HIGH 75: CPT | Performed by: INTERNAL MEDICINE

## 2020-01-01 PROCEDURE — 82140 ASSAY OF AMMONIA: CPT | Performed by: NURSE PRACTITIONER

## 2020-01-01 PROCEDURE — 99239 HOSP IP/OBS DSCHRG MGMT >30: CPT | Performed by: HOSPITALIST

## 2020-01-01 PROCEDURE — 99292 CRITICAL CARE ADDL 30 MIN: CPT | Performed by: INTERNAL MEDICINE

## 2020-01-01 PROCEDURE — 94640 AIRWAY INHALATION TREATMENT: CPT

## 2020-01-01 PROCEDURE — 25010000002 ONDANSETRON PER 1 MG: Performed by: NURSE PRACTITIONER

## 2020-01-01 PROCEDURE — 87502 INFLUENZA DNA AMP PROBE: CPT | Performed by: PHYSICIAN ASSISTANT

## 2020-01-01 PROCEDURE — 87635 SARS-COV-2 COVID-19 AMP PRB: CPT | Performed by: NURSE PRACTITIONER

## 2020-01-01 PROCEDURE — 25010000002 DIPHENHYDRAMINE PER 50 MG: Performed by: INTERNAL MEDICINE

## 2020-01-01 PROCEDURE — 96374 THER/PROPH/DIAG INJ IV PUSH: CPT

## 2020-01-01 PROCEDURE — 99213 OFFICE O/P EST LOW 20 MIN: CPT | Performed by: THORACIC SURGERY (CARDIOTHORACIC VASCULAR SURGERY)

## 2020-01-01 PROCEDURE — 25010000002 VANCOMYCIN PER 500 MG: Performed by: HOSPITALIST

## 2020-01-01 PROCEDURE — 87086 URINE CULTURE/COLONY COUNT: CPT | Performed by: PHYSICIAN ASSISTANT

## 2020-01-01 PROCEDURE — 25010000002 ONDANSETRON PER 1 MG: Performed by: INTERNAL MEDICINE

## 2020-01-01 PROCEDURE — 99214 OFFICE O/P EST MOD 30 MIN: CPT | Performed by: NURSE PRACTITIONER

## 2020-01-01 PROCEDURE — 74177 CT ABD & PELVIS W/CONTRAST: CPT

## 2020-01-01 PROCEDURE — 32552 REMOVE LUNG CATHETER: CPT | Performed by: THORACIC SURGERY (CARDIOTHORACIC VASCULAR SURGERY)

## 2020-01-01 PROCEDURE — 83036 HEMOGLOBIN GLYCOSYLATED A1C: CPT | Performed by: NURSE PRACTITIONER

## 2020-01-01 PROCEDURE — 25010000002 LORAZEPAM PER 2 MG: Performed by: NURSE PRACTITIONER

## 2020-01-01 PROCEDURE — 83605 ASSAY OF LACTIC ACID: CPT | Performed by: EMERGENCY MEDICINE

## 2020-01-01 PROCEDURE — 0100U HC BIOFIRE FILMARRAY RESP PANEL 2: CPT | Performed by: EMERGENCY MEDICINE

## 2020-01-01 PROCEDURE — 63710000001 INSULIN DETEMIR PER 5 UNITS: Performed by: HOSPITALIST

## 2020-01-01 PROCEDURE — 85610 PROTHROMBIN TIME: CPT | Performed by: NURSE PRACTITIONER

## 2020-01-01 PROCEDURE — 99223 1ST HOSP IP/OBS HIGH 75: CPT | Performed by: FAMILY MEDICINE

## 2020-01-01 PROCEDURE — 85025 COMPLETE CBC W/AUTO DIFF WBC: CPT | Performed by: EMERGENCY MEDICINE

## 2020-01-01 PROCEDURE — 25010000002 ATEZOLIZUMAB 1200 MG/20ML SOLUTION 20 ML VIAL: Performed by: INTERNAL MEDICINE

## 2020-01-01 PROCEDURE — 84443 ASSAY THYROID STIM HORMONE: CPT | Performed by: NURSE PRACTITIONER

## 2020-01-01 PROCEDURE — 36591 DRAW BLOOD OFF VENOUS DEVICE: CPT

## 2020-01-01 PROCEDURE — P9016 RBC LEUKOCYTES REDUCED: HCPCS

## 2020-01-01 PROCEDURE — 84439 ASSAY OF FREE THYROXINE: CPT | Performed by: NURSE PRACTITIONER

## 2020-01-01 PROCEDURE — 86900 BLOOD TYPING SEROLOGIC ABO: CPT

## 2020-01-01 PROCEDURE — 25010000002 VANCOMYCIN 10 G RECONSTITUTED SOLUTION: Performed by: PHYSICIAN ASSISTANT

## 2020-01-01 PROCEDURE — 25010000002 CEFTRIAXONE PER 250 MG: Performed by: EMERGENCY MEDICINE

## 2020-01-01 PROCEDURE — 99291 CRITICAL CARE FIRST HOUR: CPT | Performed by: INTERNAL MEDICINE

## 2020-01-01 RX ORDER — BISACODYL 10 MG
10 SUPPOSITORY, RECTAL RECTAL ONCE
Status: DISCONTINUED | OUTPATIENT
Start: 2020-01-01 | End: 2020-01-01 | Stop reason: HOSPADM

## 2020-01-01 RX ORDER — SODIUM CHLORIDE 9 MG/ML
250 INJECTION, SOLUTION INTRAVENOUS ONCE
Status: CANCELLED | OUTPATIENT
Start: 2020-01-01

## 2020-01-01 RX ORDER — MORPHINE SULFATE 15 MG/1
45 TABLET, FILM COATED, EXTENDED RELEASE ORAL 2 TIMES DAILY
Qty: 30 TABLET | Refills: 0 | Status: SHIPPED | OUTPATIENT
Start: 2020-01-01 | End: 2020-01-01

## 2020-01-01 RX ORDER — NICOTINE POLACRILEX 4 MG
15 LOZENGE BUCCAL
Status: DISCONTINUED | OUTPATIENT
Start: 2020-01-01 | End: 2020-01-01 | Stop reason: HOSPADM

## 2020-01-01 RX ORDER — LACTULOSE 10 G/15ML
10 SOLUTION ORAL
Status: DISCONTINUED | OUTPATIENT
Start: 2020-01-01 | End: 2020-01-01 | Stop reason: HOSPADM

## 2020-01-01 RX ORDER — DOCUSATE SODIUM 100 MG/1
200 CAPSULE, LIQUID FILLED ORAL 2 TIMES DAILY PRN
Qty: 120 CAPSULE | Refills: 3 | Status: SHIPPED | OUTPATIENT
Start: 2020-01-01

## 2020-01-01 RX ORDER — SODIUM CHLORIDE 9 MG/ML
250 INJECTION, SOLUTION INTRAVENOUS ONCE
Status: COMPLETED | OUTPATIENT
Start: 2020-01-01 | End: 2020-01-01

## 2020-01-01 RX ORDER — SODIUM CHLORIDE 0.9 % (FLUSH) 0.9 %
10 SYRINGE (ML) INJECTION EVERY 12 HOURS SCHEDULED
Status: DISCONTINUED | OUTPATIENT
Start: 2020-01-01 | End: 2020-01-01 | Stop reason: HOSPADM

## 2020-01-01 RX ORDER — DOXYCYCLINE HYCLATE 100 MG/1
100 CAPSULE ORAL 2 TIMES DAILY
Qty: 20 CAPSULE | Refills: 0 | Status: ON HOLD | OUTPATIENT
Start: 2020-01-01 | End: 2020-01-01

## 2020-01-01 RX ORDER — ACETAMINOPHEN 160 MG/5ML
650 SOLUTION ORAL EVERY 4 HOURS PRN
Status: DISCONTINUED | OUTPATIENT
Start: 2020-01-01 | End: 2020-01-01 | Stop reason: HOSPADM

## 2020-01-01 RX ORDER — OXYCODONE HYDROCHLORIDE 10 MG/1
10 TABLET ORAL
Qty: 120 TABLET | Refills: 0 | Status: SHIPPED | OUTPATIENT
Start: 2020-01-01 | End: 2020-01-01 | Stop reason: SDUPTHER

## 2020-01-01 RX ORDER — OXYCODONE HYDROCHLORIDE 5 MG/1
10 TABLET ORAL ONCE
Status: CANCELLED
Start: 2020-01-01

## 2020-01-01 RX ORDER — DIPHENHYDRAMINE HYDROCHLORIDE 50 MG/ML
50 INJECTION INTRAMUSCULAR; INTRAVENOUS AS NEEDED
Status: CANCELLED | OUTPATIENT
Start: 2020-01-01

## 2020-01-01 RX ORDER — ACETAMINOPHEN 325 MG/1
650 TABLET ORAL EVERY 4 HOURS PRN
Status: DISCONTINUED | OUTPATIENT
Start: 2020-01-01 | End: 2020-01-01 | Stop reason: HOSPADM

## 2020-01-01 RX ORDER — LORAZEPAM 1 MG/1
1 TABLET ORAL EVERY 8 HOURS PRN
Status: DISCONTINUED | OUTPATIENT
Start: 2020-01-01 | End: 2020-01-01 | Stop reason: HOSPADM

## 2020-01-01 RX ORDER — FAMOTIDINE 10 MG/ML
20 INJECTION, SOLUTION INTRAVENOUS ONCE
Status: COMPLETED | OUTPATIENT
Start: 2020-01-01 | End: 2020-01-01

## 2020-01-01 RX ORDER — LACTULOSE 10 G/15ML
10 SOLUTION ORAL 2 TIMES DAILY PRN
Status: DISCONTINUED | OUTPATIENT
Start: 2020-01-01 | End: 2020-01-01 | Stop reason: HOSPADM

## 2020-01-01 RX ORDER — VANCOMYCIN HYDROCHLORIDE 1 G/200ML
15 INJECTION, SOLUTION INTRAVENOUS EVERY 12 HOURS
Status: DISCONTINUED | OUTPATIENT
Start: 2020-01-01 | End: 2020-01-01

## 2020-01-01 RX ORDER — HEPARIN SODIUM (PORCINE) LOCK FLUSH IV SOLN 100 UNIT/ML 100 UNIT/ML
500 SOLUTION INTRAVENOUS AS NEEDED
Status: CANCELLED | OUTPATIENT
Start: 2020-01-01

## 2020-01-01 RX ORDER — CEFDINIR 300 MG/1
300 CAPSULE ORAL 2 TIMES DAILY
Qty: 10 CAPSULE | Refills: 0 | Status: SHIPPED | OUTPATIENT
Start: 2020-01-01 | End: 2020-01-01

## 2020-01-01 RX ORDER — DOXYCYCLINE 100 MG/1
100 CAPSULE ORAL EVERY 12 HOURS SCHEDULED
Qty: 11 CAPSULE | Refills: 0 | Status: SHIPPED | OUTPATIENT
Start: 2020-01-01 | End: 2020-01-01

## 2020-01-01 RX ORDER — GABAPENTIN 300 MG/1
300 CAPSULE ORAL 3 TIMES DAILY
COMMUNITY

## 2020-01-01 RX ORDER — SODIUM CHLORIDE 0.9 % (FLUSH) 0.9 %
10 SYRINGE (ML) INJECTION AS NEEDED
Status: DISCONTINUED | OUTPATIENT
Start: 2020-01-01 | End: 2020-01-01 | Stop reason: HOSPADM

## 2020-01-01 RX ORDER — SODIUM CHLORIDE 0.9 % (FLUSH) 0.9 %
10 SYRINGE (ML) INJECTION AS NEEDED
Status: CANCELLED | OUTPATIENT
Start: 2020-01-01

## 2020-01-01 RX ORDER — OXYCODONE HYDROCHLORIDE 5 MG/1
10 TABLET ORAL
Status: DISCONTINUED | OUTPATIENT
Start: 2020-01-01 | End: 2020-01-01 | Stop reason: HOSPADM

## 2020-01-01 RX ORDER — FLUTICASONE PROPIONATE 50 MCG
1 SPRAY, SUSPENSION (ML) NASAL 2 TIMES DAILY
Status: DISCONTINUED | OUTPATIENT
Start: 2020-01-01 | End: 2020-01-01 | Stop reason: HOSPADM

## 2020-01-01 RX ORDER — ACETAMINOPHEN 650 MG/1
650 SUPPOSITORY RECTAL EVERY 4 HOURS PRN
Status: DISCONTINUED | OUTPATIENT
Start: 2020-01-01 | End: 2020-01-01 | Stop reason: HOSPADM

## 2020-01-01 RX ORDER — ACETAMINOPHEN 325 MG/1
650 TABLET ORAL ONCE
Status: COMPLETED | OUTPATIENT
Start: 2020-01-01 | End: 2020-01-01

## 2020-01-01 RX ORDER — LIDOCAINE HYDROCHLORIDE 20 MG/ML
15 SOLUTION OROPHARYNGEAL ONCE
Status: COMPLETED | OUTPATIENT
Start: 2020-01-01 | End: 2020-01-01

## 2020-01-01 RX ORDER — CALCIUM CARBONATE 200(500)MG
1 TABLET,CHEWABLE ORAL 2 TIMES DAILY PRN
Status: DISCONTINUED | OUTPATIENT
Start: 2020-01-01 | End: 2020-01-01 | Stop reason: HOSPADM

## 2020-01-01 RX ORDER — FAMOTIDINE 10 MG/ML
20 INJECTION, SOLUTION INTRAVENOUS ONCE
Status: CANCELLED | OUTPATIENT
Start: 2020-01-01

## 2020-01-01 RX ORDER — HEPARIN SODIUM (PORCINE) LOCK FLUSH IV SOLN 100 UNIT/ML 100 UNIT/ML
500 SOLUTION INTRAVENOUS AS NEEDED
Status: DISCONTINUED | OUTPATIENT
Start: 2020-01-01 | End: 2020-01-01 | Stop reason: HOSPADM

## 2020-01-01 RX ORDER — LACTULOSE 10 G/15ML
10 SOLUTION ORAL ONCE
Status: COMPLETED | OUTPATIENT
Start: 2020-01-01 | End: 2020-01-01

## 2020-01-01 RX ORDER — VANCOMYCIN HYDROCHLORIDE 1 G/200ML
15 INJECTION, SOLUTION INTRAVENOUS EVERY 24 HOURS
Status: DISCONTINUED | OUTPATIENT
Start: 2020-01-01 | End: 2020-01-01

## 2020-01-01 RX ORDER — SODIUM CHLORIDE 9 MG/ML
1000 INJECTION, SOLUTION INTRAVENOUS ONCE
Status: CANCELLED
Start: 2020-01-01

## 2020-01-01 RX ORDER — ATORVASTATIN CALCIUM 40 MG/1
80 TABLET, FILM COATED ORAL NIGHTLY
Status: DISCONTINUED | OUTPATIENT
Start: 2020-01-01 | End: 2020-01-01 | Stop reason: HOSPADM

## 2020-01-01 RX ORDER — DRONABINOL 2.5 MG/1
2.5 CAPSULE ORAL
Status: DISCONTINUED | OUTPATIENT
Start: 2020-01-01 | End: 2020-01-01 | Stop reason: HOSPADM

## 2020-01-01 RX ORDER — DEXTROSE MONOHYDRATE 25 G/50ML
25 INJECTION, SOLUTION INTRAVENOUS
Status: DISCONTINUED | OUTPATIENT
Start: 2020-01-01 | End: 2020-01-01 | Stop reason: HOSPADM

## 2020-01-01 RX ORDER — DOCUSATE SODIUM 100 MG/1
200 CAPSULE, LIQUID FILLED ORAL 2 TIMES DAILY
Status: DISCONTINUED | OUTPATIENT
Start: 2020-01-01 | End: 2020-01-01 | Stop reason: HOSPADM

## 2020-01-01 RX ORDER — ASPIRIN 81 MG/1
81 TABLET ORAL DAILY
Status: DISCONTINUED | OUTPATIENT
Start: 2020-01-01 | End: 2020-01-01 | Stop reason: HOSPADM

## 2020-01-01 RX ORDER — L.ACID,PARA/B.BIFIDUM/S.THERM 8B CELL
1 CAPSULE ORAL DAILY
Status: DISCONTINUED | OUTPATIENT
Start: 2020-01-01 | End: 2020-01-01 | Stop reason: HOSPADM

## 2020-01-01 RX ORDER — BISACODYL 10 MG
10 SUPPOSITORY, RECTAL RECTAL DAILY PRN
Status: DISCONTINUED | OUTPATIENT
Start: 2020-01-01 | End: 2020-01-01 | Stop reason: HOSPADM

## 2020-01-01 RX ORDER — PREDNISONE 10 MG/1
TABLET ORAL SEE ADMIN INSTRUCTIONS
Qty: 1 EACH | Refills: 1 | Status: SHIPPED | OUTPATIENT
Start: 2020-01-01

## 2020-01-01 RX ORDER — AMOXICILLIN AND CLAVULANATE POTASSIUM 875; 125 MG/1; MG/1
1 TABLET, FILM COATED ORAL 2 TIMES DAILY
Qty: 10 TABLET | Refills: 0 | Status: SHIPPED | OUTPATIENT
Start: 2020-01-01 | End: 2020-01-01

## 2020-01-01 RX ORDER — ONDANSETRON 4 MG/1
8 TABLET, FILM COATED ORAL EVERY 8 HOURS PRN
Status: DISCONTINUED | OUTPATIENT
Start: 2020-01-01 | End: 2020-01-01 | Stop reason: HOSPADM

## 2020-01-01 RX ORDER — SODIUM CHLORIDE 9 MG/ML
1000 INJECTION, SOLUTION INTRAVENOUS ONCE
Status: COMPLETED | OUTPATIENT
Start: 2020-01-01 | End: 2020-01-01

## 2020-01-01 RX ORDER — MIRTAZAPINE 15 MG/1
15 TABLET, FILM COATED ORAL NIGHTLY
Status: DISCONTINUED | OUTPATIENT
Start: 2020-01-01 | End: 2020-01-01 | Stop reason: HOSPADM

## 2020-01-01 RX ORDER — L.ACID,PARA/B.BIFIDUM/S.THERM 8B CELL
1 CAPSULE ORAL DAILY
Qty: 5 CAPSULE | Refills: 0 | Status: SHIPPED | OUTPATIENT
Start: 2020-01-01 | End: 2020-01-01

## 2020-01-01 RX ORDER — ZOLPIDEM TARTRATE 5 MG/1
10 TABLET ORAL NIGHTLY PRN
Status: DISCONTINUED | OUTPATIENT
Start: 2020-01-01 | End: 2020-01-01 | Stop reason: HOSPADM

## 2020-01-01 RX ORDER — SODIUM CHLORIDE 9 MG/ML
100 INJECTION, SOLUTION INTRAVENOUS CONTINUOUS
Status: DISCONTINUED | OUTPATIENT
Start: 2020-01-01 | End: 2020-01-01 | Stop reason: HOSPADM

## 2020-01-01 RX ORDER — HYDROXYZINE HYDROCHLORIDE 25 MG/1
25 TABLET, FILM COATED ORAL 3 TIMES DAILY PRN
Status: DISCONTINUED | OUTPATIENT
Start: 2020-01-01 | End: 2020-01-01 | Stop reason: HOSPADM

## 2020-01-01 RX ORDER — GABAPENTIN 300 MG/1
300 CAPSULE ORAL 3 TIMES DAILY
Status: DISCONTINUED | OUTPATIENT
Start: 2020-01-01 | End: 2020-01-01 | Stop reason: HOSPADM

## 2020-01-01 RX ORDER — AMOXICILLIN 250 MG
2 CAPSULE ORAL NIGHTLY
Status: DISCONTINUED | OUTPATIENT
Start: 2020-01-01 | End: 2020-01-01 | Stop reason: HOSPADM

## 2020-01-01 RX ORDER — DOCUSATE SODIUM 100 MG/1
100 CAPSULE, LIQUID FILLED ORAL 2 TIMES DAILY PRN
Qty: 120 CAPSULE | Refills: 3 | Status: SHIPPED | OUTPATIENT
Start: 2020-01-01 | End: 2020-01-01 | Stop reason: SDUPTHER

## 2020-01-01 RX ORDER — SODIUM CHLORIDE 9 MG/ML
250 INJECTION, SOLUTION INTRAVENOUS AS NEEDED
Status: DISCONTINUED | OUTPATIENT
Start: 2020-01-01 | End: 2020-01-01 | Stop reason: HOSPADM

## 2020-01-01 RX ORDER — ONDANSETRON 2 MG/ML
4 INJECTION INTRAMUSCULAR; INTRAVENOUS ONCE
Status: DISCONTINUED | OUTPATIENT
Start: 2020-01-01 | End: 2020-01-01 | Stop reason: HOSPADM

## 2020-01-01 RX ORDER — ONDANSETRON 2 MG/ML
4 INJECTION INTRAMUSCULAR; INTRAVENOUS EVERY 6 HOURS PRN
Status: DISCONTINUED | OUTPATIENT
Start: 2020-01-01 | End: 2020-01-01 | Stop reason: HOSPADM

## 2020-01-01 RX ORDER — LORAZEPAM 1 MG/1
0.5 TABLET ORAL EVERY 8 HOURS PRN
Qty: 15 TABLET | Refills: 0 | OUTPATIENT
Start: 2020-01-01 | End: 2020-01-01

## 2020-01-01 RX ORDER — ATORVASTATIN CALCIUM 80 MG/1
80 TABLET, FILM COATED ORAL NIGHTLY
Qty: 90 TABLET | Refills: 3 | Status: SHIPPED | OUTPATIENT
Start: 2020-01-01

## 2020-01-01 RX ORDER — SODIUM CHLORIDE 9 MG/ML
250 INJECTION, SOLUTION INTRAVENOUS ONCE
Status: DISCONTINUED | OUTPATIENT
Start: 2020-01-01 | End: 2020-01-01 | Stop reason: HOSPADM

## 2020-01-01 RX ORDER — OXYCODONE HYDROCHLORIDE AND ACETAMINOPHEN 5; 325 MG/1; MG/1
2 TABLET ORAL AS NEEDED
Status: DISCONTINUED | OUTPATIENT
Start: 2020-01-01 | End: 2020-01-01 | Stop reason: SDUPTHER

## 2020-01-01 RX ORDER — MORPHINE SULFATE 15 MG/1
45 TABLET, FILM COATED, EXTENDED RELEASE ORAL 2 TIMES DAILY
Qty: 180 TABLET | Refills: 0 | Status: SHIPPED | OUTPATIENT
Start: 2020-01-01 | End: 2020-01-01 | Stop reason: HOSPADM

## 2020-01-01 RX ORDER — ZOLPIDEM TARTRATE 10 MG/1
10 TABLET ORAL NIGHTLY PRN
Qty: 30 TABLET | Refills: 2 | Status: SHIPPED | OUTPATIENT
Start: 2020-01-01

## 2020-01-01 RX ORDER — HYDROXYZINE HYDROCHLORIDE 25 MG/1
25 TABLET, FILM COATED ORAL 3 TIMES DAILY PRN
Qty: 15 TABLET | Refills: 0 | Status: SHIPPED | OUTPATIENT
Start: 2020-01-01 | End: 2020-01-01

## 2020-01-01 RX ORDER — ALUMINA, MAGNESIA, AND SIMETHICONE 2400; 2400; 240 MG/30ML; MG/30ML; MG/30ML
15 SUSPENSION ORAL ONCE
Status: COMPLETED | OUTPATIENT
Start: 2020-01-01 | End: 2020-01-01

## 2020-01-01 RX ORDER — LACTULOSE 10 G/15ML
10 SOLUTION ORAL EVERY 4 HOURS
Status: COMPLETED | OUTPATIENT
Start: 2020-01-01 | End: 2020-01-01

## 2020-01-01 RX ORDER — OXYCODONE HYDROCHLORIDE 10 MG/1
10 TABLET ORAL
Qty: 120 TABLET | Refills: 0 | Status: ON HOLD | OUTPATIENT
Start: 2020-01-01 | End: 2020-01-01 | Stop reason: SDUPTHER

## 2020-01-01 RX ORDER — FAMOTIDINE 10 MG/ML
20 INJECTION, SOLUTION INTRAVENOUS AS NEEDED
Status: CANCELLED | OUTPATIENT
Start: 2020-01-01

## 2020-01-01 RX ORDER — MORPHINE SULFATE 15 MG/1
45 TABLET, FILM COATED, EXTENDED RELEASE ORAL 2 TIMES DAILY
Qty: 180 TABLET | Refills: 0 | Status: SHIPPED | OUTPATIENT
Start: 2020-01-01 | End: 2020-01-01 | Stop reason: SDUPTHER

## 2020-01-01 RX ORDER — DIPHENHYDRAMINE HCL 25 MG
25 CAPSULE ORAL ONCE
Status: COMPLETED | OUTPATIENT
Start: 2020-01-01 | End: 2020-01-01

## 2020-01-01 RX ORDER — GUAIFENESIN 600 MG/1
600 TABLET, EXTENDED RELEASE ORAL EVERY 12 HOURS SCHEDULED
Status: DISCONTINUED | OUTPATIENT
Start: 2020-01-01 | End: 2020-01-01 | Stop reason: HOSPADM

## 2020-01-01 RX ORDER — DOXYCYCLINE 100 MG/1
100 CAPSULE ORAL EVERY 12 HOURS SCHEDULED
Status: DISCONTINUED | OUTPATIENT
Start: 2020-01-01 | End: 2020-01-01 | Stop reason: HOSPADM

## 2020-01-01 RX ORDER — DOCUSATE SODIUM 100 MG/1
200 CAPSULE, LIQUID FILLED ORAL 2 TIMES DAILY PRN
Status: DISCONTINUED | OUTPATIENT
Start: 2020-01-01 | End: 2020-01-01 | Stop reason: HOSPADM

## 2020-01-01 RX ORDER — ASPIRIN 81 MG/1
324 TABLET, CHEWABLE ORAL ONCE
Status: DISCONTINUED | OUTPATIENT
Start: 2020-01-01 | End: 2020-01-01 | Stop reason: HOSPADM

## 2020-01-01 RX ORDER — HEPARIN SODIUM (PORCINE) LOCK FLUSH IV SOLN 100 UNIT/ML 100 UNIT/ML
500 SOLUTION INTRAVENOUS ONCE
Status: COMPLETED | OUTPATIENT
Start: 2020-01-01 | End: 2020-01-01

## 2020-01-01 RX ORDER — AMOXICILLIN 250 MG
2 CAPSULE ORAL 2 TIMES DAILY PRN
Status: DISCONTINUED | OUTPATIENT
Start: 2020-01-01 | End: 2020-01-01 | Stop reason: HOSPADM

## 2020-01-01 RX ORDER — LORAZEPAM 2 MG/ML
0.5 INJECTION INTRAMUSCULAR ONCE
Status: COMPLETED | OUTPATIENT
Start: 2020-01-01 | End: 2020-01-01

## 2020-01-01 RX ORDER — NATEGLINIDE 120 MG/1
120 TABLET ORAL
Qty: 270 TABLET | Refills: 3 | Status: SHIPPED | OUTPATIENT
Start: 2020-01-01

## 2020-01-01 RX ORDER — IPRATROPIUM BROMIDE AND ALBUTEROL SULFATE 2.5; .5 MG/3ML; MG/3ML
3 SOLUTION RESPIRATORY (INHALATION) EVERY 4 HOURS PRN
Status: DISCONTINUED | OUTPATIENT
Start: 2020-01-01 | End: 2020-01-01 | Stop reason: HOSPADM

## 2020-01-01 RX ORDER — OXYCODONE HYDROCHLORIDE 10 MG/1
10 TABLET ORAL
Qty: 20 TABLET | Refills: 0 | Status: SHIPPED | OUTPATIENT
Start: 2020-01-01 | End: 2020-01-01

## 2020-01-01 RX ORDER — OXYCODONE HYDROCHLORIDE AND ACETAMINOPHEN 5; 325 MG/1; MG/1
2 TABLET ORAL DAILY PRN
Status: DISCONTINUED | OUTPATIENT
Start: 2020-01-01 | End: 2020-01-01 | Stop reason: HOSPADM

## 2020-01-01 RX ORDER — LIDOCAINE AND PRILOCAINE 25; 25 MG/G; MG/G
CREAM TOPICAL AS NEEDED
Status: DISCONTINUED | OUTPATIENT
Start: 2020-01-01 | End: 2020-01-01 | Stop reason: HOSPADM

## 2020-01-01 RX ORDER — DRONABINOL 2.5 MG/1
2.5 CAPSULE ORAL
Qty: 60 CAPSULE | Refills: 0 | Status: SHIPPED | OUTPATIENT
Start: 2020-01-01

## 2020-01-01 RX ORDER — DOCUSATE SODIUM 100 MG/1
200 CAPSULE, LIQUID FILLED ORAL 2 TIMES DAILY PRN
Status: DISCONTINUED | OUTPATIENT
Start: 2020-01-01 | End: 2020-01-01

## 2020-01-01 RX ORDER — MORPHINE SULFATE 4 MG/ML
4 INJECTION, SOLUTION INTRAMUSCULAR; INTRAVENOUS
Status: DISCONTINUED | OUTPATIENT
Start: 2020-01-01 | End: 2020-01-01 | Stop reason: HOSPADM

## 2020-01-01 RX ORDER — ALPRAZOLAM 0.5 MG/1
TABLET ORAL
Qty: 30 TABLET | Refills: 2 | Status: SHIPPED | OUTPATIENT
Start: 2020-01-01

## 2020-01-01 RX ADMIN — OXYCODONE HYDROCHLORIDE AND ACETAMINOPHEN 2 TABLET: 5; 325 TABLET ORAL at 15:57

## 2020-01-01 RX ADMIN — OXYCODONE HYDROCHLORIDE 10 MG: 5 TABLET ORAL at 09:03

## 2020-01-01 RX ADMIN — HEPARIN 500 UNITS: 100 SYRINGE at 15:47

## 2020-01-01 RX ADMIN — APIXABAN 5 MG: 5 TABLET, FILM COATED ORAL at 09:02

## 2020-01-01 RX ADMIN — SODIUM CHLORIDE 155 MG: 9 INJECTION, SOLUTION INTRAVENOUS at 14:18

## 2020-01-01 RX ADMIN — Medication 1 CAPSULE: at 09:02

## 2020-01-01 RX ADMIN — INSULIN LISPRO 4 UNITS: 100 INJECTION, SOLUTION INTRAVENOUS; SUBCUTANEOUS at 11:46

## 2020-01-01 RX ADMIN — MIRTAZAPINE 15 MG: 15 TABLET, FILM COATED ORAL at 21:58

## 2020-01-01 RX ADMIN — ALUMINUM HYDROXIDE, MAGNESIUM HYDROXIDE, AND DIMETHICONE 15 ML: 400; 400; 40 SUSPENSION ORAL at 14:42

## 2020-01-01 RX ADMIN — GUAIFENESIN 600 MG: 600 TABLET, EXTENDED RELEASE ORAL at 12:04

## 2020-01-01 RX ADMIN — DORNASE ALFA 2.5 MG: 1 SOLUTION RESPIRATORY (INHALATION) at 13:12

## 2020-01-01 RX ADMIN — SODIUM CHLORIDE 250 ML: 9 INJECTION, SOLUTION INTRAVENOUS at 14:45

## 2020-01-01 RX ADMIN — TAZOBACTAM SODIUM AND PIPERACILLIN SODIUM 3.38 G: 375; 3 INJECTION, SOLUTION INTRAVENOUS at 06:32

## 2020-01-01 RX ADMIN — OXYCODONE HYDROCHLORIDE 10 MG: 5 TABLET ORAL at 06:35

## 2020-01-01 RX ADMIN — MORPHINE SULFATE 45 MG: 30 TABLET, FILM COATED, EXTENDED RELEASE ORAL at 21:58

## 2020-01-01 RX ADMIN — OXYCODONE HYDROCHLORIDE 10 MG: 5 TABLET ORAL at 08:12

## 2020-01-01 RX ADMIN — DIPHENHYDRAMINE HYDROCHLORIDE 25 MG: 50 INJECTION, SOLUTION INTRAMUSCULAR; INTRAVENOUS at 13:42

## 2020-01-01 RX ADMIN — SODIUM CHLORIDE 155 MG: 9 INJECTION, SOLUTION INTRAVENOUS at 14:43

## 2020-01-01 RX ADMIN — SODIUM CHLORIDE 155 MG: 9 INJECTION, SOLUTION INTRAVENOUS at 15:10

## 2020-01-01 RX ADMIN — DEXAMETHASONE SODIUM PHOSPHATE 12 MG: 4 INJECTION, SOLUTION INTRAMUSCULAR; INTRAVENOUS at 13:43

## 2020-01-01 RX ADMIN — SODIUM CHLORIDE, PRESERVATIVE FREE 10 ML: 5 INJECTION INTRAVENOUS at 09:02

## 2020-01-01 RX ADMIN — MIRTAZAPINE 15 MG: 15 TABLET, FILM COATED ORAL at 20:09

## 2020-01-01 RX ADMIN — FLUTICASONE PROPIONATE 1 SPRAY: 50 SPRAY, METERED NASAL at 21:59

## 2020-01-01 RX ADMIN — OXYCODONE HYDROCHLORIDE 10 MG: 5 TABLET ORAL at 12:24

## 2020-01-01 RX ADMIN — SODIUM CHLORIDE 250 ML: 9 INJECTION, SOLUTION INTRAVENOUS at 14:26

## 2020-01-01 RX ADMIN — DRONABINOL 2.5 MG: 2.5 CAPSULE ORAL at 08:09

## 2020-01-01 RX ADMIN — GABAPENTIN 300 MG: 300 CAPSULE ORAL at 21:36

## 2020-01-01 RX ADMIN — ATORVASTATIN CALCIUM 80 MG: 40 TABLET, FILM COATED ORAL at 20:10

## 2020-01-01 RX ADMIN — LIDOCAINE HYDROCHLORIDE 15 ML: 20 SOLUTION ORAL; TOPICAL at 14:43

## 2020-01-01 RX ADMIN — INSULIN DETEMIR 10 UNITS: 100 INJECTION, SOLUTION SUBCUTANEOUS at 22:00

## 2020-01-01 RX ADMIN — Medication 1 CAPSULE: at 20:10

## 2020-01-01 RX ADMIN — TAZOBACTAM SODIUM AND PIPERACILLIN SODIUM 3.38 G: 375; 3 INJECTION, SOLUTION INTRAVENOUS at 23:45

## 2020-01-01 RX ADMIN — DIPHENHYDRAMINE HYDROCHLORIDE 25 MG: 50 INJECTION INTRAMUSCULAR; INTRAVENOUS at 14:45

## 2020-01-01 RX ADMIN — TAZOBACTAM SODIUM AND PIPERACILLIN SODIUM 3.38 G: 375; 3 INJECTION, SOLUTION INTRAVENOUS at 17:11

## 2020-01-01 RX ADMIN — DOXYCYCLINE 100 MG: 100 CAPSULE ORAL at 08:11

## 2020-01-01 RX ADMIN — DOXYCYCLINE 100 MG: 100 INJECTION, POWDER, LYOPHILIZED, FOR SOLUTION INTRAVENOUS at 03:23

## 2020-01-01 RX ADMIN — SODIUM CHLORIDE 250 ML: 9 INJECTION, SOLUTION INTRAVENOUS at 13:30

## 2020-01-01 RX ADMIN — APIXABAN 5 MG: 5 TABLET, FILM COATED ORAL at 21:58

## 2020-01-01 RX ADMIN — Medication 1 CAPSULE: at 09:30

## 2020-01-01 RX ADMIN — MORPHINE SULFATE 45 MG: 30 TABLET, FILM COATED, EXTENDED RELEASE ORAL at 09:30

## 2020-01-01 RX ADMIN — ASPIRIN 81 MG: 81 TABLET, COATED ORAL at 20:10

## 2020-01-01 RX ADMIN — DRONABINOL 2.5 MG: 2.5 CAPSULE ORAL at 06:39

## 2020-01-01 RX ADMIN — SODIUM CHLORIDE 250 ML: 9 INJECTION, SOLUTION INTRAVENOUS at 14:49

## 2020-01-01 RX ADMIN — FLUTICASONE PROPIONATE 1 SPRAY: 50 SPRAY, METERED NASAL at 09:30

## 2020-01-01 RX ADMIN — LACTULOSE 10 G: 20 SOLUTION ORAL at 10:34

## 2020-01-01 RX ADMIN — ASPIRIN 81 MG: 81 TABLET, COATED ORAL at 09:02

## 2020-01-01 RX ADMIN — MORPHINE SULFATE 45 MG: 30 TABLET, FILM COATED, EXTENDED RELEASE ORAL at 09:03

## 2020-01-01 RX ADMIN — GABAPENTIN 300 MG: 300 CAPSULE ORAL at 15:07

## 2020-01-01 RX ADMIN — ASPIRIN 81 MG: 81 TABLET, COATED ORAL at 08:54

## 2020-01-01 RX ADMIN — MIRTAZAPINE 15 MG: 15 TABLET, FILM COATED ORAL at 21:36

## 2020-01-01 RX ADMIN — DOXYCYCLINE 100 MG: 100 CAPSULE ORAL at 21:36

## 2020-01-01 RX ADMIN — LACTULOSE 10 G: 10 SOLUTION ORAL at 16:59

## 2020-01-01 RX ADMIN — DORNASE ALFA 2.5 MG: 1 SOLUTION RESPIRATORY (INHALATION) at 08:10

## 2020-01-01 RX ADMIN — SODIUM CHLORIDE 100 ML/HR: 9 INJECTION, SOLUTION INTRAVENOUS at 23:48

## 2020-01-01 RX ADMIN — FAMOTIDINE 20 MG: 10 INJECTION INTRAVENOUS at 14:27

## 2020-01-01 RX ADMIN — SODIUM CHLORIDE, PRESERVATIVE FREE 10 ML: 5 INJECTION INTRAVENOUS at 08:53

## 2020-01-01 RX ADMIN — INSULIN LISPRO 5 UNITS: 100 INJECTION, SOLUTION INTRAVENOUS; SUBCUTANEOUS at 09:36

## 2020-01-01 RX ADMIN — GABAPENTIN 300 MG: 300 CAPSULE ORAL at 06:39

## 2020-01-01 RX ADMIN — OXYCODONE HYDROCHLORIDE 10 MG: 5 TABLET ORAL at 21:58

## 2020-01-01 RX ADMIN — ACETAMINOPHEN 650 MG: 325 TABLET ORAL at 12:06

## 2020-01-01 RX ADMIN — LACTULOSE 10 G: 10 SOLUTION ORAL at 13:51

## 2020-01-01 RX ADMIN — HYDROXYZINE HYDROCHLORIDE 25 MG: 25 TABLET, FILM COATED ORAL at 16:51

## 2020-01-01 RX ADMIN — ATEZOLIZUMAB 1200 MG: 1200 INJECTION, SOLUTION INTRAVENOUS at 15:08

## 2020-01-01 RX ADMIN — SODIUM CHLORIDE, PRESERVATIVE FREE 10 ML: 5 INJECTION INTRAVENOUS at 09:31

## 2020-01-01 RX ADMIN — SODIUM CHLORIDE, PRESERVATIVE FREE 10 ML: 5 INJECTION INTRAVENOUS at 20:10

## 2020-01-01 RX ADMIN — DOCUSATE SODIUM 200 MG: 100 CAPSULE, LIQUID FILLED ORAL at 09:02

## 2020-01-01 RX ADMIN — DIPHENHYDRAMINE HYDROCHLORIDE 25 MG: 25 CAPSULE ORAL at 12:06

## 2020-01-01 RX ADMIN — INSULIN LISPRO 2 UNITS: 100 INJECTION, SOLUTION INTRAVENOUS; SUBCUTANEOUS at 08:53

## 2020-01-01 RX ADMIN — SODIUM CHLORIDE 1000 ML: 9 INJECTION, SOLUTION INTRAVENOUS at 14:41

## 2020-01-01 RX ADMIN — GABAPENTIN 300 MG: 300 CAPSULE ORAL at 20:09

## 2020-01-01 RX ADMIN — VANCOMYCIN HYDROCHLORIDE 1250 MG: 10 INJECTION, POWDER, LYOPHILIZED, FOR SOLUTION INTRAVENOUS at 15:48

## 2020-01-01 RX ADMIN — ONDANSETRON 16 MG: 2 INJECTION INTRAMUSCULAR; INTRAVENOUS at 14:49

## 2020-01-01 RX ADMIN — MORPHINE SULFATE 45 MG: 30 TABLET, FILM COATED, EXTENDED RELEASE ORAL at 08:09

## 2020-01-01 RX ADMIN — FAMOTIDINE 20 MG: 10 INJECTION INTRAVENOUS at 13:47

## 2020-01-01 RX ADMIN — INSULIN LISPRO 6 UNITS: 100 INJECTION, SOLUTION INTRAVENOUS; SUBCUTANEOUS at 18:16

## 2020-01-01 RX ADMIN — DEXAMETHASONE SODIUM PHOSPHATE 12 MG: 4 INJECTION, SOLUTION INTRAMUSCULAR; INTRAVENOUS at 13:48

## 2020-01-01 RX ADMIN — TAZOBACTAM SODIUM AND PIPERACILLIN SODIUM 3.38 G: 375; 3 INJECTION, SOLUTION INTRAVENOUS at 06:24

## 2020-01-01 RX ADMIN — HEPARIN 500 UNITS: 100 SYRINGE at 12:42

## 2020-01-01 RX ADMIN — GUAIFENESIN 600 MG: 600 TABLET, EXTENDED RELEASE ORAL at 21:58

## 2020-01-01 RX ADMIN — HEPARIN 500 UNITS: 100 SYRINGE at 16:10

## 2020-01-01 RX ADMIN — DEXAMETHASONE SODIUM PHOSPHATE 12 MG: 4 INJECTION, SOLUTION INTRAMUSCULAR; INTRAVENOUS at 14:27

## 2020-01-01 RX ADMIN — GABAPENTIN 300 MG: 300 CAPSULE ORAL at 21:58

## 2020-01-01 RX ADMIN — OXYCODONE HYDROCHLORIDE 10 MG: 5 TABLET ORAL at 12:04

## 2020-01-01 RX ADMIN — FLUTICASONE PROPIONATE 1 SPRAY: 50 SPRAY, METERED NASAL at 09:02

## 2020-01-01 RX ADMIN — HEPARIN 500 UNITS: 100 SYRINGE at 15:18

## 2020-01-01 RX ADMIN — SODIUM CHLORIDE, PRESERVATIVE FREE 10 ML: 5 INJECTION INTRAVENOUS at 08:00

## 2020-01-01 RX ADMIN — APIXABAN 5 MG: 5 TABLET, FILM COATED ORAL at 09:30

## 2020-01-01 RX ADMIN — HEPARIN 500 UNITS: 100 SYRINGE at 15:43

## 2020-01-01 RX ADMIN — GUAIFENESIN 600 MG: 600 TABLET, EXTENDED RELEASE ORAL at 09:02

## 2020-01-01 RX ADMIN — GABAPENTIN 300 MG: 300 CAPSULE ORAL at 09:02

## 2020-01-01 RX ADMIN — SODIUM CHLORIDE 155 MG: 9 INJECTION, SOLUTION INTRAVENOUS at 15:08

## 2020-01-01 RX ADMIN — PACLITAXEL 155 MG: 300 INJECTION INTRAVENOUS at 14:05

## 2020-01-01 RX ADMIN — MORPHINE SULFATE 45 MG: 30 TABLET, FILM COATED, EXTENDED RELEASE ORAL at 08:54

## 2020-01-01 RX ADMIN — INSULIN LISPRO 3 UNITS: 100 INJECTION, SOLUTION INTRAVENOUS; SUBCUTANEOUS at 12:36

## 2020-01-01 RX ADMIN — CEFTRIAXONE 2 G: 2 INJECTION, POWDER, FOR SOLUTION INTRAMUSCULAR; INTRAVENOUS at 02:09

## 2020-01-01 RX ADMIN — ASPIRIN 81 MG: 81 TABLET, COATED ORAL at 09:29

## 2020-01-01 RX ADMIN — SODIUM CHLORIDE, PRESERVATIVE FREE 10 ML: 5 INJECTION INTRAVENOUS at 15:43

## 2020-01-01 RX ADMIN — FLUTICASONE PROPIONATE 1 SPRAY: 50 SPRAY, METERED NASAL at 20:10

## 2020-01-01 RX ADMIN — HEPARIN 500 UNITS: 100 SYRINGE at 16:15

## 2020-01-01 RX ADMIN — SODIUM CHLORIDE 100 ML/HR: 9 INJECTION, SOLUTION INTRAVENOUS at 10:40

## 2020-01-01 RX ADMIN — SODIUM CHLORIDE 1 G: 900 INJECTION INTRAVENOUS at 21:36

## 2020-01-01 RX ADMIN — FAMOTIDINE 20 MG: 10 INJECTION INTRAVENOUS at 13:43

## 2020-01-01 RX ADMIN — DRONABINOL 2.5 MG: 2.5 CAPSULE ORAL at 18:16

## 2020-01-01 RX ADMIN — ATORVASTATIN CALCIUM 80 MG: 40 TABLET, FILM COATED ORAL at 21:58

## 2020-01-01 RX ADMIN — VANCOMYCIN HYDROCHLORIDE 1000 MG: 1 INJECTION, SOLUTION INTRAVENOUS at 23:45

## 2020-01-01 RX ADMIN — OXYCODONE HYDROCHLORIDE 10 MG: 5 TABLET ORAL at 16:59

## 2020-01-01 RX ADMIN — OXYCODONE HYDROCHLORIDE 10 MG: 5 TABLET ORAL at 21:36

## 2020-01-01 RX ADMIN — SODIUM CHLORIDE, PRESERVATIVE FREE 10 ML: 5 INJECTION INTRAVENOUS at 03:35

## 2020-01-01 RX ADMIN — LACTULOSE 10 G: 20 SOLUTION ORAL at 15:06

## 2020-01-01 RX ADMIN — INSULIN LISPRO 3 UNITS: 100 INJECTION, SOLUTION INTRAVENOUS; SUBCUTANEOUS at 09:03

## 2020-01-01 RX ADMIN — SODIUM CHLORIDE 250 ML: 9 INJECTION, SOLUTION INTRAVENOUS at 13:46

## 2020-01-01 RX ADMIN — HEPARIN 500 UNITS: 100 SYRINGE at 11:41

## 2020-01-01 RX ADMIN — DEXAMETHASONE SODIUM PHOSPHATE 12 MG: 4 INJECTION, SOLUTION INTRAMUSCULAR; INTRAVENOUS at 14:55

## 2020-01-01 RX ADMIN — DOXYCYCLINE 100 MG: 100 CAPSULE ORAL at 13:06

## 2020-01-01 RX ADMIN — DOCUSATE SODIUM 200 MG: 100 CAPSULE, LIQUID FILLED ORAL at 13:51

## 2020-01-01 RX ADMIN — HEPARIN 500 UNITS: 100 SYRINGE at 16:00

## 2020-01-01 RX ADMIN — OXYCODONE HYDROCHLORIDE 10 MG: 5 TABLET ORAL at 06:39

## 2020-01-01 RX ADMIN — SODIUM CHLORIDE 250 ML: 9 INJECTION, SOLUTION INTRAVENOUS at 14:56

## 2020-01-01 RX ADMIN — APIXABAN 5 MG: 5 TABLET, FILM COATED ORAL at 08:54

## 2020-01-01 RX ADMIN — LORAZEPAM 0.5 MG: 2 INJECTION INTRAMUSCULAR; INTRAVENOUS at 13:26

## 2020-01-01 RX ADMIN — GABAPENTIN 300 MG: 300 CAPSULE ORAL at 15:41

## 2020-01-01 RX ADMIN — MORPHINE SULFATE 45 MG: 30 TABLET, FILM COATED, EXTENDED RELEASE ORAL at 20:09

## 2020-01-01 RX ADMIN — IOPAMIDOL 95 ML: 755 INJECTION, SOLUTION INTRAVENOUS at 15:17

## 2020-01-01 RX ADMIN — SODIUM CHLORIDE 2028 ML: 9 INJECTION, SOLUTION INTRAVENOUS at 15:47

## 2020-01-01 RX ADMIN — INSULIN LISPRO 5 UNITS: 100 INJECTION, SOLUTION INTRAVENOUS; SUBCUTANEOUS at 17:07

## 2020-01-01 RX ADMIN — IPRATROPIUM BROMIDE AND ALBUTEROL SULFATE 3 ML: 2.5; .5 SOLUTION RESPIRATORY (INHALATION) at 13:12

## 2020-01-01 RX ADMIN — OXYCODONE HYDROCHLORIDE 10 MG: 5 TABLET ORAL at 18:16

## 2020-01-01 RX ADMIN — HEPARIN 500 UNITS: 100 SYRINGE at 15:14

## 2020-01-01 RX ADMIN — DIPHENHYDRAMINE HYDROCHLORIDE 50 MG: 50 INJECTION INTRAMUSCULAR; INTRAVENOUS at 14:56

## 2020-01-01 RX ADMIN — ACETAMINOPHEN 650 MG: 325 TABLET, FILM COATED ORAL at 17:11

## 2020-01-01 RX ADMIN — SODIUM CHLORIDE 100 ML/HR: 9 INJECTION, SOLUTION INTRAVENOUS at 18:35

## 2020-01-01 RX ADMIN — INSULIN LISPRO 5 UNITS: 100 INJECTION, SOLUTION INTRAVENOUS; SUBCUTANEOUS at 21:34

## 2020-01-01 RX ADMIN — DIPHENHYDRAMINE HYDROCHLORIDE 25 MG: 50 INJECTION INTRAMUSCULAR; INTRAVENOUS at 13:47

## 2020-01-01 RX ADMIN — OXYCODONE HYDROCHLORIDE 10 MG: 5 TABLET ORAL at 11:41

## 2020-01-01 RX ADMIN — ATEZOLIZUMAB 1200 MG: 1200 INJECTION, SOLUTION INTRAVENOUS at 14:41

## 2020-01-01 RX ADMIN — ASPIRIN 81 MG: 81 TABLET, COATED ORAL at 08:11

## 2020-01-01 RX ADMIN — POLYETHYLENE GLYCOL 3350 17 G: 17 POWDER, FOR SOLUTION ORAL at 20:09

## 2020-01-01 RX ADMIN — OXYCODONE HYDROCHLORIDE 10 MG: 5 TABLET ORAL at 13:05

## 2020-01-01 RX ADMIN — TAZOBACTAM SODIUM AND PIPERACILLIN SODIUM 3.38 G: 375; 3 INJECTION, SOLUTION INTRAVENOUS at 15:07

## 2020-01-01 RX ADMIN — VANCOMYCIN HYDROCHLORIDE 1000 MG: 1 INJECTION, SOLUTION INTRAVENOUS at 12:04

## 2020-01-01 RX ADMIN — GABAPENTIN 300 MG: 300 CAPSULE ORAL at 09:30

## 2020-01-01 RX ADMIN — SODIUM CHLORIDE 100 ML/HR: 9 INJECTION, SOLUTION INTRAVENOUS at 13:51

## 2020-01-01 RX ADMIN — IOPAMIDOL 50 ML: 755 INJECTION, SOLUTION INTRAVENOUS at 00:33

## 2020-01-01 RX ADMIN — DIPHENHYDRAMINE HYDROCHLORIDE 25 MG: 50 INJECTION, SOLUTION INTRAMUSCULAR; INTRAVENOUS at 14:27

## 2020-01-01 RX ADMIN — SODIUM CHLORIDE, PRESERVATIVE FREE 10 ML: 5 INJECTION INTRAVENOUS at 22:00

## 2020-01-01 RX ADMIN — TAZOBACTAM SODIUM AND PIPERACILLIN SODIUM 3.38 G: 375; 3 INJECTION, SOLUTION INTRAVENOUS at 22:18

## 2020-01-01 RX ADMIN — INSULIN LISPRO 5 UNITS: 100 INJECTION, SOLUTION INTRAVENOUS; SUBCUTANEOUS at 21:58

## 2020-01-01 RX ADMIN — SODIUM CHLORIDE 1000 ML: 9 INJECTION, SOLUTION INTRAVENOUS at 00:40

## 2020-01-01 RX ADMIN — ONDANSETRON 16 MG: 2 INJECTION INTRAMUSCULAR; INTRAVENOUS at 14:31

## 2020-01-01 RX ADMIN — SODIUM CHLORIDE 1000 ML: 9 INJECTION, SOLUTION INTRAVENOUS at 14:20

## 2020-01-01 RX ADMIN — OXYCODONE HYDROCHLORIDE 10 MG: 5 TABLET ORAL at 18:59

## 2020-01-01 RX ADMIN — POLYETHYLENE GLYCOL 3350 17 G: 17 POWDER, FOR SOLUTION ORAL at 09:30

## 2020-01-01 RX ADMIN — MORPHINE SULFATE 45 MG: 30 TABLET, FILM COATED, EXTENDED RELEASE ORAL at 21:36

## 2020-01-01 RX ADMIN — FAMOTIDINE 20 MG: 10 INJECTION INTRAVENOUS at 14:43

## 2020-01-01 RX ADMIN — APIXABAN 5 MG: 5 TABLET, FILM COATED ORAL at 20:09

## 2020-01-01 RX ADMIN — FAMOTIDINE 20 MG: 10 INJECTION INTRAVENOUS at 14:56

## 2020-01-01 RX ADMIN — GABAPENTIN 300 MG: 300 CAPSULE ORAL at 05:46

## 2020-01-01 RX ADMIN — DEXAMETHASONE SODIUM PHOSPHATE 12 MG: 4 INJECTION, SOLUTION INTRAMUSCULAR; INTRAVENOUS at 14:46

## 2020-01-19 NOTE — PROGRESS NOTES
"Patient Information  Calin Portillo                                                                                          [address] [facility] [id number]      1950  [unfilled]  [unfilled]    Chief Complaint   Patient presents with   • Wound Check     Follow-up to evaluate right pleurx catheter site. Red and swollen at site.        History of Present Illness: Patient seen today for follow-up of a right Pleurx catheter placed in a left subclavian Port-A-Cath for stage IV small cell lung cancer with malignant pleural effusion.  Patient last seen on December 31, 2019 for shortness of breath and markedly decrease drainage from the Pleurx catheter.  We did a chest x-ray that time there was no loculated fluid present and I had him see Dr. Ren for his evaluation..  Patient comes back now with redness around the Pleurx exit site.    Vitals:    01/15/20 1406   BP: 130/70   BP Location: Right arm   Patient Position: Sitting   Pulse: 114   Temp: 97.9 °F (36.6 °C)   TempSrc: Temporal   SpO2: 99%   Weight: 68.3 kg (150 lb 9.6 oz)   Height: 180.3 cm (71\")        Physical Exam there is definitely erythema just around the Pleurx catheter exit site area.  Since there is no further drainage from the Pleurx catheter I thought we should go ahead and removed which was done today in the office.  We infiltrated around the Pleurx exit site with 1% Xylocaine and a small incision was then made over this exit site area and the Pleurx catheter Velcro cuff just under the skin was grasped with the hemostat after dissecting around the soft tissue area from the Velcro cuff and the Pleurx cath was removed in toto.  Pressure was held over the area for proxy 5 minutes and then a sterile dressing was applied.    Lab/other results: Chest x-ray report shows trace remaining right pleural effusion or pleural thickening no new chest disease seen.    Assessment: #1.  Stage IV small cell lung cancer metastatic to liver and spine and pleural " space.  Status post Pleurx catheter placement for malignant pleural effusion now removed due to some irritation around the catheter exit site and no drainage from the catheter for over for over 2 weeks    Plan: As noted above the Pleurx catheter was removed.  Further therapy for the stage IV small cell lung cancer be done by Dr. Ren will see the patient on a as needed basis.    Chema Sandoval M.D.

## 2020-01-21 NOTE — PROGRESS NOTES
DATE OF VISIT: 1/21/2020    REASON FOR VISIT: Followup for extensive stage small cell lung cancer     HISTORY OF PRESENT ILLNESS: The patient is a very pleasant 69 y.o. male  with past medical history significant for extensive stage small cell lung cancer diagnosed October 4, 2019.  He was started on palliative treatment with carbo etoposide and Tecentriq October 9, 2019.  He completed cycle #4 of treatment on 12/9/2019. He was switched to maintenance Tecentriq on 12/31/2019. The patient is here today for scheduled follow up visit with treatment.     SUBJECTIVE: The patient is here today with his wife.  He is complaining of shortness of breath mainly with exertion.  Any chest pain no nausea or vomiting he has been able to tolerate treatment fairly well.    PAST MEDICAL HISTORY/SOCIAL HISTORY/FAMILY HISTORY: Reviewed by me and unchanged from my documentation done on 01/21/20.    Review of Systems   Constitutional: Positive for appetite change, fatigue and unexpected weight change. Negative for activity change, chills and fever.   HENT: Negative for hearing loss, mouth sores, nosebleeds, sore throat and trouble swallowing.    Eyes: Negative for visual disturbance.   Respiratory: Positive for cough and shortness of breath. Negative for chest tightness and wheezing.    Cardiovascular: Negative for chest pain, palpitations and leg swelling.   Gastrointestinal: Positive for nausea. Negative for abdominal distention, abdominal pain, blood in stool, constipation, diarrhea, rectal pain and vomiting.   Endocrine: Negative for cold intolerance and heat intolerance.   Genitourinary: Negative for difficulty urinating, dysuria, frequency and urgency.   Musculoskeletal: Negative for arthralgias, back pain, gait problem, joint swelling and myalgias.   Skin: Negative for rash.   Neurological: Negative for dizziness, tremors, syncope, weakness, light-headedness, numbness and headaches.   Hematological: Negative for adenopathy. Does  not bruise/bleed easily.   Psychiatric/Behavioral: Negative for confusion, sleep disturbance and suicidal ideas. The patient is nervous/anxious.          Current Outpatient Medications:   •  apixaban (ELIQUIS) 5 MG tablet tablet, Take 1 tablet by mouth 2 (Two) Times a Day., Disp: 60 tablet, Rfl: 5  •  aspirin 81 MG EC tablet, Take 1 tablet by mouth Daily., Disp: , Rfl:   •  atorvastatin (LIPITOR) 80 MG tablet, Take 1 tablet by mouth Every Night., Disp: 90 tablet, Rfl: 3  •  Empagliflozin-Linagliptin (GLYXAMBI) 25-5 MG tablet, Take 0.5 tablets by mouth Every Morning., Disp: 28 tablet, Rfl: 0  •  fluticasone (FLONASE) 50 MCG/ACT nasal spray, 1 spray into the nostril(s) as directed by provider 2 (Two) Times a Day., Disp: 3 bottle, Rfl: 3  •  Fluticasone-Umeclidin-Vilant (TRELEGY ELLIPTA) 100-62.5-25 MCG/INH aerosol powder , Inhale 1 each Every Morning., Disp: 28 each, Rfl: 0  •  glimepiride (AMARYL) 2 MG tablet, Take 1 tablet by mouth Every Morning Before Breakfast., Disp: 90 tablet, Rfl: 3  •  lidocaine-prilocaine (EMLA) 2.5-2.5 % cream, Apply  topically to the appropriate area as directed As Needed (45-60 minutes prior to port access.  Cover with saran/plastic wrap.)., Disp: 30 g, Rfl: 3  •  LORazepam (ATIVAN) 1 MG tablet, Take 1 tablet by mouth Every 8 (Eight) Hours As Needed for Anxiety., Disp: 90 tablet, Rfl: 2  •  megestrol (MEGACE) 40 MG/ML suspension, Take 20 mL by mouth Daily., Disp: 480 mL, Rfl: 1  •  metFORMIN ER (GLUCOPHAGE-XR) 500 MG 24 hr tablet, Take 2 tablets by mouth 2 (Two) Times a Day., Disp: 360 tablet, Rfl: 3  •  mirtazapine (REMERON) 15 MG tablet, Take 1 tablet by mouth Every Night. For appetite, Disp: 90 tablet, Rfl: 3  •  Morphine (MS CONTIN) 15 MG 12 hr tablet, Take 3 tablets by mouth 2 (Two) Times a Day., Disp: 180 tablet, Rfl: 0  •  ondansetron (ZOFRAN) 8 MG tablet, Take 1 tablet by mouth Every 8 (Eight) Hours As Needed for Nausea for up to 60 doses., Disp: 30 tablet, Rfl: 5  •  oxyCODONE  "(ROXICODONE) 10 MG tablet, Take 1 tablet by mouth 4 (Four) Times a Day Before Meals & at Bedtime., Disp: 120 tablet, Rfl: 0  •  zolpidem (AMBIEN) 10 MG tablet, Take 1 tablet by mouth At Night As Needed for Sleep., Disp: 30 tablet, Rfl: 2    PHYSICAL EXAMINATION:   /86   Pulse 55   Temp 98.8 °F (37.1 °C) (Oral)   Resp 14   Ht 180.3 cm (70.98\")   Wt 67.6 kg (149 lb)   SpO2 (!) 80%   BMI 20.79 kg/m²    ECOG Performance Status: 1 - Symptomatic but completely ambulatory  General Appearance:  alert, cooperative, anxious   Neurologic/Psychiatric: A&O x 3, gait steady, appropriate affect, strength 5/5 in all muscle groups   HEENT:  Normocephalic, without obvious abnormality, mucous membranes moist   Neck: Supple, symmetrical, trachea midline, no adenopathy;  No thyromegaly, masses, or tenderness   Lungs:   Short of breathClear to auscultation bilaterally; respirations regular, even, and unlabored bilaterally   Heart:  Regular rate and rhythm, no murmurs appreciated   Abdomen:   Soft, non-tender, non-distended and no organomegaly   Lymph nodes: No cervical, supraclavicular, inguinal or axillary adenopathy noted   Extremities: Normal, atraumatic; no clubbing, cyanosis, or edema    Skin: No rashes, ulcers, or suspicious lesions noted     No visits with results within 2 Week(s) from this visit.   Latest known visit with results is:   Hospital Outpatient Visit on 01/02/2020   Component Date Value Ref Range Status   • Product Code 01/03/2020 X0680N60   Final   • Unit Number 01/03/2020 [id number]   Final   • UNIT  ABO 01/03/2020 O   Final   • UNIT  RH 01/03/2020 POS   Final   • Dispense Status 01/03/2020 PT   Final   • Blood Type 01/03/2020 OPOS   Final   • Blood Expiration Date 01/03/2020 202001292359   Final   • Blood Type Barcode 01/03/2020 5100   Final   • Product Code 01/03/2020 L1100O91   Final   • Unit Number 01/03/2020 [id number]   Final   • UNIT  ABO 01/03/2020 O   Final   • UNIT  RH 01/03/2020 POS  "  Final   • Dispense Status 01/03/2020 PT   Final   • Blood Type 01/03/2020 OPOS   Final   • Blood Expiration Date 01/03/2020 202001292359   Final   • Blood Type Barcode 01/03/2020 5100   Final        Xr Chest 2 View    Result Date: 1/16/2020  Narrative: EXAMINATION: XR CHEST 2 VIEWS - 01/15/2020  INDICATION: T95-[place] effusion, not elsewhere classified.  COMPARISON: 12/31/2019  FINDINGS: Pleurx-type catheter remains in the right lung base with only mild pleural thickening or trace pleural fluid, decreased from prior study. Lungs elsewhere appear clear except for granulomatous calcifications. No pneumothorax is seen. The heart and vasculature appear normal in size.      Impression: Trace remaining right pleural effusion or pleural thickening, decreased from 12/31/2019. No new chest disease is seen.  DICTATED:   01/16/2020 EDITED/ls :   01/16/2020    This report was finalized on 1/16/2020 10:35 PM by Dr. Guru Werner MD.      Xr Chest 2 View    Result Date: 12/31/2019  Narrative: EXAMINATION: XR CHEST 2 VW-12/31/2019:  INDICATION: RIGHT PLEURAL EFFUSION; Z28-[place] effusion, not elsewhere classified.  COMPARISON: 11/15/2019.  FINDINGS: Two-view chest reveals Pleurx catheter identified on the right. There is a small right pleural effusion identified. No pneumothorax. Port-A-Catheter identified on the left. No pneumothorax. There is stable calcified granuloma identified in the left mid lung. Degenerative changes seen within the spine. Fluid identified along the right fissure. No focal parenchymal opacification present. No changes seen in the left lung in the interval. Vascular calcification seen within the thoracic aorta.         Impression: Pleurx catheter identified on the right with small amount of fluid seen along the right fissure and tiny right pleural fluid collection identified. No pneumothorax.  D:  12/31/2019 E:  12/31/2019  This report was finalized on 12/31/2019 2:39 PM by Dr. Olivia Pandya MD.         ASSESSMENT: The patient is a very pleasant 69 y.o. male  with extensive stage small cell lung cancer  Problem list:  1.  Extensive stage small cell lung cancer:  A.  Presented with abdominal pain and shortness of breath  B.  CT chest abdomen pelvis revealed right upper lobe lung mass with multiple pleural-based masses rectal effusion and diffuse liver metastases  C.  Status post bronchoscopy with biopsy done on October 4 2019 that revealed small cell lung cancer  D.  Started carboplatin and etoposide cycle #1 October 9, 2019, status post 5 cycles  E. Switched to maintenance Tecentriq on 12/31/2019.   2.  Right pleural effusion:  A.  Status post thoracentesis done on October 6, 2019  B.  Status post Pleurx catheter placement done by Dr. Sky October 10, 2019  3.    History of hepatitis C:  A.  Status post treatment with interferon.  B.  Hepatitis C antibodies positive but viral load quantitative serology negative  4.  Cancer related pain  5.  Constipation  6.  Chemotherapy induced nausea  7.  Type 2 diabetes  8.  Left MCA embolic stroke  9.  Shortness of breath    PLAN:  1.  We will proceed with treatment today using Tecentriq maintenance, cycle #6 of treatment.   2.  The patient follow-up with us in 3 weeks for cycle #7.  3. The patient will need repeat CAT scans in 3 months which would be due March 2020.  4.  We will continue to monitor the patient's blood work including blood counts, kidney function, liver function, and electrolytes.  5.  We will continue to drain right pleural effusion through Pleurx catheter.  Recently the patient has had no fluid output.  He was seen by Dr. Sandoval this morning who did a chest x-ray to verify placement as well as confirmed that there is no visible residual pleural effusion at this time.  I reassured the patient that this is an expected finding as he responds to treatment.    6.  We will continue Zofran as needed for chemotherapy induced nausea.  I encouraged the patient to  use this more often as he is still having treatment induced nausea that is impairing his ability to eat.  7.  We will continue port care with use of EMLA cream prior to port access.  8. We discussed potential side effects of immunotherapy including but not limited to immune mediated reactions with thyroiditis, pneumonitis, hepatitis, colitis, rash, and electrolytes abnormalities, fatigue, multiorgan failure, and possibly death.  9.  We will continue extended release morphine as well as oxycodone short acting for cancer related pain.  This has been prescribed by the palliative care clinic.  10.  We will continue bowel regimen for opiate-induced constipation.  11.  We will continue metformin for type 2 diabetes.  12. We will continue Eliquis 5 mg twice daily.  I encouraged him to be compliant with dosing twice a day.  13.  We will add Ativan 1 mg 3 times a day as needed for anxiety and sleep disturbance.  He was given a new prescription for this today.  14.  I encouraged the patient to wear his supplemental oxygen when he has feelings of shortness of breath.  I did reassure him that his oxygen saturation is 99% today on room air.  15.  Regarding his shortness of breath this is more anxiety I asked him to continue seeing his Ativan as needed and try to take nice and deep breath when this happens.   16.  We will continue Megace daily for poor appetite and weight loss.      Krishna Rne MD  1/21/2020   1:14 PM

## 2020-02-12 NOTE — OUTREACH NOTE
Stroke Serina Survey      Responses   Facility patient discharged from?  [facility]   Attempt successful  No   Unsuccessful attempts  Attempt 1          Haley Jasso RN

## 2020-02-14 NOTE — PROGRESS NOTES
DATE OF VISIT: 2/14/2020    REASON FOR VISIT: Followup for extensive stage small cell lung cancer     HISTORY OF PRESENT ILLNESS: The patient is a very pleasant 69 y.o. male  with past medical history significant for extensive stage small cell lung cancer diagnosed October 4, 2019.  He was started on palliative treatment with carbo etoposide and Tecentriq October 9, 2019.  He completed cycle #4 of treatment on 12/9/2019. He was switched to maintenance Tecentriq on 12/31/2019. The patient is here today for scheduled follow up visit with treatment.     SUBJECTIVE: The patient is here today with his wife. He has been doing fair. He complains of difficulty sleeping at night with subsequent daytime fatigue. He has tried Ambien, but does not feel like it helps. He feels like he can't turn off his thoughts in order to sleep. He also has issues with constipation. He is taking Milk of Magnesia when needed, but no regular bowel regimen. He has to strain in order to have a bowel movement which hurts. He had his Pleurx tube removed by Dr. Sandoval on 2/11/2020. He still has some pain in his ribs following removal. He denies shortness of breath or worsening cough. He is eating and drinking well. He has increased thirst and urination. He is not checking his blood sugars at home.     PAST MEDICAL HISTORY/SOCIAL HISTORY/FAMILY HISTORY: Reviewed by me and unchanged from my documentation done on 02/14/20.    Review of Systems   Constitutional: Positive for fatigue and unexpected weight change. Negative for activity change, appetite change, chills and fever.   HENT: Negative for hearing loss, mouth sores, nosebleeds, sore throat and trouble swallowing.    Eyes: Negative for visual disturbance.   Respiratory: Positive for shortness of breath. Negative for cough, chest tightness and wheezing.    Cardiovascular: Positive for chest pain. Negative for palpitations and leg swelling.   Gastrointestinal: Positive for constipation. Negative for  abdominal distention, abdominal pain, blood in stool, diarrhea, nausea, rectal pain and vomiting.   Endocrine: Positive for polydipsia and polyuria. Negative for cold intolerance and heat intolerance.   Genitourinary: Negative for difficulty urinating, dysuria, frequency and urgency.   Musculoskeletal: Negative for arthralgias, back pain, gait problem, joint swelling and myalgias.   Skin: Negative for rash.   Neurological: Negative for dizziness, tremors, syncope, weakness, light-headedness, numbness and headaches.   Hematological: Negative for adenopathy. Does not bruise/bleed easily.   Psychiatric/Behavioral: Positive for sleep disturbance. Negative for confusion and suicidal ideas. The patient is nervous/anxious.          Current Outpatient Medications:   •  apixaban (ELIQUIS) 5 MG tablet tablet, Take 1 tablet by mouth 2 (Two) Times a Day., Disp: 60 tablet, Rfl: 5  •  aspirin 81 MG EC tablet, Take 1 tablet by mouth Daily., Disp: , Rfl:   •  atorvastatin (LIPITOR) 80 MG tablet, Take 1 tablet by mouth Every Night., Disp: 90 tablet, Rfl: 3  •  doxycycline (VIBRAMYCIN) 100 MG capsule, Take 1 capsule by mouth 2 (Two) Times a Day., Disp: 20 capsule, Rfl: 0  •  Empagliflozin-Linagliptin (GLYXAMBI) 25-5 MG tablet, Take 0.5 tablets by mouth Every Morning., Disp: 28 tablet, Rfl: 0  •  fluticasone (FLONASE) 50 MCG/ACT nasal spray, 1 spray into the nostril(s) as directed by provider 2 (Two) Times a Day., Disp: 3 bottle, Rfl: 3  •  Fluticasone-Umeclidin-Vilant (TRELEGY ELLIPTA) 100-62.5-25 MCG/INH aerosol powder , Inhale 1 each Every Morning., Disp: 28 each, Rfl: 0  •  glimepiride (AMARYL) 2 MG tablet, Take 1 tablet by mouth Every Morning Before Breakfast., Disp: 90 tablet, Rfl: 3  •  lidocaine-prilocaine (EMLA) 2.5-2.5 % cream, Apply  topically to the appropriate area as directed As Needed (45-60 minutes prior to port access.  Cover with saran/plastic wrap.)., Disp: 30 g, Rfl: 3  •  LORazepam (ATIVAN) 1 MG tablet, Take 1  "tablet by mouth Every 8 (Eight) Hours As Needed for Anxiety., Disp: 90 tablet, Rfl: 2  •  megestrol (MEGACE) 40 MG/ML suspension, Take 20 mL by mouth Daily., Disp: 480 mL, Rfl: 1  •  metFORMIN ER (GLUCOPHAGE-XR) 500 MG 24 hr tablet, Take 2 tablets by mouth 2 (Two) Times a Day., Disp: 360 tablet, Rfl: 3  •  mirtazapine (REMERON) 15 MG tablet, Take 1 tablet by mouth Every Night. For appetite, Disp: 90 tablet, Rfl: 3  •  Morphine (MS CONTIN) 15 MG 12 hr tablet, Take 3 tablets by mouth 2 (Two) Times a Day., Disp: 180 tablet, Rfl: 0  •  ondansetron (ZOFRAN) 8 MG tablet, Take 1 tablet by mouth Every 8 (Eight) Hours As Needed for Nausea for up to 60 doses., Disp: 30 tablet, Rfl: 5  •  oxyCODONE (ROXICODONE) 10 MG tablet, Take 1 tablet by mouth 4 (Four) Times a Day Before Meals & at Bedtime., Disp: 120 tablet, Rfl: 0  •  zolpidem (AMBIEN) 10 MG tablet, Take 1 tablet by mouth At Night As Needed for Sleep., Disp: 30 tablet, Rfl: 2    PHYSICAL EXAMINATION:   /91   Pulse 61   Temp 97.7 °F (36.5 °C) (Temporal)   Resp 16   Ht 180.3 cm (70.98\")   Wt 68.9 kg (152 lb)   SpO2 98%   BMI 21.21 kg/m²    ECOG Performance Status: 1 - Symptomatic but completely ambulatory  General Appearance:  alert, cooperative, anxious   Neurologic/Psychiatric: A&O x 3, gait steady, appropriate affect, strength 5/5 in all muscle groups   HEENT:  Normocephalic, without obvious abnormality, mucous membranes moist   Neck: Supple, symmetrical, trachea midline, no adenopathy;  No thyromegaly, masses, or tenderness   Lungs:   Short of breathClear to auscultation bilaterally; respirations regular, even, and unlabored bilaterally   Heart:  Regular rate and rhythm, no murmurs appreciated   Abdomen:   Soft, non-tender, non-distended and no organomegaly   Lymph nodes: No cervical, supraclavicular, inguinal or axillary adenopathy noted   Extremities: Normal, atraumatic; no clubbing, cyanosis, or edema    Skin: No rashes, ulcers, or suspicious lesions " noted     No visits with results within 2 Week(s) from this visit.   Latest known visit with results is:   Hospital Outpatient Visit on 01/21/2020   Component Date Value Ref Range Status   • Glucose 01/21/2020 302* 65 - 99 mg/dL Final   • BUN 01/21/2020 17  8 - 23 mg/dL Final   • Creatinine 01/21/2020 0.72* 0.76 - 1.27 mg/dL Final   • Sodium 01/21/2020 136  136 - 145 mmol/L Final   • Potassium 01/21/2020 3.9  3.5 - 5.2 mmol/L Final   • Chloride 01/21/2020 100  98 - 107 mmol/L Final   • CO2 01/21/2020 23.0  22.0 - 29.0 mmol/L Final   • Calcium 01/21/2020 9.2  8.6 - 10.5 mg/dL Final   • Total Protein 01/21/2020 8.1  6.0 - 8.5 g/dL Final   • Albumin 01/21/2020 3.60  3.50 - 5.20 g/dL Final   • ALT (SGPT) 01/21/2020 13  1 - 41 U/L Final   • AST (SGOT) 01/21/2020 17  1 - 40 U/L Final   • Alkaline Phosphatase 01/21/2020 101  39 - 117 U/L Final   • Total Bilirubin 01/21/2020 0.3  0.2 - 1.2 mg/dL Final   • eGFR Non African Amer 01/21/2020 109  >60 mL/min/1.73 Final   • Globulin 01/21/2020 4.5  gm/dL Final   • A/G Ratio 01/21/2020 0.8  g/dL Final   • BUN/Creatinine Ratio 01/21/2020 23.6  7.0 - 25.0 Final   • Anion Gap 01/21/2020 13.0  5.0 - 15.0 mmol/L Final   • WBC 01/21/2020 10.30  3.40 - 10.80 10*3/mm3 Final   • RBC 01/21/2020 3.61* 4.14 - 5.80 10*6/mm3 Final   • Hemoglobin 01/21/2020 11.3* 13.0 - 17.7 g/dL Final   • Hematocrit 01/21/2020 35.0* 37.5 - 51.0 % Final   • RDW 01/21/2020 20.0* 12.3 - 15.4 % Final   • MCV 01/21/2020 97.1* 79.0 - 97.0 fL Final   • MCH 01/21/2020 31.3  26.6 - 33.0 pg Final   • MCHC 01/21/2020 32.3  31.5 - 35.7 g/dL Final   • MPV 01/21/2020 8.1  6.0 - 12.0 fL Final   • Platelets 01/21/2020 269  140 - 450 10*3/mm3 Final   • Neutrophil % 01/21/2020 77.8* 42.7 - 76.0 % Final   • Lymphocyte % 01/21/2020 18.6* 19.6 - 45.3 % Final   • Monocyte % 01/21/2020 3.6* 5.0 - 12.0 % Final   • Neutrophils, Absolute 01/21/2020 8.00* 1.70 - 7.00 10*3/mm3 Final   • Lymphocytes, Absolute 01/21/2020 1.90  0.70 - 3.10  10*3/mm3 Final   • Monocytes, Absolute 01/21/2020 0.40  0.10 - 0.90 10*3/mm3 Final        Xr Chest 2 View    Result Date: 1/16/2020  Narrative: EXAMINATION: XR CHEST 2 VIEWS - 01/15/2020  INDICATION: U64-[place]ekcr[place] effusion, not elsewhere classified.  COMPARISON: 12/31/2019  FINDINGS: Pleurx-type catheter remains in the right lung base with only mild pleural thickening or trace pleural fluid, decreased from prior study. Lungs elsewhere appear clear except for granulomatous calcifications. No pneumothorax is seen. The heart and vasculature appear normal in size.      Impression: Trace remaining right pleural effusion or pleural thickening, decreased from 12/31/2019. No new chest disease is seen.  DICTATED:   01/16/2020 EDITED/ls :   01/16/2020    This report was finalized on 1/16/2020 10:35 PM by Dr. Guru Werner MD.        ASSESSMENT: The patient is a very pleasant 69 y.o. male  with extensive stage small cell lung cancer  Problem list:  1.  Extensive stage small cell lung cancer:  A.  Presented with abdominal pain and shortness of breath  B.  CT chest abdomen pelvis revealed right upper lobe lung mass with multiple pleural-based masses rectal effusion and diffuse liver metastases  C.  Status post bronchoscopy with biopsy done on October 4 2019 that revealed small cell lung cancer  D.  Started carboplatin and etoposide cycle #1 October 9, 2019, status post 5 cycles  E. Switched to maintenance Tecentriq on 12/31/2019.   2.  Right pleural effusion:  A.  Status post thoracentesis done on October 6, 2019  B.  Status post Pleurx catheter placement done by Dr. Sky October 10, 2019  3.    History of hepatitis C:  A.  Status post treatment with interferon.  B.  Hepatitis C antibodies positive but viral load quantitative serology negative  4.  Cancer related pain  5.  Constipation  6.  Chemotherapy induced nausea  7.  Type 2 diabetes  8.  Left MCA embolic stroke  9.  Shortness of breath    PLAN:  1.  We will proceed with  treatment today using Tecentriq maintenance, cycle #7 of treatment.   2.  The patient follow-up with us in 3 weeks for cycle #8.  3. The patient will need repeat CAT scans in 3 months which would be due March 2020, and ordered for prior to return.  4.  We will continue to monitor the patient's blood work including blood counts, kidney function, liver function, and electrolytes.  5.  His Pleurx catheter has been removed secondary to local irritation as well as no output for over two weeks. We will consider repeat thoracentesis in the future if needed for recurrent pleural effusion.   6.  We will continue Zofran as needed for chemotherapy induced nausea.    7.  We will continue port care with use of EMLA cream prior to port access.  8. We discussed potential side effects of immunotherapy including but not limited to immune mediated reactions with thyroiditis, pneumonitis, hepatitis, colitis, rash, and electrolytes abnormalities, fatigue, multiorgan failure, and possibly death.  9.  We will continue extended release morphine as well as oxycodone short acting for cancer related pain.  This has been prescribed by the palliative care clinic.  10. I encouraged the patient to start on daily bowel regimen of Miralax 1-2 times per day, stool softeners twice a day, and Sennakot 2 tabs at bedtime. I told him to titrate this based upon bowel function, and add Milk of Magnesia if needed for constipation. We will consider adding lactulose if needed.   11.  We will continue metformin for type 2 diabetes.  12. We will continue Eliquis 5 mg twice daily.  I encouraged him to be compliant with dosing twice a day.  13.  We will continue Ativan 1 mg 3 times a day as needed for anxiety and sleep disturbance. I recommended he take this 30 minutes before bedtime to see if this will help with sleep.   14.  I encouraged the patient to wear his supplemental oxygen when he has feelings of shortness of breath.    15. I encouraged the patient to  monitor his blood sugars at home. He has had hyperglycemia with lab draws for treatment. He needs to follow up with his PCP, Dr. Alcantara, regarding management of diabetes with worsening hyperglycemia and symptoms including polydipsia and polyuria.   16. We will continue Megace daily for poor appetite and weight loss.      Sasha Callejas, APRN  2/14/2020   1:25 PM

## 2020-02-24 PROBLEM — R65.10 SIRS (SYSTEMIC INFLAMMATORY RESPONSE SYNDROME) (HCC): Status: ACTIVE | Noted: 2020-01-01

## 2020-02-26 PROBLEM — R52 INTRACTABLE PAIN: Status: RESOLVED | Noted: 2019-01-01 | Resolved: 2020-01-01

## 2020-02-26 PROBLEM — R65.10 SIRS (SYSTEMIC INFLAMMATORY RESPONSE SYNDROME) (HCC): Status: RESOLVED | Noted: 2020-01-01 | Resolved: 2020-01-01

## 2020-02-26 NOTE — OUTREACH NOTE
"Stroke Serina Survey      Responses   Facility patient discharged from?  [facility]   Attempt successful  Yes   Call start time  1129   Person spoke with today (if not patient) and relationship  Patient   Did the patient die within 30 days of admission?  No   Did the patient die within 30 days of discharge?  No   Call end time  1137   Patient location 30 days post discharge if known  Home   Was the patient readmitted within 30 days of discharge?  No   Could you live alone without any help from another person?  Yes   Can you do everything that you were doing right before your stroke even if slower and not as much?  Yes   Are you completely back to the way you were right before your stroke?  Yes   Can you walk from one room to another without help from another person?  Yes   Can the patient sit up in bed without any help?  Yes   Call Center [place] Score  0   [place] score call completed  Yes   Comments  Patient report no residual stroke symptoms.  He is very appreciative of everything Dr. Mendez did for him. He states \"You have a wonderful hospital and I had so much confidence in the doctor while I was lying in the scan at 5:30 in the morning.\"          Haley Jasso RN  "

## 2020-02-27 NOTE — OUTREACH NOTE
Prep Survey      Responses   Facility patient discharged from?  [facility]   Is patient eligible?  Yes   Discharge diagnosis  SIRS,  lung mass,  T2Dm   Does the patient have one of the following disease processes/diagnoses(primary or secondary)?  Other   Does the patient have Home health ordered?  No   Is there a DME ordered?  No   Comments regarding appointments  see AVS   Prep survey completed?  Yes          Corry Ochoa RN

## 2020-02-27 NOTE — TELEPHONE ENCOUNTER
PT CALLED STATING THAT HE WOULD LIKE TO TALK PIPER CONCERNING MEDICAL RECORDS. ALSO WANTED TO ASK QUESTIONS ABOUT THE CHANGES IN THE OFFICE.         PLEASE ADVISE PT @ [phone]

## 2020-02-28 NOTE — OUTREACH NOTE
Medical Week 1 Survey      Responses   Facility patient discharged from?  [facility]   Does the patient have one of the following disease processes/diagnoses(primary or secondary)?  Other   Is there a successful TCM telephone encounter documented?  No   Week 1 attempt successful?  No   Unsuccessful attempts  Attempt 1          Angela Jeronimo RN

## 2020-03-02 NOTE — PLAN OF CARE
Outpatient Infusion • [address] • [address] • [place] [id number] • [phone]      CHEMOTHERAPY EDUCATION SHEET    NAME:  Calin Portillo      : 1950           DATE: 20    Booklets Given: Chemotherapy and You []  Eating Hints []    Sexuality/Fertility Books []     Chemotherapy/Biotherapy Education Sheets: Paclitaxel                                                                                                                                                                Chemotherapy Regimen:  Paclitaxel weekly on days 1,8,15 of 28 day cycle.    TOPICS EDUCATION PROVIDED EDUCATION REINFORCED COMMENTS   ANEMIA:  role of RBC, cause, s/s, ways to manage, role of transfusion [x] [] Patient educated on lab monitoring and possible treatments   THROMBOCYTOPENIA:  role of platelet, cause, s/s, ways to prevent bleeding, things to avoid, when to seek help [x] [] Patient educated on lab monitoring and possible treatments   NEUTROPENIA:  role of WBC, cause, infection precautions, s/s of infection, when to call MD [x] [] Patient educated on lab monitoring and possible treatments. Discussed to call provider with fever > 100.4   NUTRITION & APPETITE CHANGES:  importance of maintaining healthy diet & weight, ways to manage to improve intake, dietary consult, exercise regimen [] [x] Focused on maintaining consistent intake   DIARRHEA:  causes, s/s of dehydration, ways to manage, dietary changes, when to call MD [x] [] Patient had been experiencing constipation lately. Discussed using loperamide OTC should diarrhea occur.   CONSTIPATION:  causes, ways to manage, dietary changes, when to call MD [x] [] Patient is on a bowel regimen prior to initiation   NAUSEA & VOMITING:  cause, use of antiemetics, dietary changes, when to call MD [x] [] Advised when to use ondansetron   MOUTH SORES:  causes, oral care, ways to manage [x] [] Explained salt and soda rinse, magic mouthwash available upon request    ALOPECIA:  cause, ways to manage, resources [x] [] Explained that hair will grow again with treatment completion   INFERTILITY & SEXUALITY:  causes, fertility preservation options, sexuality changes, ways to manage, importance of birth control [] [x] Reinforced two forms of birth control   NERVOUS SYSTEM CHANGES:  causes, s/s, neuropathies, cognitive changes, ways to manage [x] [] Explained neuropathy development and when to call physician   PAIN:  causes, ways to manage [x] [] Explained timing of arthralgia and myalgia, how to manage????   SKIN & NAIL CHANGES:  cause, s/s, ways to manage [x] [] Mentioned darkening of skin   ORGAN TOXICITIES:  cause, s/s, need for diagnostic tests, labs, when to notify MD [x] [] Explained need for routine lab monitoring   SURVIVORSHIP:  distress, distress assessment, secondary malignancies, early/late effects, follow-up, social issues, social support [] [x] Reinforced from previous treatment   HOME CARE:  use of spill kits, storing of PO chemo, how to manage bodily fluids [] [x] Reviewed recommended practices for bodily fluids    MISCELLANEOUS:  drug interactions, administration, vesicant, et [x] [] Discussed schedule of regimen with weekly treatment x3 followed by 1 week off. No pertinent drug-drug interactions noted.     Referrals:    Notes: Discussed aforementioned material with patient prior to infusion. Obtained consent for treatment. All patient questions answered.     Migel De La Torre PharmD  Pharmacy Resident   3/2/2020  3:32 PM

## 2020-03-02 NOTE — OUTREACH NOTE
Medical Week 1 Survey      Responses   Facility patient discharged from?  [facility]   Does the patient have one of the following disease processes/diagnoses(primary or secondary)?  Other   Is there a successful TCM telephone encounter documented?  No   Week 1 attempt successful?  No   Unsuccessful attempts  Attempt 2          Allison Lindquist RN

## 2020-03-02 NOTE — PROGRESS NOTES
DATE OF VISIT: 3/2/2020    REASON FOR VISIT: Followup for extensive stage small cell lung cancer     HISTORY OF PRESENT ILLNESS: The patient is a very pleasant 69 y.o. male  with past medical history significant for extensive stage small cell lung cancer diagnosed October 4, 2019.  He was started on palliative treatment with carbo etoposide and Tecentriq October 9, 2019.  He completed cycle #4 of treatment on 12/9/2019. He was switched to maintenance Tecentriq on 12/31/2019.  Repeat scans done February 24, 2020 revealed progressive disease lung liver and bones.  Treatment switched to Taxol weekly started on March 2, 2020.  The patient is here today for scheduled follow up visit with treatment, cycle 1 day 1.     SUBJECTIVE: The patient is here today with his wife.  His pain is better.  He is anxious about his recent scan findings.  He is having shortness of breath with exertion.  Complains of mild fatigue.    PAST MEDICAL HISTORY/SOCIAL HISTORY/FAMILY HISTORY: Reviewed by me and unchanged from my documentation done on 03/02/20.    Review of Systems   Constitutional: Positive for fatigue and unexpected weight change. Negative for activity change, appetite change, chills and fever.   HENT: Negative for hearing loss, mouth sores, nosebleeds, sore throat and trouble swallowing.    Eyes: Negative for visual disturbance.   Respiratory: Positive for shortness of breath. Negative for cough, chest tightness and wheezing.    Cardiovascular: Positive for chest pain. Negative for palpitations and leg swelling.   Gastrointestinal: Positive for constipation. Negative for abdominal distention, abdominal pain, blood in stool, diarrhea, nausea, rectal pain and vomiting.   Endocrine: Positive for polydipsia and polyuria. Negative for cold intolerance and heat intolerance.   Genitourinary: Negative for difficulty urinating, dysuria, frequency and urgency.   Musculoskeletal: Negative for arthralgias, back pain, gait problem, joint  swelling and myalgias.   Skin: Negative for rash.   Neurological: Negative for dizziness, tremors, syncope, weakness, light-headedness, numbness and headaches.   Hematological: Negative for adenopathy. Does not bruise/bleed easily.   Psychiatric/Behavioral: Positive for sleep disturbance. Negative for confusion and suicidal ideas. The patient is nervous/anxious.          Current Outpatient Medications:   •  apixaban (ELIQUIS) 5 MG tablet tablet, Take 1 tablet by mouth 2 (Two) Times a Day., Disp: 60 tablet, Rfl: 5  •  aspirin 81 MG EC tablet, Take 1 tablet by mouth Daily., Disp: , Rfl:   •  atorvastatin (LIPITOR) 80 MG tablet, Take 1 tablet by mouth Every Night., Disp: 90 tablet, Rfl: 3  •  docusate sodium (COLACE) 100 MG capsule, Take 2 capsules by mouth 2 (Two) Times a Day As Needed for Constipation., Disp: 120 capsule, Rfl: 3  •  fluticasone (FLONASE) 50 MCG/ACT nasal spray, 1 spray into the nostril(s) as directed by provider 2 (Two) Times a Day., Disp: 3 bottle, Rfl: 3  •  Fluticasone-Umeclidin-Vilant (TRELEGY ELLIPTA) 100-62.5-25 MCG/INH aerosol powder , Inhale 1 each Every Morning., Disp: 28 each, Rfl: 0  •  gabapentin (NEURONTIN) 300 MG capsule, Take 300 mg by mouth 3 (Three) Times a Day., Disp: , Rfl:   •  lactobacillus acidophilus (RISAQUAD) capsule capsule, Take 1 capsule by mouth Daily for 5 days., Disp: 5 capsule, Rfl: 0  •  lidocaine-prilocaine (EMLA) 2.5-2.5 % cream, Apply  topically to the appropriate area as directed As Needed (45-60 minutes prior to port access.  Cover with saran/plastic wrap.)., Disp: 30 g, Rfl: 3  •  LORazepam (ATIVAN) 1 MG tablet, Take 1 tablet by mouth Every 8 (Eight) Hours As Needed for Anxiety., Disp: 90 tablet, Rfl: 2  •  metFORMIN ER (GLUCOPHAGE-XR) 500 MG 24 hr tablet, Take 2 tablets by mouth 2 (Two) Times a Day., Disp: 360 tablet, Rfl: 3  •  mirtazapine (REMERON) 15 MG tablet, Take 1 tablet by mouth Every Night. For appetite, Disp: 90 tablet, Rfl: 3  •  Morphine (MS CONTIN)  "15 MG 12 hr tablet, Take 3 tablets by mouth 2 (Two) Times a Day., Disp: 180 tablet, Rfl: 0  •  ondansetron (ZOFRAN) 8 MG tablet, Take 1 tablet by mouth Every 8 (Eight) Hours As Needed for Nausea for up to 60 doses., Disp: 30 tablet, Rfl: 5  •  oxyCODONE (ROXICODONE) 10 MG tablet, Take 1 tablet by mouth 4 (Four) Times a Day Before Meals & at Bedtime., Disp: 120 tablet, Rfl: 0  •  zolpidem (AMBIEN) 10 MG tablet, Take 1 tablet by mouth At Night As Needed for Sleep., Disp: 30 tablet, Rfl: 2    PHYSICAL EXAMINATION:   /71   Pulse 114   Temp 98.6 °F (37 °C) (Temporal)   Resp 20   Ht 180.3 cm (70.98\")   Wt 74.4 kg (164 lb)   SpO2 93%   BMI 22.88 kg/m²    ECOG Performance Status: 1 - Symptomatic but completely ambulatory  General Appearance:  alert, cooperative, anxious   Neurologic/Psychiatric: A&O x 3, gait steady, appropriate affect, strength 5/5 in all muscle groups   HEENT:  Normocephalic, without obvious abnormality, mucous membranes moist   Neck: Supple, symmetrical, trachea midline, no adenopathy;  No thyromegaly, masses, or tenderness   Lungs:   Short of breathClear to auscultation bilaterally; respirations regular, even, and unlabored bilaterally   Heart:  Regular rate and rhythm, no murmurs appreciated   Abdomen:   Soft, non-tender, non-distended and no organomegaly   Lymph nodes: No cervical, supraclavicular, inguinal or axillary adenopathy noted   Extremities: Normal, atraumatic; no clubbing, cyanosis, or edema    Skin: No rashes, ulcers, or suspicious lesions noted     Admission on 02/24/2020, Discharged on 02/26/2020   Component Date Value Ref Range Status   • Troponin T 02/24/2020 <0.010  0.000 - 0.030 ng/mL Final   • Glucose 02/24/2020 305* 65 - 99 mg/dL Final   • BUN 02/24/2020 15  8 - 23 mg/dL Final   • Creatinine 02/24/2020 0.77  0.76 - 1.27 mg/dL Final   • Sodium 02/24/2020 132* 136 - 145 mmol/L Final   • Potassium 02/24/2020 3.5  3.5 - 5.2 mmol/L Final   • Chloride 02/24/2020 95* 98 - 107 " mmol/L Final   • CO2 02/24/2020 23.0  22.0 - 29.0 mmol/L Final   • Calcium 02/24/2020 9.0  8.6 - 10.5 mg/dL Final   • Total Protein 02/24/2020 7.7  6.0 - 8.5 g/dL Final   • Albumin 02/24/2020 3.70  3.50 - 5.20 g/dL Final   • ALT (SGPT) 02/24/2020 46* 1 - 41 U/L Final   • AST (SGOT) 02/24/2020 47* 1 - 40 U/L Final   • Alkaline Phosphatase 02/24/2020 154* 39 - 117 U/L Final   • Total Bilirubin 02/24/2020 0.3  0.2 - 1.2 mg/dL Final   • eGFR Non African Amer 02/24/2020 100  >60 mL/min/1.73 Final   • Globulin 02/24/2020 4.0  gm/dL Final   • A/G Ratio 02/24/2020 0.9  g/dL Final   • BUN/Creatinine Ratio 02/24/2020 19.5  7.0 - 25.0 Final   • Anion Gap 02/24/2020 14.0  5.0 - 15.0 mmol/L Final   • Lipase 02/24/2020 15  13 - 60 U/L Final   • proBNP 02/24/2020 1,871.0* 5.0 - 900.0 pg/mL Final   • Extra Tube 02/24/2020 hold for add-on   Final    Auto resulted   • Extra Tube 02/24/2020 Hold for add-ons.   Final    Auto resulted.   • Extra Tube 02/24/2020 hold for add-on   Final    Auto resulted   • Extra Tube 02/24/2020 Hold for add-ons.   Final    Auto resulted.   • WBC 02/24/2020 7.83  3.40 - 10.80 10*3/mm3 Final   • RBC 02/24/2020 3.74* 4.14 - 5.80 10*6/mm3 Final   • Hemoglobin 02/24/2020 12.0* 13.0 - 17.7 g/dL Final   • Hematocrit 02/24/2020 37.7  37.5 - 51.0 % Final   • MCV 02/24/2020 100.8* 79.0 - 97.0 fL Final   • MCH 02/24/2020 32.1  26.6 - 33.0 pg Final   • MCHC 02/24/2020 31.8  31.5 - 35.7 g/dL Final   • RDW 02/24/2020 17.2* 12.3 - 15.4 % Final   • RDW-SD 02/24/2020 64.6* 37.0 - 54.0 fl Final   • MPV 02/24/2020 11.0  6.0 - 12.0 fL Final   • Platelets 02/24/2020 205  140 - 450 10*3/mm3 Final   • Neutrophil % 02/24/2020 75.5  42.7 - 76.0 % Final   • Lymphocyte % 02/24/2020 12.6* 19.6 - 45.3 % Final   • Monocyte % 02/24/2020 9.2  5.0 - 12.0 % Final   • Eosinophil % 02/24/2020 1.4  0.3 - 6.2 % Final   • Basophil % 02/24/2020 0.3  0.0 - 1.5 % Final   • Immature Grans % 02/24/2020 1.0* 0.0 - 0.5 % Final   • Neutrophils,  Absolute 02/24/2020 5.91  1.70 - 7.00 10*3/mm3 Final   • Lymphocytes, Absolute 02/24/2020 0.99  0.70 - 3.10 10*3/mm3 Final   • Monocytes, Absolute 02/24/2020 0.72  0.10 - 0.90 10*3/mm3 Final   • Eosinophils, Absolute 02/24/2020 0.11  0.00 - 0.40 10*3/mm3 Final   • Basophils, Absolute 02/24/2020 0.02  0.00 - 0.20 10*3/mm3 Final   • Immature Grans, Absolute 02/24/2020 0.08* 0.00 - 0.05 10*3/mm3 Final   • nRBC 02/24/2020 0.0  0.0 - 0.2 /100 WBC Final   • Lactate 02/24/2020 2.9* 0.5 - 2.0 mmol/L Final    Falsely depressed results may occur on samples drawn from patients receiving N-Acetylcysteine (NAC) or Metamizole.   • Blood Culture 02/24/2020 No growth at 5 days   Final   • Blood Culture 02/24/2020 No growth at 5 days   Final   • Hold Tube 02/24/2020 Hold for add-ons.   Final    Auto resulted.   • Color, UA 02/24/2020 Yellow  Yellow, Straw Final   • Appearance, UA 02/24/2020 Clear  Clear Final   • pH, UA 02/24/2020 <=5.0  5.0 - 8.0 Final   • Specific Gravity, UA 02/24/2020 1.050* 1.001 - 1.030 Final   • Glucose, UA 02/24/2020 >=1000 mg/dL (3+)* Negative Final   • Ketones, UA 02/24/2020 Negative  Negative Final   • Bilirubin, UA 02/24/2020 Negative  Negative Final   • Blood, UA 02/24/2020 Negative  Negative Final   • Protein, UA 02/24/2020 30 mg/dL (1+)* Negative Final   • Leuk Esterase, UA 02/24/2020 Negative  Negative Final   • Nitrite, UA 02/24/2020 Negative  Negative Final   • Urobilinogen, UA 02/24/2020 0.2 E.U./dL  0.2 - 1.0 E.U./dL Final   • Procalcitonin 02/24/2020 0.28* 0.10 - 0.25 ng/mL Final   • Influenza A PCR 02/24/2020 Not Detected  Not Detected Final   • Influenza B PCR 02/24/2020 Not Detected  Not Detected Final   • Lactate 02/24/2020 2.0  0.5 - 2.0 mmol/L Final    Falsely depressed results may occur on samples drawn from patients receiving N-Acetylcysteine (NAC) or Metamizole.   • Urine Culture 02/24/2020 No growth   Final   • RBC, UA 02/24/2020 0-2  None Seen, 0-2 /HPF Final   • WBC, UA 02/24/2020  0-2  None Seen, 0-2 /HPF Final   • Bacteria, UA 02/24/2020 None Seen  None Seen, Trace /HPF Final   • Squamous Epithelial Cells, UA 02/24/2020 None Seen  None Seen, 0-2 /HPF Final   • Hyaline Casts, UA 02/24/2020 None Seen  0 - 6 /LPF Final   • Methodology 02/24/2020 Automated Microscopy   Final   • Hemoglobin A1C 02/24/2020 9.40* 4.80 - 5.60 % Final   • ADENOVIRUS, PCR 02/25/2020 Not Detected  Not Detected Final   • Coronavirus 229E 02/25/2020 Not Detected  Not Detected Final   • Coronavirus HKU1 02/25/2020 Not Detected  Not Detected Final   • Coronavirus NL63 02/25/2020 Not Detected  Not Detected Final   • Coronavirus OC43 02/25/2020 Not Detected  Not Detected Final   • Human Metapneumovirus 02/25/2020 Not Detected  Not Detected Final   • Human Rhinovirus/Enterovirus 02/25/2020 Not Detected  Not Detected Final   • Influenza B PCR 02/25/2020 Not Detected  Not Detected Final   • Parainfluenza Virus 1 02/25/2020 Not Detected  Not Detected Final   • Parainfluenza Virus 2 02/25/2020 Not Detected  Not Detected Final   • Parainfluenza Virus 3 02/25/2020 Not Detected  Not Detected Final   • Parainfluenza Virus 4 02/25/2020 Not Detected  Not Detected Final   • Bordetella pertussis pcr 02/25/2020 Not Detected  Not Detected Final   • Influenza A H1 2009 PCR 02/25/2020 Not Detected  Not Detected Final   • Chlamydophila pneumoniae PCR 02/25/2020 Not Detected  Not Detected Final   • Mycoplasma pneumo by PCR 02/25/2020 Not Detected  Not Detected Final   • Influenza A PCR 02/25/2020 Not Detected  Not Detected Final   • Influenza A H3 02/25/2020 Not Detected  Not Detected Final   • Influenza A H1 02/25/2020 Not Detected  Not Detected Final   • RSV, PCR 02/25/2020 Not Detected  Not Detected Final   • Bordetella parapertussis PCR 02/25/2020 Not Detected  Not Detected Final   • Troponin T 02/24/2020 <0.010  0.000 - 0.030 ng/mL Final   • Troponin T 02/24/2020 <0.010  0.000 - 0.030 ng/mL Final   • Glucose 02/24/2020 302* 70 - 130 mg/dL  Final   • WBC 02/25/2020 6.97  3.40 - 10.80 10*3/mm3 Final   • RBC 02/25/2020 3.48* 4.14 - 5.80 10*6/mm3 Final   • Hemoglobin 02/25/2020 10.9* 13.0 - 17.7 g/dL Final   • Hematocrit 02/25/2020 34.5* 37.5 - 51.0 % Final   • MCV 02/25/2020 99.1* 79.0 - 97.0 fL Final   • MCH 02/25/2020 31.3  26.6 - 33.0 pg Final   • MCHC 02/25/2020 31.6  31.5 - 35.7 g/dL Final   • RDW 02/25/2020 17.2* 12.3 - 15.4 % Final   • RDW-SD 02/25/2020 63.0* 37.0 - 54.0 fl Final   • MPV 02/25/2020 11.2  6.0 - 12.0 fL Final   • Platelets 02/25/2020 179  140 - 450 10*3/mm3 Final   • Neutrophil % 02/25/2020 75.1  42.7 - 76.0 % Final   • Lymphocyte % 02/25/2020 12.5* 19.6 - 45.3 % Final   • Monocyte % 02/25/2020 10.2  5.0 - 12.0 % Final   • Eosinophil % 02/25/2020 1.3  0.3 - 6.2 % Final   • Basophil % 02/25/2020 0.3  0.0 - 1.5 % Final   • Immature Grans % 02/25/2020 0.6* 0.0 - 0.5 % Final   • Neutrophils, Absolute 02/25/2020 5.24  1.70 - 7.00 10*3/mm3 Final   • Lymphocytes, Absolute 02/25/2020 0.87  0.70 - 3.10 10*3/mm3 Final   • Monocytes, Absolute 02/25/2020 0.71  0.10 - 0.90 10*3/mm3 Final   • Eosinophils, Absolute 02/25/2020 0.09  0.00 - 0.40 10*3/mm3 Final   • Basophils, Absolute 02/25/2020 0.02  0.00 - 0.20 10*3/mm3 Final   • Immature Grans, Absolute 02/25/2020 0.04  0.00 - 0.05 10*3/mm3 Final   • nRBC 02/25/2020 0.0  0.0 - 0.2 /100 WBC Final   • Glucose 02/25/2020 254* 65 - 99 mg/dL Final   • BUN 02/25/2020 13  8 - 23 mg/dL Final   • Creatinine 02/25/2020 0.70* 0.76 - 1.27 mg/dL Final   • Sodium 02/25/2020 135* 136 - 145 mmol/L Final   • Potassium 02/25/2020 3.6  3.5 - 5.2 mmol/L Final   • Chloride 02/25/2020 102  98 - 107 mmol/L Final   • CO2 02/25/2020 20.0* 22.0 - 29.0 mmol/L Final   • Calcium 02/25/2020 8.1* 8.6 - 10.5 mg/dL Final   • Total Protein 02/25/2020 6.5  6.0 - 8.5 g/dL Final   • Albumin 02/25/2020 2.90* 3.50 - 5.20 g/dL Final   • ALT (SGPT) 02/25/2020 37  1 - 41 U/L Final   • AST (SGOT) 02/25/2020 45* 1 - 40 U/L Final   •  Alkaline Phosphatase 02/25/2020 150* 39 - 117 U/L Final   • Total Bilirubin 02/25/2020 0.4  0.2 - 1.2 mg/dL Final   • eGFR Non African Amer 02/25/2020 112  >60 mL/min/1.73 Final   • Globulin 02/25/2020 3.6  gm/dL Final   • A/G Ratio 02/25/2020 0.8  g/dL Final   • BUN/Creatinine Ratio 02/25/2020 18.6  7.0 - 25.0 Final   • Anion Gap 02/25/2020 13.0  5.0 - 15.0 mmol/L Final   • Glucose 02/25/2020 338* 70 - 130 mg/dL Final   • Glucose 02/25/2020 238* 70 - 130 mg/dL Final   • Glucose 02/25/2020 333* 70 - 130 mg/dL Final   • Glucose 02/25/2020 329* 70 - 130 mg/dL Final   • Glucose 02/26/2020 208* 65 - 99 mg/dL Final   • BUN 02/26/2020 11  8 - 23 mg/dL Final   • Creatinine 02/26/2020 0.67* 0.76 - 1.27 mg/dL Final   • Sodium 02/26/2020 138  136 - 145 mmol/L Final   • Potassium 02/26/2020 4.3  3.5 - 5.2 mmol/L Final   • Chloride 02/26/2020 103  98 - 107 mmol/L Final   • CO2 02/26/2020 24.0  22.0 - 29.0 mmol/L Final   • Calcium 02/26/2020 8.5* 8.6 - 10.5 mg/dL Final   • eGFR Non African Amer 02/26/2020 118  >60 mL/min/1.73 Final   • BUN/Creatinine Ratio 02/26/2020 16.4  7.0 - 25.0 Final   • Anion Gap 02/26/2020 11.0  5.0 - 15.0 mmol/L Final   • WBC 02/26/2020 7.44  3.40 - 10.80 10*3/mm3 Final   • RBC 02/26/2020 3.38* 4.14 - 5.80 10*6/mm3 Final   • Hemoglobin 02/26/2020 10.8* 13.0 - 17.7 g/dL Final   • Hematocrit 02/26/2020 33.5* 37.5 - 51.0 % Final   • MCV 02/26/2020 99.1* 79.0 - 97.0 fL Final   • MCH 02/26/2020 32.0  26.6 - 33.0 pg Final   • MCHC 02/26/2020 32.2  31.5 - 35.7 g/dL Final   • RDW 02/26/2020 16.9* 12.3 - 15.4 % Final   • RDW-SD 02/26/2020 62.2* 37.0 - 54.0 fl Final   • MPV 02/26/2020 10.6  6.0 - 12.0 fL Final   • Platelets 02/26/2020 162  140 - 450 10*3/mm3 Final   • Glucose 02/26/2020 224* 70 - 130 mg/dL Final   • Glucose 02/26/2020 273* 70 - 130 mg/dL Final        Ct Abdomen Pelvis With Contrast    Result Date: 2/25/2020  Narrative: EXAMINATION: CT ANGIOGRAM CHEST-, CT ABDOMEN AND PELVIS W CONTRAST-   INDICATION: Chest pain, shortness of breath, fatigue, history of lung cancer with mets to bone and liver, fever, cough.  TECHNIQUE: Spiral acquisition 3 mm post IV contrast images through the chest with sagittal and coronal 2-D reconstructions. 5 mm post IV contrast portal venous phase and delayed venous phase images through the abdomen and pelvis.  The radiation dose reduction device was turned on for each scan per the ALARA (As Low as Reasonably Achievable) protocol.  COMPARISON: Chest, abdomen and pelvis CT scans of 12/07/2019.  FINDINGS: History today indicates chest pain, dyspnea and fatigue, fever and cough, history of metastatic lung cancer to bone and liver. Previous exams from 12/07/2019 by report showed improved for metastatic disease. Development of mild bony blastic metastatic disease. Improved disease in the chest.  ANGIOGRAPHIC CHEST CT SCAN: There has been significant interval progression of right hilar/mediastinal disease, and some nodular disease of the right lower lung inferior of the right hilum. Pleurx catheter has been removed, and there is only a small residual pleural effusion. Thoracic aorta appears grossly normal. There is good contrast opacification of the pulmonary arteries, and no evidence of significant embolic disease. There is limited evaluation of several smaller vessels in the right lung base due to what appear to be small metastatic nodules along the lower lobe branch arteries, but these are not thought to represent pulmonary emboli.  Lung window images show some increasing right lung volume loss and generalized fashion without significant focal atelectasis. No clearly new pulmonary parenchymal disease is seen elsewhere. Extensive blastic bony disease is similar to the prior study.      Impression: 1. No definite evidence of pulmonary embolus. 2. Interval development of right hilar and mediastinal adenopathy, and a few smaller nodules along the right lower lobe vessels. These  resemble small emboli, but embolic disease is not suspected. 3. Small residual right pleural effusion after removal of Pleurx catheter, actually decreased from the prior study. 4. Extensive and diffuse blastic bony disease similar to prior study.    ABDOMEN AND PELVIS CT SCAN WITH IV CONTRAST: There are numerous lesions throughout the liver, with significant progression from 12/07/2019. Previously noted lesions have significantly increased, for instance a 9 mm lesion seen in the left lower lobe, lateral segment has increased approximately 3 cm.  A 9 mm lesion in the central inferior right liver lobe, segment 6 has increased to 22 mm. Adrenal glands remain normal in size. Spleen is not enlarged. No significant abnormalities are seen of the pancreas. There are small nonobstructing bilateral renal calculi. There is a small amount of fluid around the gallbladder, and there is a single calcified gallstone visible. There is new celiac axis adenopathy, with multiple nodes between 15 and 20 mm. There is new periaortic adenopathy, with nodes up to 25 mm. Bowel loops are normal in caliber and normal in appearance. No ascites or adenopathy is seen.  Regarding the lower abdomen and pelvis, no intrapelvic mass, adenopathy, ascites or inflammatory change is seen. Delayed images show contrast opacification of the normal caliber renal collecting systems ureters, and bladder. Widespread blastic bony metastatic disease appears similar to the prior study.  IMPRESSION: 1. Significant interval progression of liver metastatic disease compared to 12/07/2019 exam. 2. New and fairly extensive celiac axis and periaortic lymphadenopathy. 3. Gallstone, and some fluid around the gallbladder, which may be incidental, but please correlate with any right upper quadrant symptoms. 4. Widespread but grossly stable blastic bony metastatic disease.  D:  02/24/2020 E:  02/25/2020  This report was finalized on 2/25/2020 9:39 PM by Dr. Guru Werner MD.       Xr Chest 1 View    Result Date: 2/26/2020  Narrative: EXAMINATION: XR CHEST 1 VW-02/26/2020:  INDICATION: DYSPNEA ON EXERTION.  COMPARISON: 02/25/2020.  FINDINGS: Left-sided Port-A-Cath is noted. The heart is normal in size. The vasculature appears normal. Small amount of right pleural fluid appears stable. Minimal left basilar interstitial changes are stable. No pneumothorax or edema is seen.      Impression: No new chest disease.  D:  02/26/2020 E:  02/26/2020     This report was finalized on 2/26/2020 10:32 PM by Dr. Guru Werner MD.      Xr Chest 1 View    Result Date: 2/25/2020  Narrative: EXAMINATION: XR CHEST 1 VW- 02/25/2020  INDICATION: possible PNA, rales right side; A41.9-Sepsis, unspecified organism; R07.9-Chest pain, unspecified; R10.10-Upper abdominal pain, unspecified; J40-Bronchitis, not specified as acute or chronic; Z79.899-Other long term (current) drug therapy; C79.9-Secondary malignant neoplasm of unspecified site  COMPARISON: 02/24/2020  FINDINGS: Left-sided Port-A-Cath is again noted. Heart is normal in size. Vasculature appears upper limits of normal. A couple of granulomatous calcifications and mild chronic appearing interstitial changes are again seen on the right. There is a small right effusion stable or minimally increased. There appears to be a skin fold shadow superimposed over the right chest. No pneumothorax is suspected.      Impression: Mildly increased right basilar effusion. Probable skinfold shadow as noted. No other new chest disease is seen.   D:  02/25/2020 E:  02/25/2020  This report was finalized on 2/25/2020 9:49 PM by Dr. Guru Werner MD.      Xr Chest 1 View    Result Date: 2/24/2020  Narrative: EXAMINATION: XR CHEST 1 VW- 02/24/2020  INDICATION: Chest Pain triage protocol  COMPARISON: 01/15/2020  FINDINGS: Left-sided Port-A-Cath is again seen tip in the distal SVC. There appears to be mild right basilar effusion. Previously noted chest tube has been removed. Lungs  otherwise appear clear except for chronic appearing interstitial changes. No pneumothorax or edema is seen. Heart and vasculature appear within normal limits.      Impression: Very mild right basilar effusion and atelectasis. No other new chest disease is seen.  D:  02/24/2020 E:  02/24/2020    This report was finalized on 2/24/2020 10:32 PM by Dr. Guru Werner MD.      Ct Angiogram Chest    Result Date: 2/25/2020  Narrative: EXAMINATION: CT ANGIOGRAM CHEST-, CT ABDOMEN AND PELVIS W CONTRAST-  INDICATION: Chest pain, shortness of breath, fatigue, history of lung cancer with mets to bone and liver, fever, cough.  TECHNIQUE: Spiral acquisition 3 mm post IV contrast images through the chest with sagittal and coronal 2-D reconstructions. 5 mm post IV contrast portal venous phase and delayed venous phase images through the abdomen and pelvis.  The radiation dose reduction device was turned on for each scan per the ALARA (As Low as Reasonably Achievable) protocol.  COMPARISON: Chest, abdomen and pelvis CT scans of 12/07/2019.  FINDINGS: History today indicates chest pain, dyspnea and fatigue, fever and cough, history of metastatic lung cancer to bone and liver. Previous exams from 12/07/2019 by report showed improved for metastatic disease. Development of mild bony blastic metastatic disease. Improved disease in the chest.  ANGIOGRAPHIC CHEST CT SCAN: There has been significant interval progression of right hilar/mediastinal disease, and some nodular disease of the right lower lung inferior of the right hilum. Pleurx catheter has been removed, and there is only a small residual pleural effusion. Thoracic aorta appears grossly normal. There is good contrast opacification of the pulmonary arteries, and no evidence of significant embolic disease. There is limited evaluation of several smaller vessels in the right lung base due to what appear to be small metastatic nodules along the lower lobe branch arteries, but these are not  thought to represent pulmonary emboli.  Lung window images show some increasing right lung volume loss and generalized fashion without significant focal atelectasis. No clearly new pulmonary parenchymal disease is seen elsewhere. Extensive blastic bony disease is similar to the prior study.      Impression: 1. No definite evidence of pulmonary embolus. 2. Interval development of right hilar and mediastinal adenopathy, and a few smaller nodules along the right lower lobe vessels. These resemble small emboli, but embolic disease is not suspected. 3. Small residual right pleural effusion after removal of Pleurx catheter, actually decreased from the prior study. 4. Extensive and diffuse blastic bony disease similar to prior study.    ABDOMEN AND PELVIS CT SCAN WITH IV CONTRAST: There are numerous lesions throughout the liver, with significant progression from 12/07/2019. Previously noted lesions have significantly increased, for instance a 9 mm lesion seen in the left lower lobe, lateral segment has increased approximately 3 cm.  A 9 mm lesion in the central inferior right liver lobe, segment 6 has increased to 22 mm. Adrenal glands remain normal in size. Spleen is not enlarged. No significant abnormalities are seen of the pancreas. There are small nonobstructing bilateral renal calculi. There is a small amount of fluid around the gallbladder, and there is a single calcified gallstone visible. There is new celiac axis adenopathy, with multiple nodes between 15 and 20 mm. There is new periaortic adenopathy, with nodes up to 25 mm. Bowel loops are normal in caliber and normal in appearance. No ascites or adenopathy is seen.  Regarding the lower abdomen and pelvis, no intrapelvic mass, adenopathy, ascites or inflammatory change is seen. Delayed images show contrast opacification of the normal caliber renal collecting systems ureters, and bladder. Widespread blastic bony metastatic disease appears similar to the prior  study.  IMPRESSION: 1. Significant interval progression of liver metastatic disease compared to 12/07/2019 exam. 2. New and fairly extensive celiac axis and periaortic lymphadenopathy. 3. Gallstone, and some fluid around the gallbladder, which may be incidental, but please correlate with any right upper quadrant symptoms. 4. Widespread but grossly stable blastic bony metastatic disease.  D:  02/24/2020 E:  02/25/2020  This report was finalized on 2/25/2020 9:39 PM by Dr. Guru Werner MD.        ASSESSMENT: The patient is a very pleasant 69 y.o. male  with extensive stage small cell lung cancer  Problem list:  1.  Extensive stage small cell lung cancer:  A.  Presented with abdominal pain and shortness of breath  B.  CT chest abdomen pelvis revealed right upper lobe lung mass with multiple pleural-based masses rectal effusion and diffuse liver metastases  C.  Status post bronchoscopy with biopsy done on October 4 2019 that revealed small cell lung cancer  D.  Started carboplatin and etoposide cycle #1 October 9, 2019, status post 6 cycles  E. Switched to maintenance Tecentriq on 12/31/2019.  Stopped February 20 22nd to progressive disease document CT chest done February 24, 2020  F.  Started Taxol weekly March 2, 2020  2.  Right pleural effusion:  A.  Status post thoracentesis done on October 6, 2019  B.  Status post Pleurx catheter placement done by Dr. Sky October 10, 2019  C.  Catheter removed last month by Dr. Lai.  3.    History of hepatitis C:  A.  Status post treatment with interferon.  B.  Hepatitis C antibodies positive but viral load quantitative serology negative  4.  Cancer related pain  5.  Constipation  6.  Chemotherapy induced nausea  7.  Type 2 diabetes  8.  Left MCA embolic stroke  9.  Shortness of breath    PLAN:  1.  We will proceed with treatment today using Taxol weekly 3 weeks on 1 week off cycle #1.  2.  The patient follow-up with me next month for cycle #2.  3. I will repeat scans prior to  cycle #3.  4.  We will continue to monitor the patient's blood work including blood counts, kidney function, liver function, and electrolytes.  5.  His Pleurx catheter has been removed secondary to local irritation as well as no output for over two weeks. We will consider repeat thoracentesis in the future if needed for recurrent pleural effusion.   6.  We will continue Zofran as needed for chemotherapy induced nausea.    7.  We will continue port care with use of EMLA cream prior to port access.  8. We discussed the side effects of this regimen including alopecia, nausea, fatigue, myelosuppression, infusion reaction, neuropathy, and diarrhea.  9.  We will continue extended release morphine as well as oxycodone short acting for cancer related pain.  This has been prescribed by the palliative care clinic.  10. I encouraged the patient to start on daily bowel regimen of Miralax 1-2 times per day, stool softeners twice a day, and Sennakot 2 tabs at bedtime. I told him to titrate this based upon bowel function, and add Milk of Magnesia if needed for constipation. We will consider adding lactulose if needed.   11.  We will continue metformin for type 2 diabetes.  12. We will continue Eliquis 5 mg twice daily.  I encouraged him to be compliant with dosing twice a day.  13.  We will continue Ativan 1 mg 3 times a day as needed for anxiety and sleep disturbance.  14.  I encouraged the patient to wear his supplemental oxygen when he has feelings of shortness of breath.    15. I encouraged the patient to monitor his blood sugars at home.   16. We will continue Megace daily for poor appetite and weight loss.    Krishna Ren MD  3/2/2020   1:17 PM

## 2020-03-03 NOTE — PROGRESS NOTES
Onc Nutrition     Patient:  Calin Portillo  YOB: 1950    Diagnosis: extensive stage small cell lung cancer  Treatment:  Taxol - days 1,8,15 - every 28 days    Weight: 164# - ~15# weight gain x ~6 weeks  Nutrition Symptoms: taste changes, decreased appetite / lack of desire to eat, hyperglycemia    Met with patient and his wife during his chemotherapy infusion appointment.  Note patient has started on a new treatment plan due to progressive disease.  Patient continues to complain of taste changes and poor appetite.  He denies nausea and states his constipation has improved.  He states he is drinking ~3 Ensure Plus daily to aid with oral intake.  Note patient with hyperglycemia and has a history of diabetes - he has an appointment with his primary care on 3/5.    Reviewed the importance of good nutrition during his treatment course.  Advised him to be eating smaller more frequent meals/snacks throughout the day with an emphasis on high protein foods and adequate hydration.  Discussed nutritional supplements and their role in the diet.  Provided sample of Boost Glucose Control for him to try and to aid with blood glucose management.  Provided coupons for Boost and Ensure products.  Advised him to use the baking soda / salt mouth rinses before eating to aid with taste changes.  Provided several written diet materials to reinforce information discussed.      Answered their questions and both voiced understanding of information discussed.  Encouraged them to call RD with questions.  Will monitor as needed during his treatment course.    Olivia Laurent RD  03/03/20

## 2020-03-04 NOTE — PROGRESS NOTES
Transitional Care Follow Up Visit  Subjective     Calin Portillo is a 69 y.o. male who presents for a transitional care management visit.    Within 48 business hours after discharge our office contacted him via telephone to coordinate his care and needs.      I reviewed and discussed the details of that call along with the discharge summary, hospital problems, inpatient lab results, inpatient diagnostic studies, and consultation reports with Calin.     Current outpatient and discharge medications have been reconciled for the patient.  Reviewed by: Rakesh Alcantara MD      Date of TCM Phone Call 2/28/2020   [facility]   Date of Admission 2/24/2020   Date of Discharge 2/26/2020   Discharge Disposition Home or Self Care     Risk for Readmission (LACE) Score: 13 (2/26/2020  6:00 AM)      History of Present Illness   Course During Hospital Stay:  Here for f/u after recent hospitalization for increased weakness/sob.  Found to have progression of metastatic lung cancer.  Has started Taxol infusion.  Has 1 good day out of 3.  Breathing is better.  Appetite is none.  Has lost weight.  Has tolerated chemo better.  FSBS are running high.  Having polyuria and dipsia.  Currently on metformin and glyxambi.  Energy level is not the best.      The following portions of the patient's history were reviewed and updated as appropriate: allergies, current medications, past family history, past medical history, past social history, past surgical history and problem list.    Review of Systems   Constitutional: Positive for appetite change, fatigue and unexpected weight change. Negative for chills and fever.   HENT: Negative for congestion, ear pain, hearing loss, rhinorrhea, sinus pressure, sore throat and trouble swallowing.    Eyes: Negative for discharge and itching.   Respiratory: Positive for shortness of breath. Negative for cough and chest tightness.    Cardiovascular: Negative for chest pain,  palpitations and leg swelling.   Gastrointestinal: Positive for constipation. Negative for abdominal pain, blood in stool, diarrhea and vomiting.        11/16 colonoscopy without polyps   Endocrine: Negative for polydipsia and polyuria.   Genitourinary: Negative for difficulty urinating, dysuria, enuresis, frequency, hematuria and urgency.        8/18 PSA   Musculoskeletal: Positive for arthralgias and back pain. Negative for gait problem and joint swelling.   Skin: Negative for rash and wound.   Allergic/Immunologic: Negative for immunocompromised state.   Neurological: Positive for weakness. Negative for dizziness, syncope, light-headedness, numbness and headaches.   Hematological: Does not bruise/bleed easily.   Psychiatric/Behavioral: Positive for dysphoric mood and sleep disturbance. Negative for behavioral problems. The patient is not nervous/anxious.        Objective   Physical Exam   Constitutional: He is oriented to person, place, and time.   HENT:   Head: Normocephalic and atraumatic.   Right Ear: External ear normal.   Left Ear: External ear normal.   Mouth/Throat: Oropharynx is clear and moist.   Eyes: Conjunctivae and EOM are normal.   Neck: Normal range of motion. Neck supple.   Cardiovascular: Normal rate, regular rhythm and normal heart sounds.   Pulmonary/Chest: Effort normal.   Diminished BS   Abdominal: Soft. Bowel sounds are normal.   Musculoskeletal: Normal range of motion.   Lymphadenopathy:     He has no cervical adenopathy.   Neurological: He is alert and oriented to person, place, and time.   Skin: Skin is warm and dry.   Psychiatric: He has a normal mood and affect. His behavior is normal. Thought content normal.       Assessment/Plan   Calin was seen today for lung cancer.    Diagnoses and all orders for this visit:    Other hyperlipidemia    AIS L MCA s/p tPA + thrombectomy    Extensive stage small cell lung ca (RUL, bone & liver mets) - S/p Palliative Chemo x 3  cycles    Gastroesophageal reflux disease with esophagitis    Uncontrolled type 2 diabetes mellitus with hyperglycemia (CMS/HCC)  -     nateglinide (STARLIX) 120 MG tablet; Take 1 tablet by mouth 3 (Three) Times a Day Before Meals.    Chronic pain syndrome    Chronic neck and back pain    Depression, unspecified depression type             Cad- Asa qam , statin tx  Hyperlipidemia-cont lipitor, labs soon  Chronic neck and back pain-counseled on LT risk of med  Depression-an issue with current lung ca cancer  Dm-counseled on diet, cont metformin and glyxambi, will add starlix  Lung cancer-per Dr Ren  GERD-stable  CVA-cont eliquis

## 2020-03-05 NOTE — OUTREACH NOTE
Medical Week 2 Survey      Responses   [facility] patient discharged from?  [place]   Does the patient have one of the following disease processes/diagnoses(primary or secondary)?  Other   Week 2 attempt successful?  No   Unsuccessful attempts  Attempt 1          Esmer Lopez RN

## 2020-03-06 NOTE — OUTREACH NOTE
Medical Week 2 Survey      Responses   [facility] patient discharged from?  [place]   Does the patient have one of the following disease processes/diagnoses(primary or secondary)?  Other   Week 2 attempt successful?  Yes   Call start time  1556   Discharge diagnosis  SIRS,  lung mass,  T2Dm   Call end time  1605   Meds reviewed with patient/caregiver?  Yes   Is the patient taking all medications as directed (includes completed medication regime)?  Yes   Medication comments  Pain fairly well controlled   Has the patient kept scheduled appointments due by today?  Yes   Psychosocial issues?  No   What is the patient's perception of their health status since discharge?  Same   Is the patient/caregiver able to teach back signs and symptoms related to disease process for when to call PCP?  Yes   Is the patient/caregiver able to teach back signs and symptoms related to disease process for when to call 911?  Yes   Is the patient/caregiver able to teach back the hierarchy of who to call/visit for symptoms/problems? PCP, Specialist, Home health nurse, Urgent Care, ED, 911  Yes   Additional teach back comments  Pt states he is doing fair. Pain is under control---has f/u with oncology. Enc him to speak with MD about appetite enhancer.    Week 2 Call Completed?  Yes          Alejandra Meadows, RN

## 2020-03-18 NOTE — PROGRESS NOTES
"Patient pain \"6\" in mid back and states that it feels like it did when he went to ED and they had to drain fluid. Has not taken home pain meds as instructed. Sat's 97 , RR even unlabored @ 16   Took \" 2\" of morphine and no Oxycodone because he doesn't want to \"max out \" dose for when he needs it.  Teaching at length with pt and SO.  Sat's 97 , RR even unlabored. Orders for pain meds and cxr after infusion completed.  Patient agreeable.  "

## 2020-04-03 NOTE — PROGRESS NOTES
DATE OF VISIT: 4/3/2020    REASON FOR VISIT: Followup for extensive stage small cell lung cancer     HISTORY OF PRESENT ILLNESS: The patient is a very pleasant 69 y.o. male  with past medical history significant for extensive stage small cell lung cancer diagnosed October 4, 2019.  He was started on palliative treatment with carbo etoposide and Tecentriq October 9, 2019.  He completed cycle #4 of treatment on 12/9/2019. He was switched to maintenance Tecentriq on 12/31/2019.  Repeat scans done February 24, 2020 revealed progressive disease lung liver and bones.  Treatment switched to Taxol weekly started on March 2, 2020.  The patient is here today for scheduled follow up visit with treatment, cycle 2 day 1.     SUBJECTIVE: The patient is here today with his wife. He has been doing fair. He complains of increased nausea with occasional vomiting and poor appetite. He has only taken a couple doses of his Zofran because he was not sure if he should be using it or not. He is trying to drink Ensure and milkshakes, but his intake otherwise has been decreased. He is trying to drink fluids, but his urine is dark. He denies dysuria or hematuria. He is voiding frequently. He continues to struggle with constipation. He is taking Linzess as prescribed by his PCP as well as lactulose as needed. He feels like he is unable to pass hard stool near the rectum. He feels too weak to push adequately. He had a small bowel movement yesterday that was hard. His breathing is stable. He is weak and his activity is limited. His pain control continues to be an issue. He is following up with Dr. Alcantara who is prescribing his pain medication currently.     PAST MEDICAL HISTORY/SOCIAL HISTORY/FAMILY HISTORY: Reviewed by me and unchanged from my documentation done on 04/03/20.    Review of Systems   Constitutional: Positive for appetite change, fatigue and unexpected weight change. Negative for activity change, chills and fever.   HENT: Negative  for hearing loss, mouth sores, nosebleeds, sore throat and trouble swallowing.    Eyes: Negative for visual disturbance.   Respiratory: Positive for shortness of breath. Negative for cough, chest tightness and wheezing.    Cardiovascular: Positive for chest pain. Negative for palpitations and leg swelling.        Right sided   Gastrointestinal: Positive for constipation, nausea and vomiting. Negative for abdominal distention, abdominal pain, blood in stool, diarrhea and rectal pain.   Endocrine: Positive for polyuria. Negative for cold intolerance, heat intolerance and polydipsia.        Night sweats   Genitourinary: Negative for difficulty urinating, dysuria, frequency and urgency.   Musculoskeletal: Negative for arthralgias, back pain, gait problem, joint swelling and myalgias.   Skin: Negative for rash.   Neurological: Positive for weakness. Negative for dizziness, tremors, syncope, light-headedness, numbness and headaches.   Hematological: Negative for adenopathy. Does not bruise/bleed easily.   Psychiatric/Behavioral: Positive for sleep disturbance. Negative for confusion and suicidal ideas. The patient is nervous/anxious.          Current Outpatient Medications:   •  apixaban (ELIQUIS) 5 MG tablet tablet, Take 1 tablet by mouth 2 (Two) Times a Day., Disp: 60 tablet, Rfl: 5  •  aspirin 81 MG EC tablet, Take 1 tablet by mouth Daily., Disp: , Rfl:   •  atorvastatin (LIPITOR) 80 MG tablet, Take 1 tablet by mouth Every Night., Disp: 90 tablet, Rfl: 3  •  docusate sodium (COLACE) 100 MG capsule, Take 2 capsules by mouth 2 (Two) Times a Day As Needed for Constipation., Disp: 120 capsule, Rfl: 3  •  Empagliflozin-linaGLIPtin (GLYXAMBI) 25-5 MG tablet, Take 0.5 tablets by mouth Every Morning., Disp: , Rfl:   •  fluticasone (FLONASE) 50 MCG/ACT nasal spray, 1 spray into the nostril(s) as directed by provider 2 (Two) Times a Day., Disp: 3 bottle, Rfl: 3  •  Fluticasone-Umeclidin-Vilant (TRELEGY ELLIPTA) 100-62.5-25  "MCG/INH aerosol powder , Inhale 1 each Every Morning., Disp: 28 each, Rfl: 0  •  gabapentin (NEURONTIN) 300 MG capsule, Take 300 mg by mouth 3 (Three) Times a Day., Disp: , Rfl:   •  lidocaine-prilocaine (EMLA) 2.5-2.5 % cream, Apply  topically to the appropriate area as directed As Needed (45-60 minutes prior to port access.  Cover with saran/plastic wrap.)., Disp: 30 g, Rfl: 3  •  LORazepam (ATIVAN) 1 MG tablet, Take 1 tablet by mouth Every 8 (Eight) Hours As Needed for Anxiety., Disp: 90 tablet, Rfl: 2  •  metFORMIN ER (GLUCOPHAGE-XR) 500 MG 24 hr tablet, Take 2 tablets by mouth 2 (Two) Times a Day., Disp: 360 tablet, Rfl: 3  •  mirtazapine (REMERON) 15 MG tablet, Take 1 tablet by mouth Every Night. For appetite, Disp: 90 tablet, Rfl: 3  •  Morphine (MS CONTIN) 15 MG 12 hr tablet, Take 3 tablets by mouth 2 (Two) Times a Day., Disp: 180 tablet, Rfl: 0  •  nateglinide (STARLIX) 120 MG tablet, Take 1 tablet by mouth 3 (Three) Times a Day Before Meals., Disp: 270 tablet, Rfl: 3  •  ondansetron (ZOFRAN) 8 MG tablet, Take 1 tablet by mouth Every 8 (Eight) Hours As Needed for Nausea for up to 60 doses., Disp: 30 tablet, Rfl: 5  •  oxyCODONE (ROXICODONE) 10 MG tablet, Take 1 tablet by mouth 4 (Four) Times a Day Before Meals & at Bedtime., Disp: 120 tablet, Rfl: 0  •  zolpidem (AMBIEN) 10 MG tablet, Take 1 tablet by mouth At Night As Needed for Sleep., Disp: 30 tablet, Rfl: 2    PHYSICAL EXAMINATION:   /76   Pulse (!) 122   Temp 97.2 °F (36.2 °C) (Temporal)   Resp 18   Ht 180.3 cm (70.98\")   Wt 67.6 kg (149 lb)   SpO2 95%   BMI 20.79 kg/m²    Pain Score    04/03/20 1252   PainSc:   9   PainLoc: Comment: right side      ECOG Performance Status: 1 - Symptomatic but completely ambulatory  General Appearance:  alert, cooperative, anxious   Neurologic/Psychiatric: A&O x 3, gait steady, appropriate affect, strength 5/5 in all muscle groups   HEENT:  Normocephalic, without obvious abnormality, mucous membranes moist "   Neck: Supple, symmetrical, trachea midline, no adenopathy;  No thyromegaly, masses, or tenderness   Lungs:   Short of breathClear to auscultation bilaterally; respirations regular, even, and unlabored bilaterally   Heart:  Regular rate and rhythm, no murmurs appreciated   Abdomen:   Soft, non-tender, non-distended and no organomegaly   Lymph nodes: No cervical, supraclavicular, inguinal or axillary adenopathy noted   Extremities: Normal, atraumatic; no clubbing, cyanosis, or edema    Skin: No rashes, ulcers, or suspicious lesions noted     No visits with results within 2 Week(s) from this visit.   Latest known visit with results is:   Hospital Outpatient Visit on 03/18/2020   Component Date Value Ref Range Status   • WBC 03/18/2020 6.50  3.40 - 10.80 10*3/mm3 Final   • RBC 03/18/2020 3.44* 4.14 - 5.80 10*6/mm3 Final   • Hemoglobin 03/18/2020 10.6* 13.0 - 17.7 g/dL Final   • Hematocrit 03/18/2020 32.4* 37.5 - 51.0 % Final   • RDW 03/18/2020 18.2* 12.3 - 15.4 % Final   • MCV 03/18/2020 94.2  79.0 - 97.0 fL Final   • MCH 03/18/2020 30.8  26.6 - 33.0 pg Final   • MCHC 03/18/2020 32.7  31.5 - 35.7 g/dL Final   • MPV 03/18/2020 8.3  6.0 - 12.0 fL Final   • Platelets 03/18/2020 318  140 - 450 10*3/mm3 Final   • Neutrophil % 03/18/2020 74.4  42.7 - 76.0 % Final   • Lymphocyte % 03/18/2020 18.2* 19.6 - 45.3 % Final   • Monocyte % 03/18/2020 7.4  5.0 - 12.0 % Final   • Neutrophils, Absolute 03/18/2020 4.80  1.70 - 7.00 10*3/mm3 Final   • Lymphocytes, Absolute 03/18/2020 1.20  0.70 - 3.10 10*3/mm3 Final   • Monocytes, Absolute 03/18/2020 0.50  0.10 - 0.90 10*3/mm3 Final        Xr Chest Pa & Lateral    Result Date: 3/19/2020  Narrative:  EXAMINATION: XR CHEST PA AND LATERAL - 03/18/2020  INDICATION: C34.11-Malignant neoplasm of upper lobe, right bronchus or lung.  COMPARISON: None.  FINDINGS: PA and lateral chest reveals the cardiac and mediastinal silhouettes within normal limits. There is pleural thickening identified along  the right lateral chest wall which is unchanged. Ill-defined opacification seen within the right mid and lower lung field. Degenerative changes seen within the spine. The left lung is clear.         Impression: Stable chest as above. No change seen in the ill-defined opacification within the right mid and lower lung field. No change in the pleural thickening or pleural effusion on the right.  DICTATED:   03/18/2020 EDITED/ls :   03/18/2020  This report was finalized on 3/19/2020 9:04 AM by Dr. Olivia Pandya MD.        ASSESSMENT: The patient is a very pleasant 69 y.o. male  with extensive stage small cell lung cancer  Problem list:  1.  Extensive stage small cell lung cancer:  A.  Presented with abdominal pain and shortness of breath  B.  CT chest abdomen pelvis revealed right upper lobe lung mass with multiple pleural-based masses rectal effusion and diffuse liver metastases  C.  Status post bronchoscopy with biopsy done on October 4 2019 that revealed small cell lung cancer  D.  Started carboplatin and etoposide cycle #1 October 9, 2019, status post 6 cycles  E. Switched to maintenance Tecentriq on 12/31/2019.  Stopped February 20 22nd to progressive disease document CT chest done February 24, 2020  F.  Started Taxol weekly March 2, 2020, status post 1 cycle of treatment.   2.  Right pleural effusion:  A.  Status post thoracentesis done on October 6, 2019  B.  Status post Pleurx catheter placement done by Dr. Sky October 10, 2019  C.  Catheter removed last month by Dr. Lai.  3.    History of hepatitis C:  A.  Status post treatment with interferon.  B.  Hepatitis C antibodies positive but viral load quantitative serology negative  4.  Cancer related pain  5.  Constipation  6.  Chemotherapy induced nausea  7.  Type 2 diabetes  8.  Left MCA embolic stroke  9.  Shortness of breath  10. Dehydration    PLAN:  1.  We will proceed with treatment today using Taxol weekly 3 weeks on 1 week off cycle #2.  2.  The  patient follow-up with me next month for cycle #3.  3. We will repeat CAT scans prior to cycle #3.  4.  We will continue to monitor the patient's blood work including blood counts, kidney function, liver function, and electrolytes.  5. We will consider repeat thoracentesis in the future if needed for recurrent pleural effusion.   6.  We will continue Zofran as needed for chemotherapy induced nausea.  I encouraged him to take it on a schedule for 3-4 days following chemotherapy to see if this helps with symptoms of nausea. We will consider adding Compazine if needed for ongoing nausea and vomiting.   7.  We will continue port care with use of EMLA cream prior to port access.  8. We discussed the side effects of this regimen including alopecia, nausea, fatigue, myelosuppression, infusion reaction, neuropathy, and diarrhea.  9.  Calin Portillo reports a pain score of 9.  Given his pain assessment as noted, treatment options were discussed and the following options were decided upon as a follow-up plan to address the patient's pain: continuation of current treatment plan for painincluding extended release morphine twice a day, oxycodone 10 mg four times per day, and gabapentin 300 mg three times a day as prescribed by Dr. Alcantara.   10. The patient is using Linzess daily as well as lactulose as needed for constipation. He is still having difficulty moving his bowels. I recommended he try Fleet's enema and then add Sennakot 2 tabs at bedtime to his current bowel regimen.  11.  We will continue metformin for type 2 diabetes.  12. We will continue Eliquis 5 mg twice daily.  I encouraged him to be compliant with dosing twice a day.  13.  We will continue Ativan 1 mg 3 times a day as needed for anxiety and sleep disturbance. He is also taking Remeron 15 mg at bedtime to help with sleep.   14.  I encouraged the patient to wear his supplemental oxygen when he has feelings of shortness of breath.    15. I encouraged the  patient to monitor his blood sugars at home. I told them that hypoglycemia could be the cause of his diaphoresis at night.   16. We will add Marinol 2.5 mg twice a day for poor appetite and nausea. He was given a new prescription for this today.   17. We will add 1 L NS with each cycle of chemotherapy due to poor PO intake and dehydration.     Sasha Callejas, APRN  4/3/2020   13:50

## 2020-04-14 PROBLEM — R06.00 DYSPNEA: Status: ACTIVE | Noted: 2020-01-01

## 2020-04-14 PROBLEM — T45.1X5A ANEMIA DUE TO CHEMOTHERAPY: Status: ACTIVE | Noted: 2020-01-01

## 2020-04-14 PROBLEM — J96.21 ACUTE AND CHRONIC RESPIRATORY FAILURE WITH HYPOXIA (HCC): Status: ACTIVE | Noted: 2020-01-01

## 2020-04-14 PROBLEM — J90 PLEURAL EFFUSION, BILATERAL: Status: ACTIVE | Noted: 2020-01-01

## 2020-04-14 PROBLEM — G93.41 ENCEPHALOPATHY, METABOLIC: Status: ACTIVE | Noted: 2020-01-01

## 2020-04-14 PROBLEM — D64.81 ANEMIA DUE TO CHEMOTHERAPY: Status: ACTIVE | Noted: 2020-01-01

## 2020-04-14 NOTE — PROGRESS NOTES
"Adult Nutrition  Assessment/PES    Patient Name:  Calin Portillo  YOB: 1950  MRN: [id number]  Admit Date:  4/13/2020    Assessment Date:  4/14/2020    Comments:      Reason for Assessment     Row Name 04/14/20 1347          Reason for Assessment    Reason For Assessment  identified at risk by screening criteria     Diagnosis  -- UNCONTROLLED DM2, B/L PLEURAL EFFUSION, RESP FAILURE, ? PNA, MET ENCEPH, ANEMIA, SOA. PMH: SCLCA WITH METS TO LIVER & BONE, PALLIATIVE CHEMO, CAD, CHRONIC PAIN, DEPRESSION, HLP, HEP C HTN. PSH: KNEE, PLEURAL CATHETER.     Identified At Risk by Screening Criteria  MST SCORE 2+;reduced oral intake over the last month;unintentional loss of 10 lbs or more in the past 2 mos         Nutrition/Diet History     Row Name 04/14/20 1344          Nutrition/Diet History    Typical Food/Fluid Intake  TALKED WITH PT OVER PHONE DUE TO COVID 19 PROTOCOL. PT REPORTS HIS APPETITE & INTAKE OF FOOD HAS BEEN SIGNIFICANTLY DOWN, < 50% OF USUAL, FOR ABOUT A WEEK TO 10 DAYS. HE REPORTS HAS LOST ABOUT 15# SINCE THEN AS WELL. HE DOES NOT REMEMBER THE LAST STANDING SCALE WEIGHT. HE REPORTS CAN EAT SMALL AMOUNTS OF MOST FOOD EXCEPT DISLIKES FISH. HE REPORTS DEPENDS ON BOOST PLUS MIXED WITH WHOLE MILK TO ASSIST WITH NUTRITION INTAKE. HE WAS MOANING & GROANING DUE TO PAIN DURING THE LATTER PART OF CONVERSATION; THEREFORE, CONVERSATION WAS ENDED.          Anthropometrics     Row Name 04/14/20 1355 04/14/20 1353       Anthropometrics    Height  180.3 cm (71\")  --    Weight  -- WEIGHTS MAY BE SKEWED BY FLUID  --       Admit Weight    Admit Weight Method  stated  --    Admit Weight  63.5 kg (140 lb) 154.8# BEDSCALE  --       Ideal Body Weight (IBW)    Ideal Body Weight (IBW) (kg)  79.27  --       Usual Body Weight (UBW)    Usual Body Weight  -- PER EPIC RECORDS, WT 4/10 149# (MD OFFICE), 3/4/20 157# (MD OFFICE), 2/26/20 162# (STANDING SCALE), 1/21/20 149# (MD OFFICE), 10/28/19 159# (MD OFFICE), 8/16/19 " 170# (MD OFFICE)  --        Labs/Tests/Procedures/Meds     Row Name 04/14/20 1357          Labs/Procedures/Meds    Lab Results Reviewed  reviewed        Diagnostic Tests/Procedures    Diagnostic Test/Procedure Reviewed  reviewed     Diagnostic Test/Procedures Comments  NOTED PLANS FOR THORACENTESIS.        Medications    Pertinent Medications Reviewed  reviewed         Physical Findings     Row Name 04/14/20 1358          Physical Findings    Gastrointestinal  other (see comments) WDL PER NURSING DOCUMENTATION     Skin  other (see comments) WDL PER NURSING DOCUMENTATION                 Nutrition Prescription Ordered     Row Name 04/14/20 1358          Nutrition Prescription PO    Current PO Diet  Regular     Common Modifiers  Cardiac;Consistent Carbohydrate         Evaluation of Received Nutrient/Fluid Intake     Row Name 04/14/20 1358          PO Evaluation    Number of Days PO Intake Evaluated  Insufficient Data NO MEALS RECORDED YET               Problem/Interventions:  Problem 1     Row Name 04/14/20 1359          Nutrition Diagnoses Problem 1    Problem 1  Predicted Suboptimal Intake     Etiology (related to)  Factors Affecting Nutrition;Other (comment) MOST LIKELY COMBINED WITH METASTATIC CA PROGRESSION, PLEURAL EFFUSION & PAIN     Appetite  Poor     Signs/Symptoms (evidenced by)  Other (comment);Report of Mnimal PO Intake HOSPICE CONSULTED               Intervention Goal     Row Name 04/14/20 1400          Intervention Goal    General  Nutrition support treatment;Other (comment) PENDING [facility]     PO  Establish PO;Tolerate PO;Increase intake         Nutrition Intervention     Row Name 04/14/20 1401          Nutrition Intervention    RD/Tech Action  Interview for preference;Menu adjusted;Recommend/ordered;Care plan reviewd;Follow Tx progress;Other (comment) WILL PROVIDE ORDERS TO OBTAIN A STANDING SCALE WEIGHT POST THORACENTESIS WHEN PT ABLE TO STAND     Recommended/Ordered  Supplement         Nutrition  Prescription     Row Name 04/14/20 1401          Nutrition Prescription PO    PO Prescription  Begin/change supplement     Supplement  Boost Plus;Milk     Supplement Frequency  3 times a day     New PO Prescription Ordered?  Yes         Education/Evaluation     Row Name 04/14/20 1402          Monitor/Evaluation    Monitor  Per protocol;I&O;PO intake;Supplement intake;Pertinent labs;Weight;Skin status;Symptoms           Electronically signed by:  Viola Watts RD  04/14/20 14:02

## 2020-04-14 NOTE — PROGRESS NOTES
[facility] Medicine Services  ADMISSION FOLLOW-UP NOTE          Patient admitted after midnight, H&P by my partner performed earlier on today's date reviewed.  Interim findings, labs, and charting also reviewed.        The [facility] Hospital Problem List has been managed and updated to include any new diagnoses:  Active Hospital Problems    Diagnosis  POA   • Pleural effusion, bilateral [J90]  Yes   • Acute and chronic respiratory failure with hypoxia (CMS/HCC) [J96.21]  Yes   • Encephalopathy, metabolic [G93.41]  Yes   • Anemia due to chemotherapy [D64.81, T45.1X5A]  Yes   • Dyspnea [R06.00]  Yes   • Extensive stage small cell lung ca (RUL, bone & liver mets) - S/p Palliative Chemo x 3 cycles [C34.11]  Yes   • Transaminitis [R74.0]  Yes   • Uncontrolled type 2 diabetes mellitus (CMS/HCC) [E11.65]  Yes      Resolved Hospital Problems   No resolved problems to display.         ADDITIONAL PLAN:  - detailed assessment and plan form admission reviewed    H&P reviewed by my partner Dr Orantes on this same date. In sum, Mr Portillo is a 69yoM with extensive stage small cell lung Ca dx'd Oct 4, 2019 w/ now progressive lung, liver, bone involvement. Patient is followed by Dr Ren and is currently undergoing active chemo (Taxol). Patient presented with intermittent confusion as well as acute on chronic hypoxic respiratory failure. Imaging noted to have worsening b/l pleural effusions with ? PNA. Patient currently admitted to  for covid r/o, in house testing sent 4/14 and pending.     Patient has now ruled out for covid. Have had multiple discussions throughout the day with hospice team, patient and patient's wife. Patient is intermittently confused and not able to make medical decisions at this time, thus have spent significant time talking with patient's wife regarding his goals. Overall, patient and family are favoring a hospice approach and wish to return home with hospice asap. Have talked with  hospice and planning to transition home as soon as tomorrow when the equipment will be delivered. Did offer patient and wife the possibility to pleurix drain on the left given that he does have evidence of pleural effusion on imaging. They are amenable to this, and have ordered for this procedure in radiology. Holding eliquis at this time.     Will transfer patient to 5B and off 6A covid r/o unit.       Electronically signed by Josefina Tom MD, 04/14/20, 8:05 AM.

## 2020-04-14 NOTE — ED PROVIDER NOTES
Subjective   Mr. Calin Portillo is a 69 y.o. male who presents to the ED with c/o shortness of breath. He reports this has been ongoing for the past 3 days. He complains of associated weakness which has also been worsening over the past 3 days. He denies fever, cough, chest pain, abdominal pain, or urinary symptoms. He is currently undergoing chemotherapy for lung cancer and his oncologist is Dr. Ren. He is on Eliquis and reports he is taking it as prescribed. He is on 3 L of home oxygen. He denies history of DVT or PE. There are no other acute symptoms at this time.      History provided by:  Patient  Shortness of Breath   Severity:  Moderate  Onset quality:  Gradual  Duration:  3 days  Timing:  Constant  Progression:  Worsening  Chronicity:  Recurrent  Relieved by:  Nothing  Worsened by:  Nothing  Associated symptoms: no abdominal pain, no chest pain, no cough and no fever        Review of Systems   Constitutional: Negative for fever.   Respiratory: Positive for shortness of breath. Negative for cough.    Cardiovascular: Negative for chest pain.   Gastrointestinal: Negative for abdominal pain.   Genitourinary: Negative for dysuria, frequency and urgency.   All other systems reviewed and are negative.      Past Medical History:   Diagnosis Date   • Back pain    • Cancer (CMS/HCC)    • Coronary artery disease    • Depression    • Diabetes (CMS/HCC)    • Hep C w/o coma, chronic (CMS/HCC)     RESOLVED s/p TX   • Hypertension    • Mass        No Known Allergies    Past Surgical History:   Procedure Laterality Date   • BRONCHOSCOPY N/A 10/4/2019    Procedure: BRONCHOSCOPY WITH ENDOBRONCHIAL ULTRASOUND;  Surgeon: Michael Casey DO;  [facility];  Service: Pulmonary   • COLONOSCOPY     • CORONARY ANGIOPLASTY WITH STENT PLACEMENT  1997   • INTERVENTIONAL RADIOLOGY PROCEDURE Bilateral 11/15/2019    Procedure: CAROTID CEREBRAL ANGIOGRAM BILATERAL;  Surgeon: Mynor Mendez MD;   Location:  [facility];  Service: Interventional Radiology   • KNEE SURGERY     • PLEURAL CATHETER INSERTION N/A 10/10/2019    Procedure: PLEURX CATHETER INSERTION;  Surgeon: Chema Sandoval MD;  Location:  [facility] END[facility];  Service: Cardiothoracic   • SKULL FRACTURE ELEVATION  1959   • VENOUS ACCESS DEVICE (PORT) INSERTION N/A 10/9/2019    Procedure: INSERTION VENOUS ACCESS DEVICE;  Surgeon: Chema Sandoval MD;  Location: [facility] HYBRID OR 15;  Service: Cardiothoracic       Family History   Problem Relation Age of Onset   • No Known Problems Mother    • No Known Problems Father        Social History     Socioeconomic History   • Marital status:      Spouse name: Not on file   • Number of children: 2   • Years of education: Not on file   • Highest education level: Not on file   Occupational History   • Occupation:      Employer: RETIRED   Tobacco Use   • Smoking status: Never Smoker   • Smokeless tobacco: Never Used   Substance and Sexual Activity   • Alcohol use: No     Frequency: Never   • Drug use: No   • Sexual activity: Defer   Social History Narrative    Lives in [place], [place] with spouse and children         Objective   Physical Exam   Constitutional: He is oriented to person, place, and time. He appears well-developed and well-nourished. No distress.   Non-toxic and in no acute distress.   HENT:   Head: Normocephalic and atraumatic.   Nose: Nose normal.   Eyes: Conjunctivae are normal. No scleral icterus.   Neck: Normal range of motion. Neck supple.   Cardiovascular: Regular rhythm and normal heart sounds.   No murmur heard.  Tachycardic   Pulmonary/Chest: Effort normal and breath sounds normal. No respiratory distress.   Clear diffusely.   Abdominal: Soft. There is no tenderness.   Soft and non-tender diffusely.   Musculoskeletal: Normal range of motion. He exhibits no edema.   Equal calf sizes.   Neurological: He is alert and oriented to person, place, and time.   Skin:  Skin is warm and dry.   Psychiatric: He has a normal mood and affect. His behavior is normal.   Nursing note and vitals reviewed.      Procedures         ED Course  ED Course as of Apr 14 0155 Tue Apr 14, 2020   0129 SpO2: 90 % [NS]      ED Course User Index  [NS] Jose Carlos Villa MD     Recent Results (from the past 24 hour(s))   Comprehensive Metabolic Panel    Collection Time: 04/13/20 11:42 PM   Result Value Ref Range    Glucose 248 (H) 65 - 99 mg/dL    BUN 19 8 - 23 mg/dL    Creatinine 0.74 (L) 0.76 - 1.27 mg/dL    Sodium 138 136 - 145 mmol/L    Potassium 4.3 3.5 - 5.2 mmol/L    Chloride 98 98 - 107 mmol/L    CO2 26.0 22.0 - 29.0 mmol/L    Calcium 8.9 8.6 - 10.5 mg/dL    Total Protein 6.3 6.0 - 8.5 g/dL    Albumin 3.10 (L) 3.50 - 5.20 g/dL    ALT (SGPT) 48 (H) 1 - 41 U/L    AST (SGOT) 75 (H) 1 - 40 U/L    Alkaline Phosphatase 409 (H) 39 - 117 U/L    Total Bilirubin 0.6 0.2 - 1.2 mg/dL    eGFR Non African Amer 105 >60 mL/min/1.73    Globulin 3.2 gm/dL    A/G Ratio 1.0 g/dL    BUN/Creatinine Ratio 25.7 (H) 7.0 - 25.0    Anion Gap 14.0 5.0 - 15.0 mmol/L   BNP    Collection Time: 04/13/20 11:42 PM   Result Value Ref Range    proBNP 847.6 5.0 - 900.0 pg/mL   Troponin    Collection Time: 04/13/20 11:42 PM   Result Value Ref Range    Troponin T <0.010 0.000 - 0.030 ng/mL   Light Blue Top    Collection Time: 04/13/20 11:42 PM   Result Value Ref Range    Extra Tube hold for add-on    Green Top (Gel)    Collection Time: 04/13/20 11:42 PM   Result Value Ref Range    Extra Tube Hold for add-ons.    Lavender Top    Collection Time: 04/13/20 11:42 PM   Result Value Ref Range    Extra Tube hold for add-on    Gold Top - SST    Collection Time: 04/13/20 11:42 PM   Result Value Ref Range    Extra Tube Hold for add-ons.    CBC Auto Differential    Collection Time: 04/13/20 11:42 PM   Result Value Ref Range    WBC 6.58 3.40 - 10.80 10*3/mm3    RBC 2.57 (L) 4.14 - 5.80 10*6/mm3    Hemoglobin 7.9 (L) 13.0 - 17.7 g/dL     Hematocrit 26.0 (L) 37.5 - 51.0 %    .2 (H) 79.0 - 97.0 fL    MCH 30.7 26.6 - 33.0 pg    MCHC 30.4 (L) 31.5 - 35.7 g/dL    RDW 17.6 (H) 12.3 - 15.4 %    RDW-SD 64.0 (H) 37.0 - 54.0 fl    MPV 11.3 6.0 - 12.0 fL    Platelets 194 140 - 450 10*3/mm3    Neutrophil % 88.7 (H) 42.7 - 76.0 %    Lymphocyte % 7.9 (L) 19.6 - 45.3 %    Monocyte % 2.0 (L) 5.0 - 12.0 %    Eosinophil % 0.6 0.3 - 6.2 %    Basophil % 0.3 0.0 - 1.5 %    Immature Grans % 0.5 0.0 - 0.5 %    Neutrophils, Absolute 5.84 1.70 - 7.00 10*3/mm3    Lymphocytes, Absolute 0.52 (L) 0.70 - 3.10 10*3/mm3    Monocytes, Absolute 0.13 0.10 - 0.90 10*3/mm3    Eosinophils, Absolute 0.04 0.00 - 0.40 10*3/mm3    Basophils, Absolute 0.02 0.00 - 0.20 10*3/mm3    Immature Grans, Absolute 0.03 0.00 - 0.05 10*3/mm3    nRBC 0.3 (H) 0.0 - 0.2 /100 WBC   Lactic Acid, Plasma    Collection Time: 04/13/20 11:42 PM   Result Value Ref Range    Lactate 1.8 0.5 - 2.0 mmol/L     Note: In addition to lab results from this visit, the labs listed above may include labs taken at another facility or during a different encounter within the last 24 hours. Please correlate lab times with ED admission and discharge times for further clarification of the services performed during this visit.    CT Angiogram Chest   Final Result      1. No pulmonary embolism or aortic dissection.   2. New left pleural effusion with a worsened loculated right pleural effusion.   3. Worsening metastatic disease to the chest including mediastinal and hilar adenopathy as well as pleural-based metastatic disease.   4. Consolidations in the lungs, much of which is due to compressive atelectasis. Groundglass alveolar opacities are noted in the right lung suggesting pneumonia.  Imaging features are atypical or uncommonly reported for COVID-19 pneumonia. Alternative   diagnoses should be considered.   5. Widespread osseous metastatic disease, similar to prior.   6. Metastatic disease in the upper abdomen,  incompletely characterized.   7. Bronchitis in the right hemithorax versus potentially some metastatic disease along the bronchovascular bundles.      Signer Name: Leonardo Louis MD    Signed: 4/14/2020 1:16 AM    Workstation Name: [email or web address]     [facility]        Vitals:    04/14/20 0040 04/14/20 0041 04/14/20 0128 04/14/20 0150   BP: 127/76      BP Location:       Patient Position:       Pulse:   94 91   Resp:       Temp:       TempSrc:       SpO2:  97% 96% 100%   Weight:       Height:         Medications   sodium chloride 0.9 % flush 10 mL (has no administration in time range)   cefTRIAXone (ROCEPHIN) 2 g/100 mL 0.9% NS VTB (BENNIE) (has no administration in time range)   doxycycline (VIBRAMYCIN) 100 mg/100 mL 0.9% NS MBP (has no administration in time range)   sodium chloride 0.9 % bolus 1,000 mL (1,000 mL Intravenous New Bag 4/14/20 0040)   iopamidol (ISOVUE-370) 76 % injection 100 mL (50 mL Intravenous Given 4/14/20 0033)     ECG/EMG Results (last 24 hours)     Procedure Component Value Units Date/Time    ECG 12 Lead [[id number]] Collected:  04/13/20 2303     Updated:  04/13/20 2304        ECG 12 Lead                                                    MDM  Number of Diagnoses or Management Options  Consolidation of right lower lobe of lung (CMS/HCC): new and requires workup  Dyspnea, unspecified type: new and requires workup  Hypoxia: new and requires workup  Pleural effusion, bilateral: new and requires workup  Diagnosis management comments: 69-year-old male with a known history of metastatic lung cancer who currently receiving chemotherapy.  He presents with a complaint of increasing shortness of breath and fatigue for the last 3 days.  He denies any fever.    Lungs show crackles at the bases bilaterally with decreased breath sounds.    The patient reports a previous history of DVT and is prescribed Eliquis, but he does admit to occasionally missing doses.    CT angiogram of the chest  shows a multiloculated right pleural effusion and new left pleural effusion.  There are also multiple areas of new metastatic lung cancer.    There is reported consolidation in the right lung that may represent pneumonia.    The patient's oxygen saturations were identified to go down to 88% on room 3 L nasal cannula at times.  It is questionable whether this represents hypoxia or poor reading.    Regardless given the patient's CT scan findings I feel like admission for further treatment and observation is warranted.    Blood cultures will be obtained and antibiotics initiated.    I discussed the patient with the hospitalist, , who will admit.       Amount and/or Complexity of Data Reviewed  Clinical lab tests: ordered and reviewed  Tests in the radiology section of CPT®: ordered and reviewed  Decide to obtain previous medical records or to obtain history from someone other than the patient: yes  Review and summarize past medical records: yes  Discuss the patient with other providers: yes  Independent visualization of images, tracings, or specimens: yes        Final diagnoses:   Dyspnea, unspecified type   Pleural effusion, bilateral   Consolidation of right lower lobe of lung (CMS/HCC)   Hypoxia       Documentation assistance provided by katie Greer.  Information recorded by the katie was done at my direction and has been verified and validated by me.     Claudio Greer  04/13/20 4601       Claudio Greer  04/13/20 [id number]       Jose Carlos Villa MD  04/14/20 0155

## 2020-04-14 NOTE — DISCHARGE PLACEMENT REQUEST
"Contreras, Calin Ballard (69 y.o. Male)   Referred 4/14/2020 by Dr. Josefina Tom  PCP-Dr. SANDER Alcantara  Dx-Small cell lung cancer with mets to the liver and bone    Date of Birth Social Security Number Address Home Phone MRN    1950  [id number] [facility] [id number] [phone] [id number]    Nondenominational Marital Status          Amish        Admission Date Admission Type Admitting Provider Attending Provider Department, Room/Bed    4/13/20 Emergency Josefina Tom MD Hunter, Sarah M, MD [facility] 6A, N618/1    Discharge Date Discharge Disposition Discharge Destination                       Attending Provider:  Josefina Tom MD    Allergies:  No Known Allergies    Isolation:  Contact Air   Infection:  None   Code Status:  CPR    Ht:  180.3 cm (71\")   Wt:  70.2 kg (154 lb 12.2 oz)    Admission Cmt:  None   Principal Problem:  None                Active Insurance as of 4/13/2020     Primary Coverage     Payor Plan Insurance Group Employer/Plan Group    MEDICARE MEDICARE A & B      Payor Plan Address Payor Plan Phone Number Payor Plan Fax Number Effective Dates    [address] [phone]  4/1/2016 - None Entered    [facility] [id number]       Subscriber Name Subscriber Birth Date Member ID       CALIN CONTRERAS 1950 [id number]           Secondary Coverage     Payor Plan Insurance Group Employer/Plan Group    AETNA COMMERCIAL AETNA  MC SUPP      Payor Plan Address Payor Plan Phone Number Payor Plan Fax Number Effective Dates    [address] [phone]  9/1/2016 - None Entered    [facility] [address]       Subscriber Name Subscriber Birth Date Member ID       CALIN CONTRERAS 1950 [id number]                 Emergency Contacts      (Rel.) Home Phone Work Phone Mobile Phone    Kacey Contreras (Spouse) [phone] -- [phone]            Insurance Information                MEDICARE/MEDICARE A & B Phone: [phone]    Subscriber: Calin Contreras " Subscriber#: [id number]    Group#:  Precert#:         AETNA COMMERCIAL/AETOMAS   SUPP Phone: [phone]    Subscriber: Calin Portillo Subscriber#: [id number]    Group#:  Precert#:           Problem List           Codes Noted - Resolved       Hospital    Pleural effusion, bilateral ICD-10-CM: J90  ICD-9-CM: 511.9 4/14/2020 - Present    Acute and chronic respiratory failure with hypoxia (CMS/HCC) ICD-10-CM: J96.21  ICD-9-CM: 518.84, 799.02 4/14/2020 - Present    Encephalopathy, metabolic ICD-10-CM: G93.41  ICD-9-CM: 348.31 4/14/2020 - Present    Anemia due to chemotherapy ICD-10-CM: D64.81, T45.1X5A  ICD-9-CM: 285.3, E933.1 4/14/2020 - Present    Dyspnea ICD-10-CM: R06.00  ICD-9-CM: 786.09 4/14/2020 - Present    Extensive stage small cell lung ca (RUL, bone & liver mets) - S/p Palliative Chemo x 3 cycles ICD-10-CM: C34.11  ICD-9-CM: 162.3 10/8/2019 - Present    Transaminitis ICD-10-CM: R74.0  ICD-9-CM: 790.4 10/4/2019 - Present    Uncontrolled type 2 diabetes mellitus (CMS/HCC) ICD-10-CM: E11.65  ICD-9-CM: 250.02 6/15/2016 - Present       Non-Hospital    AIS L MCA s/p tPA + thrombectomy ICD-10-CM: I63.512  ICD-9-CM: 434.91 11/15/2019 - Present    Recurrent right pleural effusion (PleurX catheter) ICD-10-CM: J91.0  ICD-9-CM: 511.81 11/15/2019 - Present    Encounter for care related to vascular access port ICD-10-CM: Z45.2  ICD-9-CM: V58.81 10/28/2019 - Present    Lung mass ICD-10-CM: R91.8  ICD-9-CM: 786.6 10/4/2019 - Present    Liver mass ICD-10-CM: R16.0  ICD-9-CM: 573.8 10/4/2019 - Present    Leukocytosis ICD-10-CM: D72.829  ICD-9-CM: 288.60 10/4/2019 - Present    Hilar mass, right ICD-10-CM: R91.8  ICD-9-CM: 786.6 10/4/2019 - Present    HCV (S/P interferon) ICD-10-CM: R76.8  ICD-9-CM: 795.79 10/4/2019 - Present    Abnormal lung sounds ICD-10-CM: R09.89  ICD-9-CM: 786.7 8/16/2019 - Present    Right shoulder pain ICD-10-CM: M25.511  ICD-9-CM: 719.41 9/14/2016 - Present    Gastroesophageal reflux disease with  "esophagitis ICD-10-CM: K21.0  ICD-9-CM: 530.11 2016 - Present    Chronic coronary artery disease ICD-10-CM: I25.10  ICD-9-CM: 414.00 6/15/2016 - Present    Chronic pain syndrome ICD-10-CM: G89.4  ICD-9-CM: 338.4 6/15/2016 - Present    Depression ICD-10-CM: F32.9  ICD-9-CM: 311 6/15/2016 - Present    Hyperlipidemia ICD-10-CM: E78.5  ICD-9-CM: 272.4 6/15/2016 - Present    Chronic neck and back pain ICD-10-CM: M54.2, M54.9, G89.29  ICD-9-CM: 723.1, 724.5, 338.29 6/15/2016 - Present             History & Physical      Day, Fabiola DOYLE MD at [facility]              [facility] Medicine Services  HISTORY AND PHYSICAL    Patient Name: Calin Portillo  : 1950  MRN: [id number]  Primary Care Physician: Rakesh Alcantara MD  Date of admission: 2020      Subjective   Subjective     Chief Complaint:  Shortness of breath    HPI:  Calin Portillo is a 69 y.o. male with hx of extensive stage small cell lung Ca dx'd Oct 4, 2019 w/ now progressive lung, liver, bone involvement.  Pt now on Taxol per Dr. Ren and last tx was this past Friday.  He presents now w/ shortness of breath which has worsened reportedly over last 3 days.  Pt is somewhat confused on my exam and keeps telling me he \"can't get saturated\".  Denies cough, f/c, cp, abd pain, n/v.  When asked how much oxygen he is using pt states \"as much as I can get\".      Pt was last admitted here - with pneumonia.    Review of Systems     All other systems reviewed and are negative.     Personal History     Past Medical History:   Diagnosis Date   • Back pain    • Cancer (CMS/HCC)    • Coronary artery disease    • Depression    • Diabetes (CMS/HCC)    • Hep C w/o coma, chronic (CMS/HCC)     RESOLVED s/p TX   • Hypertension    • Mass        Past Surgical History:   Procedure Laterality Date   • BRONCHOSCOPY N/A 10/4/2019    Procedure: BRONCHOSCOPY WITH ENDOBRONCHIAL ULTRASOUND;  Surgeon: Michael Casey DO;  " Location:  [facility];  Service: Pulmonary   • COLONOSCOPY     • CORONARY ANGIOPLASTY WITH STENT PLACEMENT  1997   • INTERVENTIONAL RADIOLOGY PROCEDURE Bilateral 11/15/2019    Procedure: CAROTID CEREBRAL ANGIOGRAM BILATERAL;  Surgeon: Mynor Mendez MD;  Location:  [facility];  Service: Interventional Radiology   • KNEE SURGERY     • PLEURAL CATHETER INSERTION N/A 10/10/2019    Procedure: PLEURX CATHETER INSERTION;  Surgeon: Chema Sandoval MD;  Location:  [facility];  Service: Cardiothoracic   • SKULL FRACTURE ELEVATION  1959   • VENOUS ACCESS DEVICE (PORT) INSERTION N/A 10/9/2019    Procedure: INSERTION VENOUS ACCESS DEVICE;  Surgeon: Chema Sandoval MD;  Location:  [facility]BRID [facility] 15;  Service: Cardiothoracic       Family History: family history includes No Known Problems in his father and mother. Otherwise pertinent FHx was reviewed and unremarkable.     Social History:  reports that he has never smoked. He has never used smokeless tobacco. He reports that he does not drink alcohol or use drugs.  Social History     Social History Narrative    Lives in [place], [place] with spouse and children       Medications:  Available home medication information reviewed.  Medications Prior to Admission   Medication Sig Dispense Refill Last Dose   • apixaban (ELIQUIS) 5 MG tablet tablet Take 1 tablet by mouth 2 (Two) Times a Day. 60 tablet 5 Taking   • aspirin 81 MG EC tablet Take 1 tablet by mouth Daily.   Taking   • atorvastatin (LIPITOR) 80 MG tablet Take 1 tablet by mouth Every Night. 90 tablet 3 Taking   • docusate sodium (COLACE) 100 MG capsule Take 2 capsules by mouth 2 (Two) Times a Day As Needed for Constipation. 120 capsule 3 Taking   • dronabinol (Marinol) 2.5 MG capsule Take 1 capsule by mouth 2 (Two) Times a Day Before Meals. 60 capsule 0    • Empagliflozin-linaGLIPtin (GLYXAMBI) 25-5 MG tablet Take 0.5 tablets by mouth Every Morning.   Taking   • fluticasone (FLONASE) 50  MCG/ACT nasal spray 1 spray into the nostril(s) as directed by provider 2 (Two) Times a Day. 3 bottle 3 Taking   • Fluticasone-Umeclidin-Vilant (TRELEGY ELLIPTA) 100-62.5-25 MCG/INH aerosol powder  Inhale 1 each Every Morning. 28 each 0 Taking   • gabapentin (NEURONTIN) 300 MG capsule Take 300 mg by mouth 3 (Three) Times a Day.   Taking   • lidocaine-prilocaine (EMLA) 2.5-2.5 % cream Apply  topically to the appropriate area as directed As Needed (45-60 minutes prior to port access.  Cover with saran/plastic wrap.). 30 g 3 Taking   • LORazepam (ATIVAN) 1 MG tablet Take 1 tablet by mouth Every 8 (Eight) Hours As Needed for Anxiety. 90 tablet 2 Taking   • metFORMIN ER (GLUCOPHAGE-XR) 500 MG 24 hr tablet Take 2 tablets by mouth 2 (Two) Times a Day. 360 tablet 3 Taking   • mirtazapine (REMERON) 15 MG tablet Take 1 tablet by mouth Every Night. For appetite 90 tablet 3 Taking   • Morphine (MS CONTIN) 15 MG 12 hr tablet Take 3 tablets by mouth 2 (Two) Times a Day. 180 tablet 0    • nateglinide (STARLIX) 120 MG tablet Take 1 tablet by mouth 3 (Three) Times a Day Before Meals. 270 tablet 3    • ondansetron (ZOFRAN) 8 MG tablet Take 1 tablet by mouth Every 8 (Eight) Hours As Needed for Nausea for up to 60 doses. 30 tablet 5 Taking   • oxyCODONE (ROXICODONE) 10 MG tablet Take 1 tablet by mouth 4 (Four) Times a Day Before Meals & at Bedtime. 120 tablet 0    • zolpidem (AMBIEN) 10 MG tablet Take 1 tablet by mouth At Night As Needed for Sleep. 30 tablet 2 Taking       No Known Allergies    Objective   Objective     Vital Signs:   Temp:  [98.9 °F (37.2 °C)] 98.9 °F (37.2 °C)  Heart Rate:  [] 91  Resp:  [26] 26  BP: (124-127)/(76) 127/76        Physical Exam   Gen; alert, oriented to place and time (slow to respond); confused  Heent; pasty mm, perrla, eomi  Cv; rr w/ tachycardia  L; diminished bs's bilaterally; no tachypnea, no wheeze  Abd; firm but nontender, nondistended, no r/g  Ext; no cce, 2+ pulses  Skin; cdi, warm; port  in place w/ no erythema  Neuro; grossly intact; maee  Psych; pleasantly confused    Results Reviewed:  I have personally reviewed current lab and radiology data.    Results from last 7 days   Lab Units 04/13/20  2342   WBC 10*3/mm3 6.58   HEMOGLOBIN g/dL 7.9*   HEMATOCRIT % 26.0*   PLATELETS 10*3/mm3 194     Results from last 7 days   Lab Units 04/13/20  2342   SODIUM mmol/L 138   POTASSIUM mmol/L 4.3   CHLORIDE mmol/L 98   CO2 mmol/L 26.0   BUN mg/dL 19   CREATININE mg/dL 0.74*   GLUCOSE mg/dL 248*   CALCIUM mg/dL 8.9   ALT (SGPT) U/L 48*   AST (SGOT) U/L 75*   TROPONIN T ng/mL <0.010   PROBNP pg/mL 847.6   LACTATE mmol/L 1.8     Estimated Creatinine Clearance: 79.4 mL/min (A) (by C-G formula based on SCr of 0.74 mg/dL (L)).  Brief Urine Lab Results  (Last result in the past 365 days)      Color   Clarity   Blood   Leuk Est   Nitrite   Protein   CREAT   Urine HCG        02/24/20 1716 Yellow Clear Negative Negative Negative 30 mg/dL (1+)             Imaging Results (Last 24 Hours)     Procedure Component Value Units Date/Time    CT Angiogram Chest [[id number]] Collected:  04/14/20 0116     Updated:  04/14/20 0118    Narrative:       CTA Chest    INDICATION:   Progressive dyspnea over the last 3 days. History of metastatic lung cancer status post chemotherapy.    TECHNIQUE:   CT angiogram of the chest with IV contrast. 3-D reconstructions were obtained and reviewed.   Radiation dose reduction techniques included automated exposure control or exposure modulation based on body size. Count of known CT and cardiac nuc med studies  performed in previous 12 months: 13.     COMPARISON:   2/24/2020    FINDINGS:   There is no pulmonary embolism or aortic dissection. There is a new small left pleural effusion. There is a large loculated right pleural effusion, worsened from prior. Left-sided Port-A-Cath remains in place. There is new precarinal adenopathy measuring  1.9 cm short Axis. Right paratracheal adenopathy measures up  to 2.8 cm, previously 2.4 cm. Subcarinal adenopathy now measures 3.0 cm in thickness, previously about 2.0 cm. Right hilar node now measures 1.7 cm, previously 1.4 cm. Anterior mediastinal node  now measures 1.8 cm, previously about 0.5 cm. Enlarged right epicardial lymph node now measures 2.0 cm, previously 1.7 cm. There is mild right axillary adenopathy. There is a pleural-based nodule in the right medial upper hemithorax abutting the thoracic  spine now measuring 1.0 cm, previously 0.5 cm.    Bronchial wall thickening in the right hemithorax suggests bronchitis, although some degree of tumor spread could have this appearance. There is some compressive atelectasis in the lower lobes. There is pulmonary emphysema. Calcified granuloma is noted  in the left lower lobe. In the right lung, there are scattered areas of alveolar consolidation with some groundglass infiltrates. Much of this may be due to atelectasis, but pneumonia could have this appearance. Imaging features are atypical or  uncommonly reported for COVID-19 pneumonia. Alternative diagnoses should be considered.    Widespread blastic metastatic disease is again seen, grossly stable. This would be better assessed with a bone scan when needed. Upper abdomen shows atherosclerotic disease. There is retroperitoneal adenopathy compatible with metastatic disease.  Additionally, poorly defined low density lesions in the liver are compatible with metastatic disease.      Impression:         1. No pulmonary embolism or aortic dissection.  2. New left pleural effusion with a worsened loculated right pleural effusion.  3. Worsening metastatic disease to the chest including mediastinal and hilar adenopathy as well as pleural-based metastatic disease.  4. Consolidations in the lungs, much of which is due to compressive atelectasis. Groundglass alveolar opacities are noted in the right lung suggesting pneumonia.  Imaging features are atypical or uncommonly reported for  COVID-19 pneumonia. Alternative  diagnoses should be considered.  5. Widespread osseous metastatic disease, similar to prior.  6. Metastatic disease in the upper abdomen, incompletely characterized.  7. Bronchitis in the right hemithorax versus potentially some metastatic disease along the bronchovascular bundles.    Signer Name: Leonardo Louis MD   Signed: 4/14/2020 1:16 AM   Workstation Name: [facility]    [facility]        Results for orders placed during the hospital encounter of 11/15/19   Adult Transthoracic Echo Complete W/ Cont if Necessary Per Protocol (With Agitated Saline)    Narrative · Left ventricular systolic function is normal. Estimated EF = 60%.  · Left ventricular diastolic dysfunction (grade I) consistent with   impaired relaxation.  · The cardiac valves are anatomically and functionally normal.  · Positive bubble study suggesting intrapulmonary shunt.          Assessment/Plan   Assessment & Plan     Active Hospital Problems    Diagnosis POA   • Pleural effusion, bilateral [J90] Yes   • Acute and chronic respiratory failure with hypoxia (CMS/HCC) [J96.21] Yes   • Encephalopathy, metabolic [G93.41] Yes   • Anemia due to chemotherapy [D64.81, T45.1X5A] Yes   • Extensive stage small cell lung ca (RUL, bone & liver mets) - S/p Palliative Chemo x 3 cycles [C34.11] Yes   • Transaminitis [R74.0] Yes   • Uncontrolled type 2 diabetes mellitus (CMS/HCC) [E11.65] Yes       COVID-19 RISK SCREEN     1. Has the patient had close contact without PPE with a lab confirmed COVID-19 (+) person or a person under investigation (PUI) for COVID-19 infection?  -- No     2. Has the patient had respiratory symptoms, worsened/new cough and/or SOA, unexplained fever, or sudden loss of smell and/or taste in the past 7 days? --  Yes    3. Does the patient have baseline higher exposure risk such as working in healthcare field, currently residing in healthcare facility, or ongoing hemodialysis?  --  No      68 y/o male w/ hx of extensive stage small cell lung ca w/ mets to bone and liver here w/  1. Acute on chronic hypoxic respiratory failure with;  A. Worsening bilateral pleural effusions; may need therapeutic thoracentesis but will defer consult while performing covid r/o  B. ?pneumonia; cont iv abx; hold nebs for now.  NO more ivf's (was given 1 L in ER as hypotensive).  In house COVID PCR sent.    C. Worsening lung lesions; can consider consult w/ [facility]  2. Metabolic encephalopathy;  -discussed w/ wife on phone who states pt has had periods of intermittent confusion.  He is on several pain meds.  Will check ammonia level as well.  ABG pending.  Does not have focal neuro deficits other than confusion so did not feel it was warranted at this time to obtain head CT while COVID pending.  Check UA as well.   3. Anemia;   -suspect this is related to chemotherapy.  Wife reports no known bleeding.  Pt is on eliquis for unclear reasons.    4. Transammanitis; likely related to liver lesions/?chemo.  5. Hyperglycemia w/ type 2 DM;   -SSI    *Discussed w/ wife at length his poor overall prognosis and code status.  Wife would like to have palliative care and hospice involved and would very much like to have pt at home if at all possible.  With regards to code status, wife states he would not want to live on a ventilator.  Discussed with wife that very low likelihood pt would be able to be extubated if ever intubated.  She is not yet ready to declare him dni/dnr but is reasonable and I feel would appreciate input from palliative/hospice as her main goal is for her/kids to be able to spend time with him.        DVT prophylaxis:  On eliquis    CODE STATUS:  See above  Code Status and Medical Interventions:   Ordered at: 04/14/20 0216     Code Status:    CPR     Medical Interventions (Level of Support Prior to Arrest):    Full       Admission Status:  I believe this patient meets INPATIENT status due to life threatening dz.  I  feel patient’s risk for adverse outcomes and need for care warrant INPATIENT evaluation and I predict the patient’s care encounter to likely last beyond 2 midnights.      Electronically signed by Fabiola Orantes MD, 04/14/20, 2:52 AM.  75 minutes of critical care provided. This time excludes other billable procedures. Time does include preparation of documents, medical consultations, review of old records, and direct bedside care. Patient was at high risk for life-threatening deterioration due to metastatic lung ca, pna, pleural effusions and ?covid.       Electronically signed by Fabiola Orantes MD at 04/14/20 0614          Emergency Department Notes      Jose Carlos Villa MD at 04/13/20 2316          Subjective   Mr. Calin Portillo is a 69 y.o. male who presents to the ED with c/o shortness of breath. He reports this has been ongoing for the past 3 days. He complains of associated weakness which has also been worsening over the past 3 days. He denies fever, cough, chest pain, abdominal pain, or urinary symptoms. He is currently undergoing chemotherapy for lung cancer and his oncologist is Dr. Ren. He is on Eliquis and reports he is taking it as prescribed. He is on 3 L of home oxygen. He denies history of DVT or PE. There are no other acute symptoms at this time.      History provided by:  Patient  Shortness of Breath   Severity:  Moderate  Onset quality:  Gradual  Duration:  3 days  Timing:  Constant  Progression:  Worsening  Chronicity:  Recurrent  Relieved by:  Nothing  Worsened by:  Nothing  Associated symptoms: no abdominal pain, no chest pain, no cough and no fever        Review of Systems   Constitutional: Negative for fever.   Respiratory: Positive for shortness of breath. Negative for cough.    Cardiovascular: Negative for chest pain.   Gastrointestinal: Negative for abdominal pain.   Genitourinary: Negative for dysuria, frequency and urgency.   All other systems reviewed and are  negative.      Past Medical History:   Diagnosis Date   • Back pain    • Cancer (CMS/HCC)    • Coronary artery disease    • Depression    • Diabetes (CMS/HCC)    • Hep C w/o coma, chronic (CMS/HCC)     RESOLVED s/p TX   • Hypertension    • Mass        No Known Allergies    Past Surgical History:   Procedure Laterality Date   • BRONCHOSCOPY N/A 10/4/2019    Procedure: BRONCHOSCOPY WITH ENDOBRONCHIAL ULTRASOUND;  Surgeon: Michael Casey DO;  Location:  [facility];  Service: Pulmonary   • COLONOSCOPY     • CORONARY ANGIOPLASTY WITH STENT PLACEMENT  1997   • INTERVENTIONAL RADIOLOGY PROCEDURE Bilateral 11/15/2019    Procedure: CAROTID CEREBRAL ANGIOGRAM BILATERAL;  Surgeon: Mynor Mendez MD;  Location: [facility] CATH INVASIVE LOCATION;  Service: Interventional Radiology   • KNEE SURGERY     • PLEURAL CATHETER INSERTION N/A 10/10/2019    Procedure: PLEURX CATHETER INSERTION;  Surgeon: Chema Sandoval MD;  Location:  [facility];  Service: Cardiothoracic   • SKULL FRACTURE ELEVATION  1959   • VENOUS ACCESS DEVICE (PORT) INSERTION N/A 10/9/2019    Procedure: INSERTION VENOUS ACCESS DEVICE;  Surgeon: Chema Sandoval MD;  Location: [facility] HYBRID OR 15;  Service: Cardiothoracic       Family History   Problem Relation Age of Onset   • No Known Problems Mother    • No Known Problems Father        Social History     Socioeconomic History   • Marital status:      Spouse name: Not on file   • Number of children: 2   • Years of education: Not on file   • Highest education level: Not on file   Occupational History   • Occupation:      Employer: RETIRED   Tobacco Use   • Smoking status: Never Smoker   • Smokeless tobacco: Never Used   Substance and Sexual Activity   • Alcohol use: No     Frequency: Never   • Drug use: No   • Sexual activity: Defer   Social History Narrative    Lives in [place], [place] with spouse and children         Objective   Physical Exam   Constitutional: He is  oriented to person, place, and time. He appears well-developed and well-nourished. No distress.   Non-toxic and in no acute distress.   HENT:   Head: Normocephalic and atraumatic.   Nose: Nose normal.   Eyes: Conjunctivae are normal. No scleral icterus.   Neck: Normal range of motion. Neck supple.   Cardiovascular: Regular rhythm and normal heart sounds.   No murmur heard.  Tachycardic   Pulmonary/Chest: Effort normal and breath sounds normal. No respiratory distress.   Clear diffusely.   Abdominal: Soft. There is no tenderness.   Soft and non-tender diffusely.   Musculoskeletal: Normal range of motion. He exhibits no edema.   Equal calf sizes.   Neurological: He is alert and oriented to person, place, and time.   Skin: Skin is warm and dry.   Psychiatric: He has a normal mood and affect. His behavior is normal.   Nursing note and vitals reviewed.      Procedures        ED Course  ED Course as of Apr 14 0155 Tue Apr 14, 2020 0129 SpO2: 90 % [NS]      ED Course User Index  [NS] Jose Carlos Villa MD     Recent Results (from the past 24 hour(s))   Comprehensive Metabolic Panel    Collection Time: 04/13/20 11:42 PM   Result Value Ref Range    Glucose 248 (H) 65 - 99 mg/dL    BUN 19 8 - 23 mg/dL    Creatinine 0.74 (L) 0.76 - 1.27 mg/dL    Sodium 138 136 - 145 mmol/L    Potassium 4.3 3.5 - 5.2 mmol/L    Chloride 98 98 - 107 mmol/L    CO2 26.0 22.0 - 29.0 mmol/L    Calcium 8.9 8.6 - 10.5 mg/dL    Total Protein 6.3 6.0 - 8.5 g/dL    Albumin 3.10 (L) 3.50 - 5.20 g/dL    ALT (SGPT) 48 (H) 1 - 41 U/L    AST (SGOT) 75 (H) 1 - 40 U/L    Alkaline Phosphatase 409 (H) 39 - 117 U/L    Total Bilirubin 0.6 0.2 - 1.2 mg/dL    eGFR Non African Amer 105 >60 mL/min/1.73    Globulin 3.2 gm/dL    A/G Ratio 1.0 g/dL    BUN/Creatinine Ratio 25.7 (H) 7.0 - 25.0    Anion Gap 14.0 5.0 - 15.0 mmol/L   BNP    Collection Time: 04/13/20 11:42 PM   Result Value Ref Range    proBNP 847.6 5.0 - 900.0 pg/mL   Troponin    Collection Time:  04/13/20 11:42 PM   Result Value Ref Range    Troponin T <0.010 0.000 - 0.030 ng/mL   Light Blue Top    Collection Time: 04/13/20 11:42 PM   Result Value Ref Range    Extra Tube hold for add-on    Green Top (Gel)    Collection Time: 04/13/20 11:42 PM   Result Value Ref Range    Extra Tube Hold for add-ons.    Lavender Top    Collection Time: 04/13/20 11:42 PM   Result Value Ref Range    Extra Tube hold for add-on    Gold Top - SST    Collection Time: 04/13/20 11:42 PM   Result Value Ref Range    Extra Tube Hold for add-ons.    CBC Auto Differential    Collection Time: 04/13/20 11:42 PM   Result Value Ref Range    WBC 6.58 3.40 - 10.80 10*3/mm3    RBC 2.57 (L) 4.14 - 5.80 10*6/mm3    Hemoglobin 7.9 (L) 13.0 - 17.7 g/dL    Hematocrit 26.0 (L) 37.5 - 51.0 %    .2 (H) 79.0 - 97.0 fL    MCH 30.7 26.6 - 33.0 pg    MCHC 30.4 (L) 31.5 - 35.7 g/dL    RDW 17.6 (H) 12.3 - 15.4 %    RDW-SD 64.0 (H) 37.0 - 54.0 fl    MPV 11.3 6.0 - 12.0 fL    Platelets 194 140 - 450 10*3/mm3    Neutrophil % 88.7 (H) 42.7 - 76.0 %    Lymphocyte % 7.9 (L) 19.6 - 45.3 %    Monocyte % 2.0 (L) 5.0 - 12.0 %    Eosinophil % 0.6 0.3 - 6.2 %    Basophil % 0.3 0.0 - 1.5 %    Immature Grans % 0.5 0.0 - 0.5 %    Neutrophils, Absolute 5.84 1.70 - 7.00 10*3/mm3    Lymphocytes, Absolute 0.52 (L) 0.70 - 3.10 10*3/mm3    Monocytes, Absolute 0.13 0.10 - 0.90 10*3/mm3    Eosinophils, Absolute 0.04 0.00 - 0.40 10*3/mm3    Basophils, Absolute 0.02 0.00 - 0.20 10*3/mm3    Immature Grans, Absolute 0.03 0.00 - 0.05 10*3/mm3    nRBC 0.3 (H) 0.0 - 0.2 /100 WBC   Lactic Acid, Plasma    Collection Time: 04/13/20 11:42 PM   Result Value Ref Range    Lactate 1.8 0.5 - 2.0 mmol/L     Note: In addition to lab results from this visit, the labs listed above may include labs taken at another facility or during a different encounter within the last 24 hours. Please correlate lab times with ED admission and discharge times for further clarification of the services performed  during this visit.    CT Angiogram Chest   Final Result      1. No pulmonary embolism or aortic dissection.   2. New left pleural effusion with a worsened loculated right pleural effusion.   3. Worsening metastatic disease to the chest including mediastinal and hilar adenopathy as well as pleural-based metastatic disease.   4. Consolidations in the lungs, much of which is due to compressive atelectasis. Groundglass alveolar opacities are noted in the right lung suggesting pneumonia.  Imaging features are atypical or uncommonly reported for COVID-19 pneumonia. Alternative   diagnoses should be considered.   5. Widespread osseous metastatic disease, similar to prior.   6. Metastatic disease in the upper abdomen, incompletely characterized.   7. Bronchitis in the right hemithorax versus potentially some metastatic disease along the bronchovascular bundles.      Signer Name: Leonardo Louis MD    Signed: 4/14/2020 1:16 AM    Workstation Name: [email or web address]     [facility]        Vitals:    04/14/20 0040 04/14/20 0041 04/14/20 0128 04/14/20 0150   BP: 127/76      BP Location:       Patient Position:       Pulse:   94 91   Resp:       Temp:       TempSrc:       SpO2:  97% 96% 100%   Weight:       Height:         Medications   sodium chloride 0.9 % flush 10 mL (has no administration in time range)   cefTRIAXone (ROCEPHIN) 2 g/100 mL 0.9% NS VTB (BENNIE) (has no administration in time range)   doxycycline (VIBRAMYCIN) 100 mg/100 mL 0.9% NS MBP (has no administration in time range)   sodium chloride 0.9 % bolus 1,000 mL (1,000 mL Intravenous New Bag 4/14/20 0040)   iopamidol (ISOVUE-370) 76 % injection 100 mL (50 mL Intravenous Given 4/14/20 0033)     ECG/EMG Results (last 24 hours)     Procedure Component Value Units Date/Time    ECG 12 Lead [[id number]] Collected:  04/13/20 2303     Updated:  04/13/20 2304        ECG 12 Lead                                                    MDM  Number of Diagnoses or  Management Options  Consolidation of right lower lobe of lung (CMS/HCC): new and requires workup  Dyspnea, unspecified type: new and requires workup  Hypoxia: new and requires workup  Pleural effusion, bilateral: new and requires workup  Diagnosis management comments: 69-year-old male with a known history of metastatic lung cancer who currently receiving chemotherapy.  He presents with a complaint of increasing shortness of breath and fatigue for the last 3 days.  He denies any fever.    Lungs show crackles at the bases bilaterally with decreased breath sounds.    The patient reports a previous history of DVT and is prescribed Eliquis, but he does admit to occasionally missing doses.    CT angiogram of the chest shows a multiloculated right pleural effusion and new left pleural effusion.  There are also multiple areas of new metastatic lung cancer.    There is reported consolidation in the right lung that may represent pneumonia.    The patient's oxygen saturations were identified to go down to 88% on room 3 L nasal cannula at times.  It is questionable whether this represents hypoxia or poor reading.    Regardless given the patient's CT scan findings I feel like admission for further treatment and observation is warranted.    Blood cultures will be obtained and antibiotics initiated.    I discussed the patient with the hospitalist, , who will admit.       Amount and/or Complexity of Data Reviewed  Clinical lab tests: ordered and reviewed  Tests in the radiology section of CPT®:  ordered and reviewed  Decide to obtain previous medical records or to obtain history from someone other than the patient: yes  Review and summarize past medical records: yes  Discuss the patient with other providers: yes  Independent visualization of images, tracings, or specimens: yes        Final diagnoses:   Dyspnea, unspecified type   Pleural effusion, bilateral   Consolidation of right lower lobe of lung (CMS/HCC)   Hypoxia        Documentation assistance provided by katie Greer.  Information recorded by the scribe was done at my direction and has been verified and validated by me.     Claudio Greer  04/13/20 7611       Claudio Greer  04/13/20 1290       Jose Carlos Villa MD  04/14/20 0155      Electronically signed by Jose Carlos Villa MD at 04/14/20 0155         Current Facility-Administered Medications   Medication Dose Route Frequency Provider Last Rate Last Dose   • acetaminophen (TYLENOL) tablet 650 mg  650 mg Oral Q4H PRN Marian Sierra APRN        Or   • acetaminophen (TYLENOL) 160 MG/5ML solution 650 mg  650 mg Oral Q4H PRN Marian Sierra APRN        Or   • acetaminophen (TYLENOL) suppository 650 mg  650 mg Rectal Q4H PRN Marian Sierra APRN       • apixaban (ELIQUIS) tablet 5 mg  5 mg Oral BID Marian Sierra APRN   5 mg at 04/14/20 0854   • aspirin EC tablet 81 mg  81 mg Oral Daily Marian Sierra APRN   81 mg at 04/14/20 0854   • atorvastatin (LIPITOR) tablet 80 mg  80 mg Oral Nightly Marian Sierra APRN       • cefTRIAXone (ROCEPHIN) 1 g/100 mL 0.9% NS (MBP)  1 g Intravenous Q24H Marian Sierra APRN       • dextrose (D50W) 25 g/ 50mL Intravenous Solution 25 g  25 g Intravenous Q15 Min PRN Marian Sierra APRN       • dextrose (GLUTOSE) oral gel 15 g  15 g Oral Q15 Min PRN Marian Sierra APRN       • docusate sodium (COLACE) capsule 200 mg  200 mg Oral BID PRN Marian Sierra APRN       • doxycycline (MONODOX) capsule 100 mg  100 mg Oral Q12H Marian Sierra APRN   100 mg at 04/14/20 1306   • dronabinol (MARINOL) capsule 2.5 mg  2.5 mg Oral BID AC Marian Sierra APRN   2.5 mg at 04/14/20 0639   • gabapentin (NEURONTIN) capsule 300 mg  300 mg Oral TID Marian Sierra APRN   300 mg at 04/14/20 0639   • glucagon (human recombinant) (GLUCAGEN DIAGNOSTIC) injection 1 mg  1 mg Subcutaneous Q15 Min PRN Marian Sierra, LILLIAN       • insulin lispro (humaLOG) injection 0-9  Units  0-9 Units Subcutaneous 4x Daily With Meals & Nightly Marian Sierra APRN   2 Units at 04/14/20 0853   • mirtazapine (REMERON) tablet 15 mg  15 mg Oral Nightly Marian Sierra APRN       • Morphine (MS CONTIN) 12 hr tablet 45 mg  45 mg Oral BID Marian Sierra APRN   45 mg at 04/14/20 0854   • ondansetron (ZOFRAN) tablet 8 mg  8 mg Oral Q8H PRN Marian Sierra APRN       • oxyCODONE (ROXICODONE) immediate release tablet 10 mg  10 mg Oral 4x Daily AC & at Bedtime Marian Sierra APRN   10 mg at 04/14/20 1305   • sodium chloride 0.9 % flush 10 mL  10 mL Intravenous PRN Jose Carlos Villa MD       • sodium chloride 0.9 % flush 10 mL  10 mL Intravenous Q12H Marian Sierra APRN   10 mL at 04/14/20 0853   • sodium chloride 0.9 % flush 10 mL  10 mL Intravenous PRN Marian Sierra APRDIOMEDES   10 mL at 04/14/20 0335     Physician Progress Notes (last 72 hours) (Notes from 04/11/20 1330 through 04/14/20 1330)    No notes of this type exist for this encounter.         Consult Notes (last 72 hours) (Notes from 04/11/20 1330 through 04/14/20 1330)    No notes of this type exist for this encounter.

## 2020-04-14 NOTE — H&P
"    [facility] Medicine Services  HISTORY AND PHYSICAL    Patient Name: Calin Portillo  : 1950  MRN: [id number]  Primary Care Physician: Rakesh Alcantara MD  Date of admission: 2020      Subjective   Subjective     Chief Complaint:  Shortness of breath    HPI:  Calin Portillo is a 69 y.o. male with hx of extensive stage small cell lung Ca dx'd Oct 4, 2019 w/ now progressive lung, liver, bone involvement.  Pt now on Taxol per Dr. Ren and last tx was this past Friday.  He presents now w/ shortness of breath which has worsened reportedly over last 3 days.  Pt is somewhat confused on my exam and keeps telling me he \"can't get saturated\".  Denies cough, f/c, cp, abd pain, n/v.  When asked how much oxygen he is using pt states \"as much as I can get\".      Pt was last admitted here - with pneumonia.    Review of Systems     All other systems reviewed and are negative.     Personal History     Past Medical History:   Diagnosis Date   • Back pain    • Cancer (CMS/HCC)    • Coronary artery disease    • Depression    • Diabetes (CMS/HCC)    • Hep C w/o coma, chronic (CMS/HCC)     RESOLVED s/p TX   • Hypertension    • Mass        Past Surgical History:   Procedure Laterality Date   • BRONCHOSCOPY N/A 10/4/2019    Procedure: BRONCHOSCOPY WITH ENDOBRONCHIAL ULTRASOUND;  Surgeon: Michael Casey DO;  Location:  [facility];  Service: Pulmonary   • COLONOSCOPY     • CORONARY ANGIOPLASTY WITH STENT PLACEMENT     • INTERVENTIONAL RADIOLOGY PROCEDURE Bilateral 11/15/2019    Procedure: CAROTID CEREBRAL ANGIOGRAM BILATERAL;  Surgeon: Mynor Mendez MD;  Location:  [facility] CATH INVASIVE LOCATION;  Service: Interventional Radiology   • KNEE SURGERY     • PLEURAL CATHETER INSERTION N/A 10/10/2019    Procedure: PLEURX CATHETER INSERTION;  Surgeon: Chema Sandoval MD;  Location:  [facility];  Service: Cardiothoracic   • SKULL FRACTURE ELEVATION     "   • VENOUS ACCESS DEVICE (PORT) INSERTION N/A 10/9/2019    Procedure: INSERTION VENOUS ACCESS DEVICE;  Surgeon: Chema Sandoval MD;  Location: [place] [address];  Service: Cardiothoracic       Family History: family history includes No Known Problems in his father and mother. Otherwise pertinent FHx was reviewed and unremarkable.     Social History:  reports that he has never smoked. He has never used smokeless tobacco. He reports that he does not drink alcohol or use drugs.  Social History     Social History Narrative    Lives in [place], [place] with spouse and children       Medications:  Available home medication information reviewed.  Medications Prior to Admission   Medication Sig Dispense Refill Last Dose   • apixaban (ELIQUIS) 5 MG tablet tablet Take 1 tablet by mouth 2 (Two) Times a Day. 60 tablet 5 Taking   • aspirin 81 MG EC tablet Take 1 tablet by mouth Daily.   Taking   • atorvastatin (LIPITOR) 80 MG tablet Take 1 tablet by mouth Every Night. 90 tablet 3 Taking   • docusate sodium (COLACE) 100 MG capsule Take 2 capsules by mouth 2 (Two) Times a Day As Needed for Constipation. 120 capsule 3 Taking   • dronabinol (Marinol) 2.5 MG capsule Take 1 capsule by mouth 2 (Two) Times a Day Before Meals. 60 capsule 0    • Empagliflozin-linaGLIPtin (GLYXAMBI) 25-5 MG tablet Take 0.5 tablets by mouth Every Morning.   Taking   • fluticasone (FLONASE) 50 MCG/ACT nasal spray 1 spray into the nostril(s) as directed by provider 2 (Two) Times a Day. 3 bottle 3 Taking   • Fluticasone-Umeclidin-Vilant (TRELEGY ELLIPTA) 100-62.5-25 MCG/INH aerosol powder  Inhale 1 each Every Morning. 28 each 0 Taking   • gabapentin (NEURONTIN) 300 MG capsule Take 300 mg by mouth 3 (Three) Times a Day.   Taking   • lidocaine-prilocaine (EMLA) 2.5-2.5 % cream Apply  topically to the appropriate area as directed As Needed (45-60 minutes prior to port access.  Cover with saran/plastic wrap.). 30 g 3 Taking   • LORazepam (ATIVAN) 1 MG tablet Take 1  tablet by mouth Every 8 (Eight) Hours As Needed for Anxiety. 90 tablet 2 Taking   • metFORMIN ER (GLUCOPHAGE-XR) 500 MG 24 hr tablet Take 2 tablets by mouth 2 (Two) Times a Day. 360 tablet 3 Taking   • mirtazapine (REMERON) 15 MG tablet Take 1 tablet by mouth Every Night. For appetite 90 tablet 3 Taking   • Morphine (MS CONTIN) 15 MG 12 hr tablet Take 3 tablets by mouth 2 (Two) Times a Day. 180 tablet 0    • nateglinide (STARLIX) 120 MG tablet Take 1 tablet by mouth 3 (Three) Times a Day Before Meals. 270 tablet 3    • ondansetron (ZOFRAN) 8 MG tablet Take 1 tablet by mouth Every 8 (Eight) Hours As Needed for Nausea for up to 60 doses. 30 tablet 5 Taking   • oxyCODONE (ROXICODONE) 10 MG tablet Take 1 tablet by mouth 4 (Four) Times a Day Before Meals & at Bedtime. 120 tablet 0    • zolpidem (AMBIEN) 10 MG tablet Take 1 tablet by mouth At Night As Needed for Sleep. 30 tablet 2 Taking       No Known Allergies    Objective   Objective     Vital Signs:   Temp:  [98.9 °F (37.2 °C)] 98.9 °F (37.2 °C)  Heart Rate:  [] 91  Resp:  [26] 26  BP: (124-127)/(76) 127/76        Physical Exam   Gen; alert, oriented to place and time (slow to respond); confused  Heent; pasty mm, perrla, eomi  Cv; rr w/ tachycardia  L; diminished bs's bilaterally; no tachypnea, no wheeze  Abd; firm but nontender, nondistended, no r/g  Ext; no cce, 2+ pulses  Skin; cdi, warm; port in place w/ no erythema  Neuro; grossly intact; maee  Psych; pleasantly confused    Results Reviewed:  I have personally reviewed current lab and radiology data.    Results from last 7 days   Lab Units 04/13/20  2342   WBC 10*3/mm3 6.58   HEMOGLOBIN g/dL 7.9*   HEMATOCRIT % 26.0*   PLATELETS 10*3/mm3 194     Results from last 7 days   Lab Units 04/13/20  2342   SODIUM mmol/L 138   POTASSIUM mmol/L 4.3   CHLORIDE mmol/L 98   CO2 mmol/L 26.0   BUN mg/dL 19   CREATININE mg/dL 0.74*   GLUCOSE mg/dL 248*   CALCIUM mg/dL 8.9   ALT (SGPT) U/L 48*   AST (SGOT) U/L 75*      TROPONIN T ng/mL <0.010   PROBNP pg/mL 847.6   LACTATE mmol/L 1.8     Estimated Creatinine Clearance: 79.4 mL/min (A) (by C-G formula based on SCr of 0.74 mg/dL (L)).  Brief Urine Lab Results  (Last result in the past 365 days)      Color   Clarity   Blood   Leuk Est   Nitrite   Protein   CREAT   Urine HCG        02/24/20 1716 Yellow Clear Negative Negative Negative 30 mg/dL (1+)             Imaging Results (Last 24 Hours)     Procedure Component Value Units Date/Time    CT Angiogram Chest [[id number]] Collected:  04/14/20 0116     Updated:  04/14/20 0118    Narrative:       CTA Chest    INDICATION:   Progressive dyspnea over the last 3 days. History of metastatic lung cancer status post chemotherapy.    TECHNIQUE:   CT angiogram of the chest with IV contrast. 3-D reconstructions were obtained and reviewed.   Radiation dose reduction techniques included automated exposure control or exposure modulation based on body size. Count of known CT and cardiac nuc med studies  performed in previous 12 months: 13.     COMPARISON:   2/24/2020    FINDINGS:   There is no pulmonary embolism or aortic dissection. There is a new small left pleural effusion. There is a large loculated right pleural effusion, worsened from prior. Left-sided Port-A-Cath remains in place. There is new precarinal adenopathy measuring  1.9 cm short Axis. Right paratracheal adenopathy measures up to 2.8 cm, previously 2.4 cm. Subcarinal adenopathy now measures 3.0 cm in thickness, previously about 2.0 cm. Right hilar node now measures 1.7 cm, previously 1.4 cm. Anterior mediastinal node  now measures 1.8 cm, previously about 0.5 cm. Enlarged right epicardial lymph node now measures 2.0 cm, previously 1.7 cm. There is mild right axillary adenopathy. There is a pleural-based nodule in the right medial upper hemithorax abutting the thoracic  spine now measuring 1.0 cm, previously 0.5 cm.    Bronchial wall thickening in the right hemithorax suggests  bronchitis, although some degree of tumor spread could have this appearance. There is some compressive atelectasis in the lower lobes. There is pulmonary emphysema. Calcified granuloma is noted  in the left lower lobe. In the right lung, there are scattered areas of alveolar consolidation with some groundglass infiltrates. Much of this may be due to atelectasis, but pneumonia could have this appearance. Imaging features are atypical or  uncommonly reported for COVID-19 pneumonia. Alternative diagnoses should be considered.    Widespread blastic metastatic disease is again seen, grossly stable. This would be better assessed with a bone scan when needed. Upper abdomen shows atherosclerotic disease. There is retroperitoneal adenopathy compatible with metastatic disease.  Additionally, poorly defined low density lesions in the liver are compatible with metastatic disease.      Impression:         1. No pulmonary embolism or aortic dissection.  2. New left pleural effusion with a worsened loculated right pleural effusion.  3. Worsening metastatic disease to the chest including mediastinal and hilar adenopathy as well as pleural-based metastatic disease.  4. Consolidations in the lungs, much of which is due to compressive atelectasis. Groundglass alveolar opacities are noted in the right lung suggesting pneumonia.  Imaging features are atypical or uncommonly reported for COVID-19 pneumonia. Alternative  diagnoses should be considered.  5. Widespread osseous metastatic disease, similar to prior.  6. Metastatic disease in the upper abdomen, incompletely characterized.  7. Bronchitis in the right hemithorax versus potentially some metastatic disease along the bronchovascular bundles.    Signer Name: Leonardo Louis MD   Signed: 4/14/2020 1:16 AM   Workstation Name: [facility]    [facility]        Results for orders placed during the hospital encounter of 11/15/19   Adult Transthoracic Echo Complete  W/ Cont if Necessary Per Protocol (With Agitated Saline)    Narrative · Left ventricular systolic function is normal. Estimated EF = 60%.  · Left ventricular diastolic dysfunction (grade I) consistent with   impaired relaxation.  · The cardiac valves are anatomically and functionally normal.  · Positive bubble study suggesting intrapulmonary shunt.          Assessment/Plan   Assessment & Plan     Active Hospital Problems    Diagnosis POA   • Pleural effusion, bilateral [J90] Yes   • Acute and chronic respiratory failure with hypoxia (CMS/HCC) [J96.21] Yes   • Encephalopathy, metabolic [G93.41] Yes   • Anemia due to chemotherapy [D64.81, T45.1X5A] Yes   • Extensive stage small cell lung ca (RUL, bone & liver mets) - S/p Palliative Chemo x 3 cycles [C34.11] Yes   • Transaminitis [R74.0] Yes   • Uncontrolled type 2 diabetes mellitus (CMS/HCC) [E11.65] Yes       COVID-19 RISK SCREEN     1. Has the patient had close contact without PPE with a lab confirmed COVID-19 (+) person or a person under investigation (PUI) for COVID-19 infection?  -- No     2. Has the patient had respiratory symptoms, worsened/new cough and/or SOA, unexplained fever, or sudden loss of smell and/or taste in the past 7 days? --  Yes    3. Does the patient have baseline higher exposure risk such as working in healthcare field, currently residing in healthcare facility, or ongoing hemodialysis?  --  No     68 y/o male w/ hx of extensive stage small cell lung ca w/ mets to bone and liver here w/  1. Acute on chronic hypoxic respiratory failure with;  A. Worsening bilateral pleural effusions; may need therapeutic thoracentesis but will defer consult while performing covid r/o  B. ?pneumonia; cont iv abx; hold nebs for now.  NO more ivf's (was given 1 L in ER as hypotensive).  In house COVID PCR sent.    C. Worsening lung lesions; can consider consult w/ Mikal  2. Metabolic encephalopathy;  -discussed w/ wife on phone who states pt has had periods of  intermittent confusion.  He is on several pain meds.  Will check ammonia level as well.  ABG pending.  Does not have focal neuro deficits other than confusion so did not feel it was warranted at this time to obtain head CT while COVID pending.  Check UA as well.   3. Anemia;   -suspect this is related to chemotherapy.  Wife reports no known bleeding.  Pt is on eliquis for unclear reasons.    4. Transammanitis; likely related to liver lesions/?chemo.  5. Hyperglycemia w/ type 2 DM;   -SSI    *Discussed w/ wife at length his poor overall prognosis and code status.  Wife would like to have palliative care and hospice involved and would very much like to have pt at home if at all possible.  With regards to code status, wife states he would not want to live on a ventilator.  Discussed with wife that very low likelihood pt would be able to be extubated if ever intubated.  She is not yet ready to declare him dni/dnr but is reasonable and I feel would appreciate input from palliative/hospice as her main goal is for her/kids to be able to spend time with him.        DVT prophylaxis:  On eliquis    CODE STATUS:  See above  Code Status and Medical Interventions:   Ordered at: 04/14/20 0216     Code Status:    CPR     Medical Interventions (Level of Support Prior to Arrest):    Full       Admission Status:  I believe this patient meets INPATIENT status due to life threatening dz.  I feel patient’s risk for adverse outcomes and need for care warrant INPATIENT evaluation and I predict the patient’s care encounter to likely last beyond 2 midnights.      Electronically signed by Fabiola Orantes MD, 04/14/20, 2:52 AM.  75 minutes of critical care provided. This time excludes other billable procedures. Time does include preparation of documents, medical consultations, review of old records, and direct bedside care. Patient was at high risk for life-threatening deterioration due to metastatic lung ca, pna, pleural effusions and ?covid.

## 2020-04-14 NOTE — PLAN OF CARE
Problem: Patient Care Overview  Goal: Plan of Care Review  Outcome: Ongoing (interventions implemented as appropriate)  Flowsheets (Taken 4/14/2020 0557)  Progress: no change  Plan of Care Reviewed With: patient  Outcome Summary: New admit. VSS. NSR on the monitor. Refuses pain meds at this time. Will continue to monitor.

## 2020-04-14 NOTE — PROGRESS NOTES
Continued Stay Note  [facility]     Patient Name: Calin Portillo  MRN: [id number]  Today's Date: 4/14/2020    Admit Date: 4/13/2020    Discharge Plan     Row Name 04/14/20 1443       Plan    Plan  Home with [facility]    Plan Comments  Hospice referral received, chart reviewed. Discussed hospice referral with MARY ALICE SEARS with hospice, Lamar stated since pt is having confusion it is best to discuss hospice with pt's wife. Telephone call to pt's wife Kacey Erazo stated is aware of the hospice referral, and is agreeable with having the discussion about hospice with this writer. Teaching done on hospice services, goals of care. Kacey stated pt is unable to continue with chemotherapy at this time, the goal of care is to focus on quality of life; stated does want hospice services at home. Informed Kacey the home hospice nurse can do teaching on hospice with pt when pt is home and family is present to provide support to pt, Kacey agreeable to this plan. Kacey stated has begun talking with pt about hospice. Discussed equipment needs-ordered a hospital bed, overbed table, oxygen, BSC and rollator to be delivered tomorrow by hospice. Kacey stated will drive pt home when pt is discharged. Telephone call to Dr. Tom informed that wife does want hospice services and will provide transportation home. Dr. Tom stated will speak with wife regarding the possibility of inserting a pleurix drain. If drained inserted pt will discharge home Thursday, if goes without a drain pt may discharge home tomorrow. Will continue to follow.     Row Name 04/14/20 1213       Plan    Plan     Patient/Family in Agreement with Plan     Plan Comments     Final Discharge Disposition Code         Discharge Codes    No documentation.             Lian Stokes, RN

## 2020-04-14 NOTE — PROGRESS NOTES
Discharge Planning Assessment  [facility]     Patient Name: Calin Portillo  MRN: [id number]  Today's Date: 4/14/2020    Admit Date: 4/13/2020    Discharge Needs Assessment     Row Name 04/14/20 1211       Living Environment    Lives With  spouse    Name(s) of Who Lives With Patient  Kacey Portillo    Current Living Arrangements  home/apartment/condo    Primary Care Provided by  self;spouse/significant other    Provides Primary Care For  no one    Family Caregiver if Needed  spouse    Family Caregiver Names  Kacey Portillo    Quality of Family Relationships  unable to assess    Able to Return to Prior Arrangements  no       Resource/Environmental Concerns    Resource/Environmental Concerns  none       Transition Planning    Patient/Family Anticipates Transition to  home with family    Patient/Family Anticipated Services at Transition  hospice care    Transportation Anticipated  family or friend will provide       Discharge Needs Assessment    Readmission Within the Last 30 Days  no previous admission in last 30 days    Concerns to be Addressed  no discharge needs identified    Equipment Currently Used at Home  oxygen    Anticipated Changes Related to Illness  none    Equipment Needed After Discharge  none    Discharge Coordination/Progress  Home likely with hospice care        Discharge Plan     Row Name 04/14/20 1213       Plan    Plan  Home likely with hospice care    Patient/Family in Agreement with Plan  yes    Plan Comments  Patient familiar to this writer.  Hospice consult has been placed and information gathered for discharge planning was from previous admissions.  Patient has used [facility] in the past and has oxygen through [employer].  Patient lives in a multilevel house with his wife but does not access the upstairs.  CM will continue to follow and will review Hospice notes and is available to assist with discharge planning.     Final Discharge Disposition Code  50 - home with hospice;01 - home or self-care         Destination      Coordination has not been started for this encounter.      Durable Medical Equipment      Coordination has not been started for this encounter.      Dialysis/Infusion      Coordination has not been started for this encounter.      Home Medical Care      Coordination has not been started for this encounter.      Therapy      Coordination has not been started for this encounter.      Community Resources      Coordination has not been started for this encounter.          Demographic Summary     Row Name 04/14/20 1206       General Information    Admission Type  inpatient    Arrived From  home    Referral Source  admission list    Reason for Consult  discharge planning    Preferred Language  English     Used During This Interaction  no    General Information Comments  Rakesh Alcantara MD       Contact Information    Permission Granted to Share Info With      Contact Information Obtained for      Contact Information Comments  Kacey Portillo, spouse  [phone]        Functional Status     Row Name 04/14/20 1207       Functional Status    Usual Activity Tolerance  moderate    Current Activity Tolerance  fair       Functional Status, IADL    Medications  assistive person    Meal Preparation  assistive person    Housekeeping  assistive person    Laundry  assistive person    Shopping  assistive person       Employment/    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       Abuse/Neglect    No documentation.       Legal    No documentation.       Substance Abuse    No documentation.       Patient Forms    No documentation.           Sera Resendez, RN

## 2020-04-15 PROBLEM — J18.9 CAP (COMMUNITY ACQUIRED PNEUMONIA): Status: ACTIVE | Noted: 2020-01-01

## 2020-04-15 NOTE — PLAN OF CARE
Problem: Patient Care Overview  Goal: Plan of Care Review  Outcome: Ongoing (interventions implemented as appropriate)  Flowsheets  Taken 4/14/2020 0557 by Megan Torres RN  Progress: no change  Taken 4/14/2020 2000 by David Guzman RN  Plan of Care Reviewed With: patient (Pended)  Taken 4/15/2020 0332 by David Guzman RN  Outcome Summary: VSS. Resting well on shift. ABX infusing. No prns requested. Drain to be placed today. Continue to monitor.

## 2020-04-15 NOTE — CONSULTS
"Diabetes Education    Patient Name:  Calin Portillo  YOB: 1950  MRN: [id number]  Admit Date:  4/13/2020        Diabetes education consult noted. Chart review, noted pt current condition and transfer to hospice care. Spoke w/ Mr. Portillo over the phone, he confirms that he does have a glucometer in the home and knows how to use it. He shared that his blood sugars have been \"all over the place\", appetite as well. Discussed how stress and illness affect blood sugars. Reviewed s/sx of high and low blood sugar and monitoring blood sugar as needed at home if he is experiencing any symptoms. He does not have any questions at this time. Attempted to call his wife, she did not answer.       Electronically signed by:  Radha Simms RN  04/15/20 09:06  "

## 2020-04-15 NOTE — DISCHARGE SUMMARY
[facility] Medicine Services  DISCHARGE SUMMARY    Patient Name: Calin Portillo  : 1950  MRN: [id number]    Date of Admission: 2020 10:51 PM  Date of Discharge: April 15, 2020  Primary Care Physician: Rakesh Alcantara MD    Consults     No orders found from 3/15/2020 to 2020.          Hospital Course     Presenting Problem:   Dyspnea, unspecified type [R06.00]    Active Hospital Problems    Diagnosis  POA   • **Extensive stage small cell lung ca (RUL, bone & liver mets) - S/p Palliative Chemo x 3 cycles [C34.11]  Yes   • CAP (community acquired pneumonia) [J18.9]  Unknown   • Pleural effusion, bilateral [J90]  Yes   • Acute and chronic respiratory failure with hypoxia (CMS/HCC) [J96.21]  Yes   • Encephalopathy, metabolic [G93.41]  Yes   • Anemia due to chemotherapy [D64.81, T45.1X5A]  Yes   • Dyspnea [R06.00]  Yes   • Transaminitis [R74.0]  Yes   • Uncontrolled type 2 diabetes mellitus (CMS/HCC) [E11.65]  Yes      Resolved Hospital Problems   No resolved problems to display.          Hospital Course:  Calin Portillo is a 70 y.o. male With a past medical history of type 2 diabetes, extensive stage small cell lung cancer involving lung, liver, and bone status post palliative chemotherapy x3 cycles, chronic pain syndrome, hyperlipidemia, and chronic anemia who was admitted on  for acute on chronic respiratory failure with hypoxia, initially on 6A for COVID rule out , COVID not detected and he was transferred to . Per my partner Dr. Tom's note, she spoke with patient and his wife regarding goals of care and they were favoring hospice approach and would like to return home with hospice as soon as possible.  Hospice was consulted and he was appropriate for home hospice.  They said of his equipment and he was stable for discharge on April 15.  On my exam he was comfortable on room air, but anticipate that with his pleural effusions he would likely need  home oxygen in the future.  There was plans to possibly place a Pleurx catheter however radiologist did not believe there was enough fluid for this to be done, so this procedure was canceled on day of discharge.  He was sent home to complete antibiotic course for community-acquired pneumonia.    Discharge Follow Up Recommendations for outpatient labs/diagnostics:  Keep follow-up appointment on April 30 with LILLIAN Laureano, hematology oncology    Day of Discharge     HPI:   No acute events overnight.  Patient is up in bed, appears comfortable off of nasal cannula.  Denies significant shortness of breath, fever, or chills, or cough.    Review of Systems  CV- No chest pain, palpitations  GI- No N/V/D, abd pain    Vital Signs:   Temp:  [97.8 °F (36.6 °C)-98.7 °F (37.1 °C)] 98 °F (36.7 °C)  Heart Rate:  [] 104  Resp:  [16-18] 18  BP: (103-113)/(55-62) 110/62     Physical Exam:  Constitutional: No acute distress, awake, alert, cachectic  HENT: NCAT, mucous membranes moist  Respiratory: Clear to auscultation bilaterally, respiratory effort normal on room air  Cardiovascular: RRR, no murmurs  Gastrointestinal: Positive bowel sounds, soft, nontender, nondistended  Psychiatric: Appropriate affect, cooperative  Neurologic: Oriented to person and place, cranial Nerves grossly intact to confrontation, speech clear  Skin: No rashes on exposed skin    Pertinent  and/or Most Recent Results     Results from last 7 days   Lab Units 04/14/20 0515 04/13/20  2342 04/10/20  1306   WBC 10*3/mm3 5.37 6.58 9.90   HEMOGLOBIN g/dL 8.6* 7.9* 9.3*   HEMATOCRIT % 28.2* 26.0* 28.1*   PLATELETS 10*3/mm3 203 194 262   SODIUM mmol/L 137 138  --    POTASSIUM mmol/L 4.0 4.3  --    CHLORIDE mmol/L 100 98  --    CO2 mmol/L 27.0 26.0  --    BUN mg/dL 18 19  --    CREATININE mg/dL 0.66* 0.74*  --    GLUCOSE mg/dL 164* 248*  --    CALCIUM mg/dL 8.6 8.9  --      Results from last 7 days   Lab Units 04/14/20  0515 04/13/20  2342   BILIRUBIN mg/dL   --  0.6   ALK PHOS U/L  --  409*   ALT (SGPT) U/L  --  48*   AST (SGOT) U/L  --  75*   PROTIME Seconds 16.2*  --    INR  1.33*  --            Invalid input(s): TG, LDLCALC, LDLREALC  Results from last 7 days   Lab Units 04/15/20  0503 04/14/20  0515 04/13/20  2342   PROBNP pg/mL  --   --  847.6   TROPONIN T ng/mL  --   --  <0.010   PROCALCITONIN ng/mL 0.68* 0.69*  --    LACTATE mmol/L  --   --  1.8       Brief Urine Lab Results  (Last result in the past 365 days)      Color   Clarity   Blood   Leuk Est   Nitrite   Protein   CREAT   Urine HCG        04/14/20 0443 Yellow Clear Negative Negative Negative Negative               Microbiology Results Abnormal     Procedure Component Value - Date/Time    Blood Culture - Blood, Arm, Right [[id number]] Collected:  04/13/20 2338    Lab Status:  Preliminary result Specimen:  Blood from Arm, Right Updated:  04/15/20 0000     Blood Culture No growth at 24 hours    Blood Culture - Blood, Arm, Left [[id number]] Collected:  04/13/20 2330    Lab Status:  Preliminary result Specimen:  Blood from Arm, Left Updated:  04/15/20 0000     Blood Culture No growth at 24 hours    SARS-CoV-2, PCR (IN-HOUSE), NP Swab in Transport Media - Swab, Nasopharynx [[id number]]  (Normal) Collected:  04/14/20 0141    Lab Status:  Final result Specimen:  Swab from Nasopharynx Updated:  04/14/20 0944     COVID19 Not Detected    Respiratory Panel, PCR - Swab, Nasopharynx [[id number]]  (Normal) Collected:  04/14/20 0141    Lab Status:  Final result Specimen:  Swab from Nasopharynx Updated:  04/14/20 0327     ADENOVIRUS, PCR Not Detected     Coronavirus 229E Not Detected     Coronavirus HKU1 Not Detected     Coronavirus NL63 Not Detected     Coronavirus OC43 Not Detected     Human Metapneumovirus Not Detected     Human Rhinovirus/Enterovirus Not Detected     Influenza B PCR Not Detected     Parainfluenza Virus 1 Not Detected     Parainfluenza Virus 2 Not Detected     Parainfluenza Virus 3 Not Detected      Parainfluenza Virus 4 Not Detected     Bordetella pertussis pcr Not Detected     Influenza A H1 2009 PCR Not Detected     Chlamydophila pneumoniae PCR Not Detected     Mycoplasma pneumo by PCR Not Detected     Influenza A PCR Not Detected     Influenza A H3 Not Detected     Influenza A H1 Not Detected     RSV, PCR Not Detected     Bordetella parapertussis PCR Not Detected    Narrative:       The coronavirus on the RVP is NOT COVID-19 and is NOT indicative of infection with COVID-19.           Imaging Results (All)     Procedure Component Value Units Date/Time    CT Angiogram Chest [[id number]] Collected:  04/14/20 0116     Updated:  04/14/20 0118    Narrative:       CTA Chest    INDICATION:   Progressive dyspnea over the last 3 days. History of metastatic lung cancer status post chemotherapy.    TECHNIQUE:   CT angiogram of the chest with IV contrast. 3-D reconstructions were obtained and reviewed.   Radiation dose reduction techniques included automated exposure control or exposure modulation based on body size. Count of known CT and cardiac nuc med studies  performed in previous 12 months: 13.     COMPARISON:   2/24/2020    FINDINGS:   There is no pulmonary embolism or aortic dissection. There is a new small left pleural effusion. There is a large loculated right pleural effusion, worsened from prior. Left-sided Port-A-Cath remains in place. There is new precarinal adenopathy measuring  1.9 cm short Axis. Right paratracheal adenopathy measures up to 2.8 cm, previously 2.4 cm. Subcarinal adenopathy now measures 3.0 cm in thickness, previously about 2.0 cm. Right hilar node now measures 1.7 cm, previously 1.4 cm. Anterior mediastinal node  now measures 1.8 cm, previously about 0.5 cm. Enlarged right epicardial lymph node now measures 2.0 cm, previously 1.7 cm. There is mild right axillary adenopathy. There is a pleural-based nodule in the right medial upper hemithorax abutting the thoracic  spine now measuring 1.0  cm, previously 0.5 cm.    Bronchial wall thickening in the right hemithorax suggests bronchitis, although some degree of tumor spread could have this appearance. There is some compressive atelectasis in the lower lobes. There is pulmonary emphysema. Calcified granuloma is noted  in the left lower lobe. In the right lung, there are scattered areas of alveolar consolidation with some groundglass infiltrates. Much of this may be due to atelectasis, but pneumonia could have this appearance. Imaging features are atypical or  uncommonly reported for COVID-19 pneumonia. Alternative diagnoses should be considered.    Widespread blastic metastatic disease is again seen, grossly stable. This would be better assessed with a bone scan when needed. Upper abdomen shows atherosclerotic disease. There is retroperitoneal adenopathy compatible with metastatic disease.  Additionally, poorly defined low density lesions in the liver are compatible with metastatic disease.      Impression:         1. No pulmonary embolism or aortic dissection.  2. New left pleural effusion with a worsened loculated right pleural effusion.  3. Worsening metastatic disease to the chest including mediastinal and hilar adenopathy as well as pleural-based metastatic disease.  4. Consolidations in the lungs, much of which is due to compressive atelectasis. Groundglass alveolar opacities are noted in the right lung suggesting pneumonia.  Imaging features are atypical or uncommonly reported for COVID-19 pneumonia. Alternative  diagnoses should be considered.  5. Widespread osseous metastatic disease, similar to prior.  6. Metastatic disease in the upper abdomen, incompletely characterized.  7. Bronchitis in the right hemithorax versus potentially some metastatic disease along the bronchovascular bundles.    Signer Name: Leonardo Louis MD   Signed: 4/14/2020 1:16 AM   Workstation Name: [facility]    [facility]          Results for orders  placed during the hospital encounter of 11/15/19   Duplex Venous Lower Extremity - Bilateral CAR    Narrative · Normal bilateral lower extremity venous duplex scan.          Results for orders placed during the hospital encounter of 11/15/19   Duplex Venous Lower Extremity - Bilateral CAR    Narrative · Normal bilateral lower extremity venous duplex scan.          Results for orders placed during the hospital encounter of 11/15/19   Adult Transthoracic Echo Complete W/ Cont if Necessary Per Protocol (With Agitated Saline)    Narrative · Left ventricular systolic function is normal. Estimated EF = 60%.  · Left ventricular diastolic dysfunction (grade I) consistent with   impaired relaxation.  · The cardiac valves are anatomically and functionally normal.  · Positive bubble study suggesting intrapulmonary shunt.          Plan for Follow-up of Pending Labs/Results:    Order Current Status    Blood Culture - Blood, Arm, Left Preliminary result    Blood Culture - Blood, Arm, Right Preliminary result        Discharge Details        Discharge Medications      New Medications      Instructions Start Date   cefdinir 300 MG capsule  Commonly known as:  OMNICEF   300 mg, Oral, 2 Times Daily      doxycycline 100 MG capsule  Commonly known as:  MONODOX   100 mg, Oral, Every 12 Hours Scheduled      hydrOXYzine 25 MG tablet  Commonly known as:  ATARAX   25 mg, Oral, 3 Times Daily PRN         Changes to Medications      Instructions Start Date   LORazepam 1 MG tablet  Commonly known as:  Ativan  What changed:  how much to take   0.5 mg, Oral, Every 8 Hours PRN         Continue These Medications      Instructions Start Date   aspirin 81 MG EC tablet   81 mg, Oral, Daily      atorvastatin 80 MG tablet  Commonly known as:  LIPITOR   80 mg, Oral, Nightly      docusate sodium 100 MG capsule  Commonly known as:  COLACE   200 mg, Oral, 2 Times Daily PRN      dronabinol 2.5 MG capsule  Commonly known as:  Marinol   2.5 mg, Oral, 2 Times  Daily Before Meals      fluticasone 50 MCG/ACT nasal spray  Commonly known as:  Flonase   1 spray, Nasal, 2 Times Daily      Fluticasone-Umeclidin-Vilant 100-62.5-25 MCG/INH aerosol powder   Commonly known as:  Trelegy Ellipta   1 each, Inhalation, Every Morning      gabapentin 300 MG capsule  Commonly known as:  NEURONTIN   300 mg, Oral, 3 Times Daily      Glyxambi 25-5 MG tablet  Generic drug:  Empagliflozin-linaGLIPtin   0.5 tablets, Oral, Every Morning      lidocaine-prilocaine 2.5-2.5 % cream  Commonly known as:  EMLA   Topical, As Needed      metFORMIN  MG 24 hr tablet  Commonly known as:  GLUCOPHAGE-XR   1,000 mg, Oral, 2 Times Daily      mirtazapine 15 MG tablet  Commonly known as:  REMERON   15 mg, Oral, Nightly, For appetite      Morphine 15 MG 12 hr tablet  Commonly known as:  MS CONTIN   45 mg, Oral, 2 Times Daily      nateglinide 120 MG tablet  Commonly known as:  Starlix   120 mg, Oral, 3 Times Daily Before Meals      ondansetron 8 MG tablet  Commonly known as:  ZOFRAN   8 mg, Oral, Every 8 Hours PRN      oxyCODONE 10 MG tablet  Commonly known as:  ROXICODONE   10 mg, Oral, 4 Times Daily Before Meals & Nightly      zolpidem 10 MG tablet  Commonly known as:  AMBIEN   10 mg, Oral, Nightly PRN         Stop These Medications    apixaban 5 MG tablet tablet  Commonly known as:  Eliquis            No Known Allergies      Discharge Disposition:  Hospice/Medical Facility (DC - External)    Diet:  Hospital:  Diet Order   Procedures   • Diet Regular          CODE STATUS:    Code Status and Medical Interventions:   Ordered at: 04/14/20 0602     Limited Support to NOT Include:    Cardioversion/Defibrillation    Intubation     Code Status:    No CPR     Medical Interventions (Level of Support Prior to Arrest):    Limited     Comments:    d/w wife and patient would not want life supportive measrues       Future Appointments   Date Time Provider Department Center   4/17/2020  1:00 PM CHAIR 19 [facility]      4/30/2020  1:00 PM Britta, LILLIAN Maier [facility]   4/30/2020  1:30 PM CHAIR 15  [facility]           Time Spent on Discharge:  31 minutes    Electronically signed by Carmina Hsu MD, 04/15/20, 2:27 PM.

## 2021-02-19 NOTE — PROGRESS NOTES
Continued Stay Note  [facility]     Patient Name: Calin Portillo  MRN: [id number]  Today's Date: 4/15/2020    Admit Date: 4/13/2020    Discharge Plan     Row Name 04/15/20 1347       Plan    Plan  Home with [facility]    Final Discharge Disposition Code  50 - home with hospice    Final Note  Spoke with staff nurse Nelda regarding pt's condition this morning and placement of pleurix drain, Nelda stated pt is not receiving a drain. Spoke with Dr. Hsu regarding discharge, Dr. Hsu stated pt may discharge home today, will order morphine and oxycodone from [facility] for meds to bed. Dr. Hsu stated pt is currently not using the oxygen and will beable to travel home without oxygen. Telephone call to pt's wife, informed pt may discharge today, wife stated will pick pt up today and transport pt home via private vehicle. Provided wife with hospice 24 hr number to call when pt arrives home, wife verbalized understanding. Please call [phone] if can be of further assistance.         Discharge Codes    No documentation.       Expected Discharge Date and Time     Expected Discharge Date Expected Discharge Time    Apr 15, 2020             Lian Stokes RN     POA, likely multifactorial 2/2 ATN in setting of COVID-19 + contrast induced nephropathy + vanco toxicity   Baseline creatinine 1 0-1 3    Plan    · Turner catheter in place  · Monitor I/Os  · Currently on Bumex 2 mg twice daily  · Monitor renal indices

## (undated) DEVICE — SPNG GZ WOVN 4X4IN 12PLY 10/BX STRL

## (undated) DEVICE — ST ACC MICROPUNCTURE .018 TRANSLSS/SS/TP 5F/10CM 21G/7CM

## (undated) DEVICE — ANGIO-SEAL VIP VASCULAR CLOSURE DEVICE: Brand: ANGIO-SEAL

## (undated) DEVICE — LUER-LOK 360°: Brand: CONNECTA, LUER-LOK

## (undated) DEVICE — CVR HNDL LT SURG ACCSSRY BLU STRL

## (undated) DEVICE — Device

## (undated) DEVICE — SYR LL TP 10ML STRL

## (undated) DEVICE — NDL HYPO SFTY PROEDGE 27G 1 1/4IN GRY

## (undated) DEVICE — TRAP,MUCUS SPECIMEN,40CC: Brand: MEDLINE

## (undated) DEVICE — KT CATH DRN PLEURL PLEURX/SAFE/T SIL 15.5F 66CM 4BT/1000ML

## (undated) DEVICE — GUIDEWIRE WITH ICE™ HYDROPHILIC COATING: Brand: TRANSEND™ EX

## (undated) DEVICE — LEX NEURO ANGIOGRAPHY: Brand: MEDLINE INDUSTRIES, INC.

## (undated) DEVICE — ROTATING HEMOSTATIC VALVE .096": Brand: RHV

## (undated) DEVICE — PAD,NON-ADHERENT,2X3,STERILE,LF,1/PK: Brand: MEDLINE

## (undated) DEVICE — CATH GUIDE ENVOY MPD 5F0.056IN 90CM

## (undated) DEVICE — INTRO SHEATH ART/FEM ENGAGE .038 5F12CM

## (undated) DEVICE — LIMB HOLDER, WRIST/ANKLE: Brand: DEROYAL

## (undated) DEVICE — SUCTION CANISTER, 1000CC,SAFELINER: Brand: DEROYAL

## (undated) DEVICE — DECANT BG O JET

## (undated) DEVICE — PK MINOR SPLT 10

## (undated) DEVICE — ADAPT SWVL FIBROPTIC BRONCH

## (undated) DEVICE — STPCK LP 1WY RA 200PSI

## (undated) DEVICE — INTRO SHEATH ENGAGE W/50 GW .038 8F12

## (undated) DEVICE — SINGLE USE SUCTION VALVE MAJ-209: Brand: SINGLE USE SUCTION VALVE (STERILE)

## (undated) DEVICE — ANTIBACTERIAL UNDYED BRAIDED (POLYGLACTIN 910), SYNTHETIC ABSORBABLE SUTURE: Brand: COATED VICRYL

## (undated) DEVICE — 3M™ IOBAN™ 2 ANTIMICROBIAL INCISE DRAPE 6650EZ: Brand: IOBAN™ 2

## (undated) DEVICE — CATH MIC ORION21 2.4X2.6F .021IN 150CM

## (undated) DEVICE — TREVO XP PROVUE RETRIEVER: Brand: TREVO XP

## (undated) DEVICE — SKIN AFFIX SURG ADHESIVE 72/CS 0.55ML: Brand: MEDLINE

## (undated) DEVICE — APPL CHLORAPREP W/TINT 26ML BLU

## (undated) DEVICE — Device: Brand: SINGLE USE ASPIRATION NEEDLE NA-U401SX

## (undated) DEVICE — SYR LUERLOK 20CC BX/50

## (undated) DEVICE — BOWL UTIL STRL 32OZ

## (undated) DEVICE — GLV SURG PREMIERPRO MIC LTX PF SZ7 BRN

## (undated) DEVICE — SUT MNCRYL 4/0 SH 27IN Y415H

## (undated) DEVICE — DRSNG SURESITE WNDW 2.38X2.75

## (undated) DEVICE — SUT SILK 2/0 SH 30IN K833H

## (undated) DEVICE — RADIFOCUS GLIDEWIRE: Brand: GLIDEWIRE

## (undated) DEVICE — BALLOON GUIDE CATHETER: Brand: FLOWGATE2

## (undated) DEVICE — Device: Brand: BALLOON

## (undated) DEVICE — STPCK 3/WY HP M/RA W/OFF/HNDL 1050PSI STRL

## (undated) DEVICE — SNAP KOVER: Brand: UNBRANDED

## (undated) DEVICE — SINGLE USE BIOPSY VALVE MAJ-210: Brand: SINGLE USE BIOPSY VALVE (STERILE)

## (undated) DEVICE — ST EXT MICROBORE FIX M LL 38IN

## (undated) DEVICE — CATH TEMPO 5F BER 100CM: Brand: TEMPO